# Patient Record
Sex: MALE | Race: WHITE
[De-identification: names, ages, dates, MRNs, and addresses within clinical notes are randomized per-mention and may not be internally consistent; named-entity substitution may affect disease eponyms.]

---

## 2017-05-23 ENCOUNTER — APPOINTMENT (OUTPATIENT)
Dept: ELECTROPHYSIOLOGY | Facility: CLINIC | Age: 72
End: 2017-05-23

## 2017-05-23 DIAGNOSIS — Z86.79 PERSONAL HISTORY OF OTHER DISEASES OF THE CIRCULATORY SYSTEM: ICD-10-CM

## 2017-08-22 ENCOUNTER — APPOINTMENT (OUTPATIENT)
Dept: ELECTROPHYSIOLOGY | Facility: CLINIC | Age: 72
End: 2017-08-22
Payer: MEDICARE

## 2017-08-22 PROCEDURE — 93296 REM INTERROG EVL PM/IDS: CPT

## 2017-08-22 PROCEDURE — 93294 REM INTERROG EVL PM/LDLS PM: CPT

## 2017-11-21 ENCOUNTER — APPOINTMENT (OUTPATIENT)
Dept: ELECTROPHYSIOLOGY | Facility: CLINIC | Age: 72
End: 2017-11-21
Payer: MEDICARE

## 2017-11-21 PROCEDURE — 93296 REM INTERROG EVL PM/IDS: CPT

## 2017-11-21 PROCEDURE — 93294 REM INTERROG EVL PM/LDLS PM: CPT

## 2018-02-15 ENCOUNTER — APPOINTMENT (OUTPATIENT)
Dept: ELECTROPHYSIOLOGY | Facility: CLINIC | Age: 73
End: 2018-02-15
Payer: MEDICARE

## 2018-02-15 VITALS
DIASTOLIC BLOOD PRESSURE: 79 MMHG | SYSTOLIC BLOOD PRESSURE: 136 MMHG | HEIGHT: 68 IN | BODY MASS INDEX: 36.37 KG/M2 | WEIGHT: 240 LBS | HEART RATE: 80 BPM

## 2018-02-15 PROCEDURE — 93280 PM DEVICE PROGR EVAL DUAL: CPT

## 2018-05-14 ENCOUNTER — APPOINTMENT (OUTPATIENT)
Dept: ELECTROPHYSIOLOGY | Facility: CLINIC | Age: 73
End: 2018-05-14
Payer: MEDICARE

## 2018-05-14 PROCEDURE — 93296 REM INTERROG EVL PM/IDS: CPT

## 2018-05-14 PROCEDURE — 93294 REM INTERROG EVL PM/LDLS PM: CPT

## 2018-08-29 ENCOUNTER — APPOINTMENT (OUTPATIENT)
Dept: ELECTROPHYSIOLOGY | Facility: CLINIC | Age: 73
End: 2018-08-29
Payer: MEDICARE

## 2018-08-29 PROCEDURE — 93296 REM INTERROG EVL PM/IDS: CPT

## 2018-08-29 PROCEDURE — 93294 REM INTERROG EVL PM/LDLS PM: CPT

## 2018-11-26 ENCOUNTER — APPOINTMENT (OUTPATIENT)
Dept: ELECTROPHYSIOLOGY | Facility: CLINIC | Age: 73
End: 2018-11-26
Payer: MEDICARE

## 2018-11-26 PROCEDURE — 93294 REM INTERROG EVL PM/LDLS PM: CPT

## 2018-11-26 PROCEDURE — 93296 REM INTERROG EVL PM/IDS: CPT

## 2019-02-21 ENCOUNTER — APPOINTMENT (OUTPATIENT)
Dept: ELECTROPHYSIOLOGY | Facility: CLINIC | Age: 74
End: 2019-02-21
Payer: MEDICARE

## 2019-02-21 VITALS
DIASTOLIC BLOOD PRESSURE: 70 MMHG | HEIGHT: 68 IN | BODY MASS INDEX: 39.4 KG/M2 | HEART RATE: 77 BPM | SYSTOLIC BLOOD PRESSURE: 121 MMHG | WEIGHT: 260 LBS

## 2019-02-21 PROCEDURE — 93280 PM DEVICE PROGR EVAL DUAL: CPT

## 2019-05-13 ENCOUNTER — APPOINTMENT (OUTPATIENT)
Dept: ELECTROPHYSIOLOGY | Facility: CLINIC | Age: 74
End: 2019-05-13
Payer: MEDICARE

## 2019-05-13 PROCEDURE — 93296 REM INTERROG EVL PM/IDS: CPT

## 2019-05-13 PROCEDURE — 93294 REM INTERROG EVL PM/LDLS PM: CPT

## 2019-08-19 ENCOUNTER — APPOINTMENT (OUTPATIENT)
Dept: ELECTROPHYSIOLOGY | Facility: CLINIC | Age: 74
End: 2019-08-19
Payer: MEDICARE

## 2019-08-19 PROCEDURE — 93296 REM INTERROG EVL PM/IDS: CPT

## 2019-08-19 PROCEDURE — 93294 REM INTERROG EVL PM/LDLS PM: CPT

## 2019-11-25 ENCOUNTER — APPOINTMENT (OUTPATIENT)
Dept: ELECTROPHYSIOLOGY | Facility: CLINIC | Age: 74
End: 2019-11-25
Payer: MEDICARE

## 2019-11-25 PROCEDURE — 93294 REM INTERROG EVL PM/LDLS PM: CPT

## 2019-11-25 PROCEDURE — 93296 REM INTERROG EVL PM/IDS: CPT

## 2020-02-20 ENCOUNTER — APPOINTMENT (OUTPATIENT)
Dept: ELECTROPHYSIOLOGY | Facility: CLINIC | Age: 75
End: 2020-02-20
Payer: MEDICARE

## 2020-02-20 VITALS
BODY MASS INDEX: 40.16 KG/M2 | SYSTOLIC BLOOD PRESSURE: 133 MMHG | WEIGHT: 265 LBS | RESPIRATION RATE: 20 BRPM | OXYGEN SATURATION: 99 % | DIASTOLIC BLOOD PRESSURE: 72 MMHG | HEIGHT: 68 IN | HEART RATE: 64 BPM

## 2020-02-20 PROCEDURE — 93280 PM DEVICE PROGR EVAL DUAL: CPT

## 2020-02-26 ENCOUNTER — APPOINTMENT (OUTPATIENT)
Dept: ELECTROPHYSIOLOGY | Facility: CLINIC | Age: 75
End: 2020-02-26

## 2020-05-20 ENCOUNTER — APPOINTMENT (OUTPATIENT)
Dept: ELECTROPHYSIOLOGY | Facility: CLINIC | Age: 75
End: 2020-05-20
Payer: MEDICARE

## 2020-05-20 PROCEDURE — 93294 REM INTERROG EVL PM/LDLS PM: CPT

## 2020-05-20 PROCEDURE — 93296 REM INTERROG EVL PM/IDS: CPT

## 2020-05-26 ENCOUNTER — TRANSCRIPTION ENCOUNTER (OUTPATIENT)
Age: 75
End: 2020-05-26

## 2020-08-20 ENCOUNTER — APPOINTMENT (OUTPATIENT)
Dept: ELECTROPHYSIOLOGY | Facility: CLINIC | Age: 75
End: 2020-08-20
Payer: MEDICARE

## 2020-08-20 PROCEDURE — 93296 REM INTERROG EVL PM/IDS: CPT

## 2020-08-20 PROCEDURE — 93294 REM INTERROG EVL PM/LDLS PM: CPT

## 2020-11-16 RX ORDER — CIPROFLOXACIN LACTATE 400MG/40ML
1 VIAL (ML) INTRAVENOUS
Qty: 0 | Refills: 0 | DISCHARGE
Start: 2020-11-16 | End: 2020-11-25

## 2020-11-20 ENCOUNTER — TRANSCRIPTION ENCOUNTER (OUTPATIENT)
Age: 75
End: 2020-11-20

## 2020-11-20 ENCOUNTER — APPOINTMENT (OUTPATIENT)
Dept: ELECTROPHYSIOLOGY | Facility: CLINIC | Age: 75
End: 2020-11-20
Payer: MEDICARE

## 2020-11-20 ENCOUNTER — APPOINTMENT (OUTPATIENT)
Dept: CT IMAGING | Facility: CLINIC | Age: 75
End: 2020-11-20

## 2020-11-20 PROCEDURE — 93294 REM INTERROG EVL PM/LDLS PM: CPT

## 2020-11-20 PROCEDURE — 93296 REM INTERROG EVL PM/IDS: CPT

## 2020-12-01 ENCOUNTER — INPATIENT (INPATIENT)
Facility: HOSPITAL | Age: 75
LOS: 1 days | Discharge: HOME | DRG: 640 | End: 2020-12-03
Attending: HOSPITALIST | Admitting: HOSPITALIST
Payer: MEDICARE

## 2020-12-01 VITALS — HEIGHT: 78 IN | WEIGHT: 250 LBS

## 2020-12-01 DIAGNOSIS — R42 DIZZINESS AND GIDDINESS: ICD-10-CM

## 2020-12-01 DIAGNOSIS — Z95.1 PRESENCE OF AORTOCORONARY BYPASS GRAFT: Chronic | ICD-10-CM

## 2020-12-01 LAB
ALBUMIN SERPL ELPH-MCNC: 3.6 G/DL — SIGNIFICANT CHANGE UP (ref 3.3–5)
ALP SERPL-CCNC: 59 U/L — SIGNIFICANT CHANGE UP (ref 40–120)
ALT FLD-CCNC: 21 U/L — SIGNIFICANT CHANGE UP (ref 12–78)
ANION GAP SERPL CALC-SCNC: 3 MMOL/L — LOW (ref 5–17)
APTT BLD: 46 SEC — HIGH (ref 27.5–35.5)
AST SERPL-CCNC: 21 U/L — SIGNIFICANT CHANGE UP (ref 15–37)
BASOPHILS # BLD AUTO: 0.08 K/UL — SIGNIFICANT CHANGE UP (ref 0–0.2)
BASOPHILS NFR BLD AUTO: 0.9 % — SIGNIFICANT CHANGE UP (ref 0–2)
BILIRUB SERPL-MCNC: 0.8 MG/DL — SIGNIFICANT CHANGE UP (ref 0.2–1.2)
BUN SERPL-MCNC: 20 MG/DL — SIGNIFICANT CHANGE UP (ref 7–23)
CALCIUM SERPL-MCNC: 9.4 MG/DL — SIGNIFICANT CHANGE UP (ref 8.5–10.1)
CHLORIDE SERPL-SCNC: 104 MMOL/L — SIGNIFICANT CHANGE UP (ref 96–108)
CO2 SERPL-SCNC: 32 MMOL/L — HIGH (ref 22–31)
CREAT SERPL-MCNC: 1.04 MG/DL — SIGNIFICANT CHANGE UP (ref 0.5–1.3)
EOSINOPHIL # BLD AUTO: 0.19 K/UL — SIGNIFICANT CHANGE UP (ref 0–0.5)
EOSINOPHIL NFR BLD AUTO: 2.1 % — SIGNIFICANT CHANGE UP (ref 0–6)
GLUCOSE SERPL-MCNC: 86 MG/DL — SIGNIFICANT CHANGE UP (ref 70–99)
HCT VFR BLD CALC: 43.5 % — SIGNIFICANT CHANGE UP (ref 39–50)
HGB BLD-MCNC: 13.7 G/DL — SIGNIFICANT CHANGE UP (ref 13–17)
IMM GRANULOCYTES NFR BLD AUTO: 0.3 % — SIGNIFICANT CHANGE UP (ref 0–1.5)
INR BLD: 2.54 RATIO — HIGH (ref 0.88–1.16)
LYMPHOCYTES # BLD AUTO: 1.5 K/UL — SIGNIFICANT CHANGE UP (ref 1–3.3)
LYMPHOCYTES # BLD AUTO: 16.7 % — SIGNIFICANT CHANGE UP (ref 13–44)
MAGNESIUM SERPL-MCNC: 2.5 MG/DL — SIGNIFICANT CHANGE UP (ref 1.6–2.6)
MCHC RBC-ENTMCNC: 29.1 PG — SIGNIFICANT CHANGE UP (ref 27–34)
MCHC RBC-ENTMCNC: 31.5 GM/DL — LOW (ref 32–36)
MCV RBC AUTO: 92.6 FL — SIGNIFICANT CHANGE UP (ref 80–100)
MONOCYTES # BLD AUTO: 0.9 K/UL — SIGNIFICANT CHANGE UP (ref 0–0.9)
MONOCYTES NFR BLD AUTO: 10 % — SIGNIFICANT CHANGE UP (ref 2–14)
NEUTROPHILS # BLD AUTO: 6.28 K/UL — SIGNIFICANT CHANGE UP (ref 1.8–7.4)
NEUTROPHILS NFR BLD AUTO: 70 % — SIGNIFICANT CHANGE UP (ref 43–77)
PLATELET # BLD AUTO: 257 K/UL — SIGNIFICANT CHANGE UP (ref 150–400)
POTASSIUM SERPL-MCNC: 4.3 MMOL/L — SIGNIFICANT CHANGE UP (ref 3.5–5.3)
POTASSIUM SERPL-SCNC: 4.3 MMOL/L — SIGNIFICANT CHANGE UP (ref 3.5–5.3)
PROT SERPL-MCNC: 7.3 GM/DL — SIGNIFICANT CHANGE UP (ref 6–8.3)
PROTHROM AB SERPL-ACNC: 28.4 SEC — HIGH (ref 10.6–13.6)
RBC # BLD: 4.7 M/UL — SIGNIFICANT CHANGE UP (ref 4.2–5.8)
RBC # FLD: 13.2 % — SIGNIFICANT CHANGE UP (ref 10.3–14.5)
SARS-COV-2 RNA SPEC QL NAA+PROBE: DETECTED
SODIUM SERPL-SCNC: 139 MMOL/L — SIGNIFICANT CHANGE UP (ref 135–145)
TROPONIN I SERPL-MCNC: <0.015 NG/ML — SIGNIFICANT CHANGE UP (ref 0.01–0.04)
TROPONIN I SERPL-MCNC: <0.015 NG/ML — SIGNIFICANT CHANGE UP (ref 0.01–0.04)
WBC # BLD: 8.98 K/UL — SIGNIFICANT CHANGE UP (ref 3.8–10.5)
WBC # FLD AUTO: 8.98 K/UL — SIGNIFICANT CHANGE UP (ref 3.8–10.5)

## 2020-12-01 PROCEDURE — 70450 CT HEAD/BRAIN W/O DYE: CPT | Mod: 26

## 2020-12-01 PROCEDURE — 93005 ELECTROCARDIOGRAM TRACING: CPT

## 2020-12-01 PROCEDURE — 86769 SARS-COV-2 COVID-19 ANTIBODY: CPT

## 2020-12-01 PROCEDURE — 83735 ASSAY OF MAGNESIUM: CPT

## 2020-12-01 PROCEDURE — 97161 PT EVAL LOW COMPLEX 20 MIN: CPT | Mod: GP

## 2020-12-01 PROCEDURE — 85730 THROMBOPLASTIN TIME PARTIAL: CPT

## 2020-12-01 PROCEDURE — 84484 ASSAY OF TROPONIN QUANT: CPT

## 2020-12-01 PROCEDURE — 99222 1ST HOSP IP/OBS MODERATE 55: CPT | Mod: CS

## 2020-12-01 PROCEDURE — 93306 TTE W/DOPPLER COMPLETE: CPT

## 2020-12-01 PROCEDURE — 93010 ELECTROCARDIOGRAM REPORT: CPT

## 2020-12-01 PROCEDURE — 71046 X-RAY EXAM CHEST 2 VIEWS: CPT | Mod: 26

## 2020-12-01 PROCEDURE — 36415 COLL VENOUS BLD VENIPUNCTURE: CPT

## 2020-12-01 PROCEDURE — 81003 URINALYSIS AUTO W/O SCOPE: CPT

## 2020-12-01 PROCEDURE — 84100 ASSAY OF PHOSPHORUS: CPT

## 2020-12-01 PROCEDURE — 85610 PROTHROMBIN TIME: CPT

## 2020-12-01 PROCEDURE — 85027 COMPLETE CBC AUTOMATED: CPT

## 2020-12-01 PROCEDURE — 85025 COMPLETE CBC W/AUTO DIFF WBC: CPT

## 2020-12-01 PROCEDURE — 84443 ASSAY THYROID STIM HORMONE: CPT

## 2020-12-01 PROCEDURE — 80053 COMPREHEN METABOLIC PANEL: CPT

## 2020-12-01 PROCEDURE — 80048 BASIC METABOLIC PNL TOTAL CA: CPT

## 2020-12-01 RX ORDER — ASPIRIN/CALCIUM CARB/MAGNESIUM 324 MG
81 TABLET ORAL DAILY
Refills: 0 | Status: DISCONTINUED | OUTPATIENT
Start: 2020-12-01 | End: 2020-12-03

## 2020-12-01 RX ORDER — SIMVASTATIN 20 MG/1
20 TABLET, FILM COATED ORAL AT BEDTIME
Refills: 0 | Status: DISCONTINUED | OUTPATIENT
Start: 2020-12-01 | End: 2020-12-03

## 2020-12-01 RX ORDER — ACETAMINOPHEN 500 MG
650 TABLET ORAL EVERY 4 HOURS
Refills: 0 | Status: DISCONTINUED | OUTPATIENT
Start: 2020-12-01 | End: 2020-12-03

## 2020-12-01 RX ORDER — ONDANSETRON 8 MG/1
4 TABLET, FILM COATED ORAL EVERY 6 HOURS
Refills: 0 | Status: DISCONTINUED | OUTPATIENT
Start: 2020-12-01 | End: 2020-12-03

## 2020-12-01 RX ORDER — METOPROLOL TARTRATE 50 MG
37.5 TABLET ORAL
Refills: 0 | Status: DISCONTINUED | OUTPATIENT
Start: 2020-12-01 | End: 2020-12-03

## 2020-12-01 RX ORDER — WARFARIN SODIUM 2.5 MG/1
5 TABLET ORAL DAILY
Refills: 0 | Status: DISCONTINUED | OUTPATIENT
Start: 2020-12-01 | End: 2020-12-03

## 2020-12-01 RX ADMIN — SIMVASTATIN 20 MILLIGRAM(S): 20 TABLET, FILM COATED ORAL at 22:48

## 2020-12-01 RX ADMIN — Medication 37.5 MILLIGRAM(S): at 22:47

## 2020-12-01 RX ADMIN — WARFARIN SODIUM 5 MILLIGRAM(S): 2.5 TABLET ORAL at 22:47

## 2020-12-01 NOTE — H&P ADULT - NSHPPHYSICALEXAM_GEN_ALL_CORE
Vital Signs Last 24 Hrs  T(C): 36.1 (01 Dec 2020 21:06), Max: 37.1 (01 Dec 2020 12:25)  T(F): 97 (01 Dec 2020 21:06), Max: 98.7 (01 Dec 2020 12:25)  HR: 81 (01 Dec 2020 21:06) (75 - 81)  BP: 166/87 (01 Dec 2020 21:06) (163/81 - 175/88)  BP(mean): 102 (01 Dec 2020 12:25) (102 - 102)  RR: 20 (01 Dec 2020 21:06) (18 - 20)  SpO2: 100% (01 Dec 2020 21:06) (96% - 100%)

## 2020-12-01 NOTE — ED PROVIDER NOTE - PROGRESS NOTE DETAILS
Spoke with Dr. Laguna's service , they agree with tele admission for further evaluation. Per EP NP, PAF but no other arrythmia.

## 2020-12-01 NOTE — ED PROVIDER NOTE - PHYSICAL EXAMINATION
Vital signs as available reviewed.  General:  Comfortable, no acute distress. Overweight.  Head:  Normocephalic, atraumatic.  Eyes:  Conjunctiva pink, no icterus.  Cardiovascular:  Regular rate, no obvious murmur.  Respiratory:  Clear to auscultation, good air entry bilaterally.  Abdomen:  Soft, non-tender.  Musculoskeletal:  No deformity or calf tenderness.  Neurologic: Alert and oriented, moving all extremities.  Skin:  Warm and dry.

## 2020-12-01 NOTE — ED STATDOCS - PROGRESS NOTE DETAILS
Lilly PICHARDO for ED attending, Dr. Gupta: 74 y/o male with a PMHx of HTN, HLD, Afib on Coumadin, mitral regurgitation, CAD s/p CABG x3, s/p PPM placement, presents to the ED c/o intermittent dizziness x1 week. Pt reports episode of dizziness and having to catch himself from falling today. Denies recent falls, trauma. Denies room-spinning sensation. Denies headache, nausea, vomiting, diarrhea. Pt reports being on antibiotics Rx'd by PCP for abdominal discomfort 3-4 weeks ago and elevated INR secondary to abx and Coumadin use and recent bleeding. Notes INR recently 2.7. States has had increased labored breathing and gas recently. On baby ASA. PCP: Dr. Cee. Cardio: Dr. Laguna. Will send pt to main ED for further evaluation.

## 2020-12-01 NOTE — H&P ADULT - NSICDXPASTMEDICALHX_GEN_ALL_CORE_FT
PAST MEDICAL HISTORY:  Atrial fibrillation     CAD (coronary artery disease)     HLD (hyperlipidemia)     Hypertension, unspecified type     Mitral regurgitation

## 2020-12-01 NOTE — ED PROVIDER NOTE - NS ED ROS FT
Constitutional: No fever.  Neurological: No headache. + dizziness  Eyes: No vision changes.   Ears, Nose, Mouth, Throat: No congestion.  Cardiovascular: No chest pain.  Respiratory: + difficulty breathing.  Gastrointestinal: No nausea or vomiting.  Genitourinary: No dysuria.  Musculoskeletal: No joint pain.  Integumentary (skin and/or breast): No rash.

## 2020-12-01 NOTE — H&P ADULT - HISTORY OF PRESENT ILLNESS
75 year old male patient with pertinent history of CAD presented to the ED complaining of dizziness associated with near syncope. Patient states he has been dizzy for the entire day and had to lean against a wall in his house twice or he would pass out. Denies any associated palpitations, chest pain or visual changes. States he has been having progressively worsening shortness of breath over a 1 year period. Denies any fever, chills, sick contacts, falls, nausea or vomiting.       In the ED patient had PPM interrogated. NO EKG changes or telemetry events noted. There was an incidental discovery of positive COVID.

## 2020-12-01 NOTE — ED PROVIDER NOTE - PMH
Atrial fibrillation    CAD (coronary artery disease)    HLD (hyperlipidemia)    Hypertension, unspecified type    Mitral regurgitation

## 2020-12-01 NOTE — ED PROVIDER NOTE - OBJECTIVE STATEMENT
6 y/o male with a PMHx of HTN, HLD, Afib on Coumadin, mitral regurgitation, CAD s/p CABG x3, s/p PPM placement, presents to the ED c/o intermittent dizziness. Worse with exertion, had episode today where he was walking then felt like he was going to pass out, rested for a while but then when he went to stand he felt dizzy again. Also report mild dyspnea. No chest pain or pressure. Feels well otherwise, no fevers, chills, nausea, vomiting. Had hemturia around thanksgiving due to elevated INR due to antibiotic / warfarin interaction.

## 2020-12-01 NOTE — ED ADULT NURSE NOTE - NSIMPLEMENTINTERV_GEN_ALL_ED
Implemented All Universal Safety Interventions:  [place] to call system. Call bell, personal items and telephone within reach. Instruct patient to call for assistance. Room bathroom lighting operational. Non-slip footwear when patient is off stretcher. Physically safe environment: no spills, clutter or unnecessary equipment. Stretcher in lowest position, wheels locked, appropriate side rails in place.

## 2020-12-01 NOTE — ED ADULT NURSE NOTE - OBJECTIVE STATEMENT
Pt comes to the ED complaining of dizziness. Pt states that he was walking this am and that he was unable to do his normal walk because of how dizzy he was feeling. Pt states that he went home and continued to feel dizzy and was unable to ambulate without holding onto the wall to keep him upright. Pt states that he has been feeling light headed for the past few days. Pt denies any CP, SOB.

## 2020-12-01 NOTE — ED ADULT TRIAGE NOTE - CHIEF COMPLAINT QUOTE
pt c/o dizziness for 6 DAYS with lightheadedness. pt denies confusion cognitive or motor imapirment, pt denies weakness, confusion or slurred speech. pt states he ahs no hx of DVA or vertigo. pt has hx of PPM triple bypasss. and has had increased labored breathing and gas recently. no new onset of symptoms in the apst 24 hours. befast negative

## 2020-12-01 NOTE — H&P ADULT - ASSESSMENT
75 year old male patient with near syncope with incidental COVID       -Admit to tele      #Presyncope  -monitor on tele  -PPM interrogated in the ED  -get morning echo  -check orthostatics    #Hx of Afib/CAD  -on coumadin  -on metoprolol for rate control  -monitor INR    #HTN  -on metoprolol    #HLD   -on statin     #Advanced directives  -full code    #DVT ppx  -on  coumadin 75 year old male patient with near syncope with incidental COVID       -Admit to tele      #Presyncope  -monitor on tele  -PPM interrogated in the ED  -get morning echo  -check orthostatics    #Hx of Afib/CAD  -on coumadin  -on metoprolol for rate control  -monitor INR    #HTN  -on metoprolol    #HLD   -on statin     #Covid-19  -currently asymptomatic  -supportive care  -isolate pt    #Advanced directives  -full code    #DVT ppx  -on  coumadin

## 2020-12-01 NOTE — ED ADULT NURSE NOTE - CHPI ED NUR SYMPTOMS NEG
no congestion/no diaphoresis/no nausea/no shortness of breath/no vomiting/no syncope/no back pain/no chills/no fever/no chest pain

## 2020-12-02 ENCOUNTER — TRANSCRIPTION ENCOUNTER (OUTPATIENT)
Age: 75
End: 2020-12-02

## 2020-12-02 LAB
ADD ON TEST-SPECIMEN IN LAB: SIGNIFICANT CHANGE UP
ALBUMIN SERPL ELPH-MCNC: 3.5 G/DL — SIGNIFICANT CHANGE UP (ref 3.3–5)
ALP SERPL-CCNC: 58 U/L — SIGNIFICANT CHANGE UP (ref 40–120)
ALT FLD-CCNC: 22 U/L — SIGNIFICANT CHANGE UP (ref 12–78)
ANION GAP SERPL CALC-SCNC: 2 MMOL/L — LOW (ref 5–17)
APPEARANCE UR: CLEAR — SIGNIFICANT CHANGE UP
APTT BLD: 49.5 SEC — HIGH (ref 27.5–35.5)
AST SERPL-CCNC: 22 U/L — SIGNIFICANT CHANGE UP (ref 15–37)
BASOPHILS # BLD AUTO: 0.08 K/UL — SIGNIFICANT CHANGE UP (ref 0–0.2)
BASOPHILS NFR BLD AUTO: 1 % — SIGNIFICANT CHANGE UP (ref 0–2)
BILIRUB SERPL-MCNC: 1 MG/DL — SIGNIFICANT CHANGE UP (ref 0.2–1.2)
BILIRUB UR-MCNC: NEGATIVE — SIGNIFICANT CHANGE UP
BUN SERPL-MCNC: 19 MG/DL — SIGNIFICANT CHANGE UP (ref 7–23)
CALCIUM SERPL-MCNC: 9.3 MG/DL — SIGNIFICANT CHANGE UP (ref 8.5–10.1)
CHLORIDE SERPL-SCNC: 107 MMOL/L — SIGNIFICANT CHANGE UP (ref 96–108)
CO2 SERPL-SCNC: 32 MMOL/L — HIGH (ref 22–31)
COLOR SPEC: YELLOW — SIGNIFICANT CHANGE UP
CREAT SERPL-MCNC: 1.05 MG/DL — SIGNIFICANT CHANGE UP (ref 0.5–1.3)
DIFF PNL FLD: NEGATIVE — SIGNIFICANT CHANGE UP
EOSINOPHIL # BLD AUTO: 0.16 K/UL — SIGNIFICANT CHANGE UP (ref 0–0.5)
EOSINOPHIL NFR BLD AUTO: 2.1 % — SIGNIFICANT CHANGE UP (ref 0–6)
GLUCOSE SERPL-MCNC: 84 MG/DL — SIGNIFICANT CHANGE UP (ref 70–99)
GLUCOSE UR QL: NEGATIVE MG/DL — SIGNIFICANT CHANGE UP
HCT VFR BLD CALC: 43.7 % — SIGNIFICANT CHANGE UP (ref 39–50)
HGB BLD-MCNC: 13.8 G/DL — SIGNIFICANT CHANGE UP (ref 13–17)
IMM GRANULOCYTES NFR BLD AUTO: 0.3 % — SIGNIFICANT CHANGE UP (ref 0–1.5)
INR BLD: 2.72 RATIO — HIGH (ref 0.88–1.16)
KETONES UR-MCNC: NEGATIVE — SIGNIFICANT CHANGE UP
LEUKOCYTE ESTERASE UR-ACNC: NEGATIVE — SIGNIFICANT CHANGE UP
LYMPHOCYTES # BLD AUTO: 1.15 K/UL — SIGNIFICANT CHANGE UP (ref 1–3.3)
LYMPHOCYTES # BLD AUTO: 14.8 % — SIGNIFICANT CHANGE UP (ref 13–44)
MAGNESIUM SERPL-MCNC: 2.5 MG/DL — SIGNIFICANT CHANGE UP (ref 1.6–2.6)
MCHC RBC-ENTMCNC: 29.1 PG — SIGNIFICANT CHANGE UP (ref 27–34)
MCHC RBC-ENTMCNC: 31.6 GM/DL — LOW (ref 32–36)
MCV RBC AUTO: 92.2 FL — SIGNIFICANT CHANGE UP (ref 80–100)
MONOCYTES # BLD AUTO: 0.85 K/UL — SIGNIFICANT CHANGE UP (ref 0–0.9)
MONOCYTES NFR BLD AUTO: 11 % — SIGNIFICANT CHANGE UP (ref 2–14)
NEUTROPHILS # BLD AUTO: 5.5 K/UL — SIGNIFICANT CHANGE UP (ref 1.8–7.4)
NEUTROPHILS NFR BLD AUTO: 70.8 % — SIGNIFICANT CHANGE UP (ref 43–77)
NITRITE UR-MCNC: NEGATIVE — SIGNIFICANT CHANGE UP
PH UR: 7 — SIGNIFICANT CHANGE UP (ref 5–8)
PHOSPHATE SERPL-MCNC: 2.6 MG/DL — SIGNIFICANT CHANGE UP (ref 2.5–4.5)
PLATELET # BLD AUTO: 248 K/UL — SIGNIFICANT CHANGE UP (ref 150–400)
POTASSIUM SERPL-MCNC: 4.4 MMOL/L — SIGNIFICANT CHANGE UP (ref 3.5–5.3)
POTASSIUM SERPL-SCNC: 4.4 MMOL/L — SIGNIFICANT CHANGE UP (ref 3.5–5.3)
PROT SERPL-MCNC: 7.1 GM/DL — SIGNIFICANT CHANGE UP (ref 6–8.3)
PROT UR-MCNC: NEGATIVE MG/DL — SIGNIFICANT CHANGE UP
PROTHROM AB SERPL-ACNC: 30.3 SEC — HIGH (ref 10.6–13.6)
RBC # BLD: 4.74 M/UL — SIGNIFICANT CHANGE UP (ref 4.2–5.8)
RBC # FLD: 13.3 % — SIGNIFICANT CHANGE UP (ref 10.3–14.5)
SARS-COV-2 IGG SERPL QL IA: POSITIVE
SARS-COV-2 IGM SERPL IA-ACNC: 6.91 INDEX — HIGH
SODIUM SERPL-SCNC: 141 MMOL/L — SIGNIFICANT CHANGE UP (ref 135–145)
SP GR SPEC: 1.01 — SIGNIFICANT CHANGE UP (ref 1.01–1.02)
TROPONIN I SERPL-MCNC: <0.015 NG/ML — SIGNIFICANT CHANGE UP (ref 0.01–0.04)
TSH SERPL-MCNC: 1.18 UU/ML — SIGNIFICANT CHANGE UP (ref 0.34–4.82)
UROBILINOGEN FLD QL: 4 MG/DL
WBC # BLD: 7.76 K/UL — SIGNIFICANT CHANGE UP (ref 3.8–10.5)
WBC # FLD AUTO: 7.76 K/UL — SIGNIFICANT CHANGE UP (ref 3.8–10.5)

## 2020-12-02 PROCEDURE — 93306 TTE W/DOPPLER COMPLETE: CPT | Mod: 26

## 2020-12-02 PROCEDURE — 93010 ELECTROCARDIOGRAM REPORT: CPT

## 2020-12-02 RX ORDER — SODIUM CHLORIDE 9 MG/ML
1000 INJECTION INTRAMUSCULAR; INTRAVENOUS; SUBCUTANEOUS
Refills: 0 | Status: DISCONTINUED | OUTPATIENT
Start: 2020-12-02 | End: 2020-12-03

## 2020-12-02 RX ORDER — ALBUTEROL 90 UG/1
2 AEROSOL, METERED ORAL EVERY 6 HOURS
Refills: 0 | Status: DISCONTINUED | OUTPATIENT
Start: 2020-12-02 | End: 2020-12-03

## 2020-12-02 RX ADMIN — Medication 81 MILLIGRAM(S): at 09:22

## 2020-12-02 RX ADMIN — Medication 37.5 MILLIGRAM(S): at 21:34

## 2020-12-02 RX ADMIN — SIMVASTATIN 20 MILLIGRAM(S): 20 TABLET, FILM COATED ORAL at 21:35

## 2020-12-02 RX ADMIN — SODIUM CHLORIDE 60 MILLILITER(S): 9 INJECTION INTRAMUSCULAR; INTRAVENOUS; SUBCUTANEOUS at 11:38

## 2020-12-02 RX ADMIN — Medication 37.5 MILLIGRAM(S): at 09:22

## 2020-12-02 RX ADMIN — WARFARIN SODIUM 5 MILLIGRAM(S): 2.5 TABLET ORAL at 21:35

## 2020-12-02 NOTE — PROGRESS NOTE ADULT - SUBJECTIVE AND OBJECTIVE BOX
CHIEF COMPLAINT/DIAGNOSIS: dizziness, near syncope    HPI: 75 year old male patient with pertinent history of CAD presented to the ED complaining of dizziness associated with near syncope. Patient states he has been dizzy for the entire day and had to lean against a wall in his house twice or he would pass out. Denies any associated palpitations, chest pain or visual changes. States he has been having progressively worsening shortness of breath over a 1 year period. Denies any fever, chills, sick contacts, falls, nausea or vomiting. In the ED patient had PPM interrogated. NO EKG changes or telemetry events noted. There was an incidental discovery of positive COVID.     12/2: feeling well. no complaints. denies sob, dizziness, or CP  REVIEW OF SYSTEMS:  All other review of systems is negative unless indicated above    PHYSICAL EXAM:  Constitutional: Awake and alert, well-developed  HEENT: PERR, EOMI, Normal Hearing, MMM  Neck: Soft and supple, No LAD, No JVD  Respiratory: Breath sounds are diminished b/l  Cardiovascular: S1 and S2, regular rate and rhythm, no Murmurs, gallops or rubs  Gastrointestinal: Bowel Sounds present, soft, nontender, nondistended, no guarding, no rebound  Extremities: ble peripheral edema, pitting +1/2 edema   Vascular: 2+ peripheral pulses  Neurological: A/O x 3, no focal deficits  Musculoskeletal: 5/5 strength b/l upper and lower extremities  Skin: No rashes    Vital Signs Last 24 Hrs  T(C): 36.7 (02 Dec 2020 08:51), Max: 36.7 (02 Dec 2020 08:51)  T(F): 98.1 (02 Dec 2020 08:51), Max: 98.1 (02 Dec 2020 08:51)  HR: 64 (01 Dec 2020 22:18) (64 - 81)  BP: 142/62 (01 Dec 2020 22:18) (142/62 - 175/88)  BP(mean): --  RR: 18 (02 Dec 2020 08:51) (18 - 20)  SpO2: 96% (02 Dec 2020 08:51) (96% - 100%) -- room air    LABS: All Labs Reviewed:                        13.8   7.76  )-----------( 248      ( 02 Dec 2020 07:59 )             43.7     12-02    141  |  107  |  19  ----------------------------<  84  4.4   |  32<H>  |  1.05    Ca    9.3      02 Dec 2020 07:59  Phos  2.6     12-02  Mg     2.5     12-02    TPro  7.1  /  Alb  3.5  /  TBili  1.0  /  DBili  x   /  AST  22  /  ALT  22  /  AlkPhos  58  12-02    PT/INR - ( 02 Dec 2020 07:59 )   PT: 30.3 sec;   INR: 2.72 ratio         PTT - ( 02 Dec 2020 07:59 )  PTT:49.5 sec  CARDIAC MARKERS ( 02 Dec 2020 07:59 )  <0.015 ng/mL / x     / x     / x     / x      CARDIAC MARKERS ( 01 Dec 2020 23:15 )  <0.015 ng/mL / x     / x     / x     / x      CARDIAC MARKERS ( 01 Dec 2020 12:53 )  <0.015 ng/mL / x     / x     / x     / x        RADIOLOGY:    < from: Xray Chest 2 Views PA/Lat (12.01.20 @ 13:37) >  IMPRESSION:  Cardiomegaly. Status post cardiac valve replacement.  No acute radiographic cardiopulmonary pathology.  < end of copied text >    < from: CT Head No Cont (12.01.20 @ 13:30) >  IMPRESSION: Mild periventricular white matter ischemia.  Minimal mucosal thickening in the BILATERAL ethmoid sinuses.  < end of copied text >    ECHOCARDIOGRAM: pending    MEDICATIONS  (STANDING):  aspirin enteric coated 81 milliGRAM(s) Oral daily  metoprolol tartrate Oral Tab/Cap - Peds 37.5 milliGRAM(s) Oral two times a day  simvastatin 20 milliGRAM(s) Oral at bedtime  sodium chloride 0.9%. 1000 milliLiter(s) (60 mL/Hr) IV Continuous <Continuous>  warfarin 5 milliGRAM(s) Oral daily    MEDICATIONS  (PRN):  acetaminophen   Tablet .. 650 milliGRAM(s) Oral every 4 hours PRN Mild Pain (1 - 3)  ALBUTerol    90 MICROgram(s) HFA Inhaler 2 Puff(s) Inhalation every 6 hours PRN Shortness of Breath and/or Wheezing  ondansetron Injectable 4 milliGRAM(s) IV Push every 6 hours PRN Nausea    TELEMETRY REVIEW:  12/2: A-Paced. 02 100%RA    ASSESSMENT AND PLAN:    1) Presyncope likely due to dehydration  - monitor on tele. Paced  - PPM interrogated - low afib burden <1%  - Echo pending  - check orthostatics > positive. cont. gentle hydration  - infectious w/u negative: UA, CXR neg.  - tsh wnl  - cardio consult     2) PAfib | PPM   - Cont. Coumadin / BB  - monitor INR  - PPM interrogated     3) HTN | HTN  - Cont. metoprolol, statin     4) COVID -19 PNA  - currently asymptomatic  - supportive care, maintain isolation     5) DVT ppx  - Cont. Coumadin    Dispo: cont. hydration, repeat orthostatics in Am. f/u echo.

## 2020-12-02 NOTE — PHYSICAL THERAPY INITIAL EVALUATION ADULT - ACTIVE RANGE OF MOTION EXAMINATION, REHAB EVAL
Quality 111:Pneumonia Vaccination Status For Older Adults: Pneumococcal Vaccination Previously Received
Quality 110: Preventive Care And Screening: Influenza Immunization: Influenza Immunization previously received during influenza season
Detail Level: Detailed
Quality 130: Documentation Of Current Medications In The Medical Record: Current Medications Documented
bilateral upper extremity Active ROM was WFL (within functional limits)/bilateral  lower extremity Active ROM was WFL (within functional limits)

## 2020-12-02 NOTE — DISCHARGE NOTE NURSING/CASE MANAGEMENT/SOCIAL WORK - NSDCPEPTCAREGIVEDUMATLIST _GEN_ALL_CORE
Coronavirus/COVID19/Influenza Vaccination Warfarin/Coumadin/Influenza Vaccination/Coronavirus/COVID19 yes

## 2020-12-02 NOTE — DISCHARGE NOTE NURSING/CASE MANAGEMENT/SOCIAL WORK - PATIENT PORTAL LINK FT
You can access the FollowMyHealth Patient Portal offered by [facility] by registering at the following website: [email or web address]. By joining [employer]’s FollowMyHealth portal, you will also be able to view your health information using other applications (apps) compatible with our system.

## 2020-12-03 ENCOUNTER — TRANSCRIPTION ENCOUNTER (OUTPATIENT)
Age: 75
End: 2020-12-03

## 2020-12-03 VITALS
RESPIRATION RATE: 18 BRPM | SYSTOLIC BLOOD PRESSURE: 133 MMHG | HEART RATE: 99 BPM | DIASTOLIC BLOOD PRESSURE: 69 MMHG | TEMPERATURE: 99 F | OXYGEN SATURATION: 94 %

## 2020-12-03 LAB
ANION GAP SERPL CALC-SCNC: 1 MMOL/L — LOW (ref 5–17)
BUN SERPL-MCNC: 15 MG/DL — SIGNIFICANT CHANGE UP (ref 7–23)
CALCIUM SERPL-MCNC: 8.9 MG/DL — SIGNIFICANT CHANGE UP (ref 8.5–10.1)
CHLORIDE SERPL-SCNC: 108 MMOL/L — SIGNIFICANT CHANGE UP (ref 96–108)
CO2 SERPL-SCNC: 32 MMOL/L — HIGH (ref 22–31)
CREAT SERPL-MCNC: 0.95 MG/DL — SIGNIFICANT CHANGE UP (ref 0.5–1.3)
GLUCOSE SERPL-MCNC: 82 MG/DL — SIGNIFICANT CHANGE UP (ref 70–99)
HCT VFR BLD CALC: 44.7 % — SIGNIFICANT CHANGE UP (ref 39–50)
HGB BLD-MCNC: 14.1 G/DL — SIGNIFICANT CHANGE UP (ref 13–17)
INR BLD: 2.75 RATIO — HIGH (ref 0.88–1.16)
MCHC RBC-ENTMCNC: 29 PG — SIGNIFICANT CHANGE UP (ref 27–34)
MCHC RBC-ENTMCNC: 31.5 GM/DL — LOW (ref 32–36)
MCV RBC AUTO: 91.8 FL — SIGNIFICANT CHANGE UP (ref 80–100)
PLATELET # BLD AUTO: 243 K/UL — SIGNIFICANT CHANGE UP (ref 150–400)
POTASSIUM SERPL-MCNC: 4.6 MMOL/L — SIGNIFICANT CHANGE UP (ref 3.5–5.3)
POTASSIUM SERPL-SCNC: 4.6 MMOL/L — SIGNIFICANT CHANGE UP (ref 3.5–5.3)
PROTHROM AB SERPL-ACNC: 30.4 SEC — HIGH (ref 10.6–13.6)
RBC # BLD: 4.87 M/UL — SIGNIFICANT CHANGE UP (ref 4.2–5.8)
RBC # FLD: 13.4 % — SIGNIFICANT CHANGE UP (ref 10.3–14.5)
SODIUM SERPL-SCNC: 141 MMOL/L — SIGNIFICANT CHANGE UP (ref 135–145)
WBC # BLD: 8.24 K/UL — SIGNIFICANT CHANGE UP (ref 3.8–10.5)
WBC # FLD AUTO: 8.24 K/UL — SIGNIFICANT CHANGE UP (ref 3.8–10.5)

## 2020-12-03 PROCEDURE — 99239 HOSP IP/OBS DSCHRG MGMT >30: CPT | Mod: CS

## 2020-12-03 RX ORDER — WARFARIN SODIUM 2.5 MG/1
1 TABLET ORAL
Qty: 0 | Refills: 0 | DISCHARGE

## 2020-12-03 RX ORDER — ALBUTEROL 90 UG/1
2 AEROSOL, METERED ORAL
Qty: 1 | Refills: 0
Start: 2020-12-03

## 2020-12-03 RX ADMIN — Medication 37.5 MILLIGRAM(S): at 08:59

## 2020-12-03 RX ADMIN — Medication 81 MILLIGRAM(S): at 08:59

## 2020-12-03 NOTE — DISCHARGE NOTE PROVIDER - NSDCCPCAREPLAN_GEN_ALL_CORE_FT
PRINCIPAL DISCHARGE DIAGNOSIS  Diagnosis: Dizziness  Assessment and Plan of Treatment: - You were admitted due to dizziness and near syncope from dehydration (likely due to COVID19 virus) causing orthostatic hypotension. Orthostatic hypotension is a drop in blood pressure that occurs when moving from a laying down (supine) position to a standing (upright) position. The word "orthostasis" means to stand up, so the condition is defined as low blood pressure (hypotension) that occurs upon standing.  - You recieved IV fluid hydration during your hospital stay. Continue to stay hydrated at home!  - Follow up with your cardiologist Dr. Laguna in 1 to 2 weeks after discharge for further management - Call to make an appointment  ** Monitor for the following signs/symptoms including fatigue, confusion, dizziness, blurred vision, or fainting episodes (syncope). If you experience these signs/symptoms please alert your primary care provider. If your signs/symptoms are severe return to the ED **      SECONDARY DISCHARGE DIAGNOSES  Diagnosis: COVID-19  Assessment and Plan of Treatment: - You tested positive for COVID19 virus. COVID-19 is the disease caused by the novel (new) coronavirus.  Continue to follow the guidlines provided in your COVID19 packet.  - The following are general guidelines to remind you how to keep others safe until you are well: (1) Wash your hands often, (2) Do not go out of your home unless it is necessary, (3) Do not have close physical contact with anyone unless it is necessary - Family members and friends should not visit you (4) Wear a face covering while others are near you (5) Do not share items.   - The following are general guidelines for when you can be around others: (1) If you never developed any symptoms, wait at least 10 days after your positive test. Your provider may want you to have 2 negative tests in a row at least 24 hours apart. This depends on how available testing is in your area. (2) If you did have symptoms, wait at least 10 days after the symptoms first appeared. Then you will need to have no fever for 24 hours without fever medicine. Most of your symptoms will also need to be gone. A loss of taste or smell may continue for several months. It is considered okay to be around others if this is your only symptom.     PRINCIPAL DISCHARGE DIAGNOSIS  Diagnosis: Dizziness  Assessment and Plan of Treatment: - You were admitted due to dizziness and near syncope from dehydration (likely due to COVID19 virus) causing orthostatic hypotension. Orthostatic hypotension is a drop in blood pressure that occurs when moving from a laying down (supine) position to a standing (upright) position. The word "orthostasis" means to stand up, so the condition is defined as low blood pressure (hypotension) that occurs upon standing.  - You recieved IV fluid hydration during your hospital stay. Continue to stay hydrated at home!  - Follow up with your cardiologist Dr. Laguna in 1 to 2 weeks after discharge for further management - Call to make an appointment  ** Monitor for the following signs/symptoms including fatigue, confusion, dizziness, blurred vision, or fainting episodes (syncope). If you experience these signs/symptoms please alert your primary care provider. If your signs/symptoms are severe return to the ED **      SECONDARY DISCHARGE DIAGNOSES  Diagnosis: Afib  Assessment and Plan of Treatment: - You INR is 2.75. Continue your coumadin regimen at home.    Diagnosis: COVID-19  Assessment and Plan of Treatment: - You tested positive for COVID19 virus. COVID-19 is the disease caused by the novel (new) coronavirus.  Continue to follow the guidlines provided in your COVID19 packet.  - The following are general guidelines to remind you how to keep others safe until you are well: (1) Wash your hands often, (2) Do not go out of your home unless it is necessary, (3) Do not have close physical contact with anyone unless it is necessary - Family members and friends should not visit you (4) Wear a face covering while others are near you (5) Do not share items.   - The following are general guidelines for when you can be around others: (1) If you never developed any symptoms, wait at least 10 days after your positive test. Your provider may want you to have 2 negative tests in a row at least 24 hours apart. This depends on how available testing is in your area. (2) If you did have symptoms, wait at least 10 days after the symptoms first appeared. Then you will need to have no fever for 24 hours without fever medicine. Most of your symptoms will also need to be gone. A loss of taste or smell may continue for several months. It is considered okay to be around others if this is your only symptom.

## 2020-12-03 NOTE — DISCHARGE NOTE PROVIDER - CARE PROVIDER_API CALL
Marvel Cee)  Hematology; Internal Medicine  [address]  [place] [address]  Phone: [phone]  Fax: [phone]  Follow Up Time:     Minna Laguna)  Cardiology; Interventional Cardiology  [address] [place] [address], [place] [address]  Phone: [phone]  Fax: [phone]  Follow Up Time:

## 2020-12-03 NOTE — DISCHARGE NOTE PROVIDER - NSDCFUADDINST_GEN_ALL_CORE_FT
PCP- follow up in 1 week. Bring these papers with you to that appointment so your PCP can request records from your hospital stay.    **If you have questions about your discharge instructions please contact [address] at [phone]**

## 2020-12-03 NOTE — DISCHARGE NOTE PROVIDER - INSTRUCTIONS
Low Sodium. Low Cholesterol  Choose foods that are low in salt - examples include: fresh meats, poultry, fish, eggs, milk and yogurt. Avoid packaged/processed foods and excessive table salt use.   - Stay hydrated!!

## 2020-12-03 NOTE — DISCHARGE NOTE PROVIDER - NSDCMRMEDTOKEN_GEN_ALL_CORE_FT
albuterol 90 mcg/inh inhalation aerosol: 2 puff(s) inhaled every 6 hours, As needed, Shortness of Breath and/or Wheezing  Aspirin Enteric Coated 81 mg oral delayed release tablet: 1 tab(s) orally once a day  metoprolol tartrate 25 mg oral tablet: 1.5 tab(s) orally 2 times a day  simvastatin 20 mg oral tablet: 1 tab(s) orally once a day (at bedtime)  warfarin 5 mg oral tablet: 1 tab(s) orally once a day

## 2020-12-03 NOTE — CONSULT NOTE ADULT - SUBJECTIVE AND OBJECTIVE BOX
Patient is a 75y old  Male who presents with a chief complaint of Near Syncope (03 Dec 2020 07:54)      HPI:  75 year old male patient with pertinent history of CAD presented to the ED complaining of dizziness associated with near syncope. Patient states he has been dizzy for the entire day and had to lean against a wall in his house twice or he would pass out. Denies any associated palpitations, chest pain or visual changes. States he has been having progressively worsening shortness of breath over a 1 year period. Denies any fever, chills, sick contacts, falls, nausea or vomiting.       In the ED patient had PPM interrogated. NO EKG changes or telemetry events noted. There was an incidental discovery of positive COVID.  (01 Dec 2020 21:11)      PAST MEDICAL & SURGICAL HISTORY:  CAD (coronary artery disease)    HLD (hyperlipidemia)    Hypertension, unspecified type    Mitral regurgitation    Atrial fibrillation    S/P CABG x 3    S/P rotator cuff surgery        PREVIOUS CARDIAC WORKUP:      Echo:  Stress Test:  Cardiac Cath:    ALLERGIES:    No Known Allergies       MEDICATIONS  (STANDING):  aspirin enteric coated 81 milliGRAM(s) Oral daily  metoprolol tartrate Oral Tab/Cap - Peds 37.5 milliGRAM(s) Oral two times a day  simvastatin 20 milliGRAM(s) Oral at bedtime  sodium chloride 0.9%. 1000 milliLiter(s) (60 mL/Hr) IV Continuous <Continuous>  warfarin 5 milliGRAM(s) Oral daily    MEDICATIONS  (PRN):  acetaminophen   Tablet .. 650 milliGRAM(s) Oral every 4 hours PRN Mild Pain (1 - 3)  ALBUTerol    90 MICROgram(s) HFA Inhaler 2 Puff(s) Inhalation every 6 hours PRN Shortness of Breath and/or Wheezing  ondansetron Injectable 4 milliGRAM(s) IV Push every 6 hours PRN Nausea      FAMILY HISTORY:        SOCIAL HISTORY:  .scl     ROS:     .ros    Vital Signs Last 24 Hrs  T(C): 37 (03 Dec 2020 08:46), Max: 37 (03 Dec 2020 08:46)  T(F): 98.6 (03 Dec 2020 08:46), Max: 98.6 (03 Dec 2020 08:46)  HR: 99 (03 Dec 2020 08:46) (69 - 99)  BP: 133/69 (03 Dec 2020 08:46) (96/50 - 133/69)  BP(mean): --  RR: 18 (03 Dec 2020 08:46) (18 - 18)  SpO2: 94% (03 Dec 2020 08:46) (94% - 100%)    I&O's Summary      PHYSICAL EXAM:    .phy      TELEMETRY:    ECG:    LABS:                          14.1   8.24  )-----------( 243      ( 03 Dec 2020 07:53 )             44.7     12-03    141  |  108  |  15  ----------------------------<  82  4.6   |  32<H>  |  0.95    Ca    8.9      03 Dec 2020 07:53  Phos  2.6     12-02  Mg     2.5     12-02    TPro  7.1  /  Alb  3.5  /  TBili  1.0  /  DBili  x   /  AST  22  /  ALT  22  /  AlkPhos  58  12-02        12-02 @ 07:59  Trop-I  <0.015  CK      --  CK-MB   --    12-01 @ 23:15  Trop-I  <0.015  CK      --  CK-MB   --    12-01 @ 12:53  Trop-I  <0.015  CK      --  CK-MB   --      PT/INR - ( 03 Dec 2020 07:53 )   PT: 30.4 sec;   INR: 2.75 ratio         PTT - ( 02 Dec 2020 07:59 )  PTT:49.5 sec          RADIOLOGY & ADDITIONAL STUDIES:

## 2020-12-11 DIAGNOSIS — I10 ESSENTIAL (PRIMARY) HYPERTENSION: ICD-10-CM

## 2020-12-11 DIAGNOSIS — I48.0 PAROXYSMAL ATRIAL FIBRILLATION: ICD-10-CM

## 2020-12-11 DIAGNOSIS — U07.1 COVID-19: ICD-10-CM

## 2020-12-11 DIAGNOSIS — E86.0 DEHYDRATION: ICD-10-CM

## 2020-12-11 DIAGNOSIS — E78.5 HYPERLIPIDEMIA, UNSPECIFIED: ICD-10-CM

## 2020-12-11 DIAGNOSIS — I25.10 ATHEROSCLEROTIC HEART DISEASE OF NATIVE CORONARY ARTERY WITHOUT ANGINA PECTORIS: ICD-10-CM

## 2020-12-11 DIAGNOSIS — I34.0 NONRHEUMATIC MITRAL (VALVE) INSUFFICIENCY: ICD-10-CM

## 2020-12-11 DIAGNOSIS — I95.1 ORTHOSTATIC HYPOTENSION: ICD-10-CM

## 2020-12-11 DIAGNOSIS — Z95.0 PRESENCE OF CARDIAC PACEMAKER: ICD-10-CM

## 2021-02-23 ENCOUNTER — APPOINTMENT (OUTPATIENT)
Dept: ELECTROPHYSIOLOGY | Facility: CLINIC | Age: 76
End: 2021-02-23
Payer: MEDICARE

## 2021-02-23 ENCOUNTER — NON-APPOINTMENT (OUTPATIENT)
Age: 76
End: 2021-02-23

## 2021-02-23 VITALS
WEIGHT: 269 LBS | DIASTOLIC BLOOD PRESSURE: 96 MMHG | SYSTOLIC BLOOD PRESSURE: 163 MMHG | HEIGHT: 68 IN | BODY MASS INDEX: 40.77 KG/M2 | OXYGEN SATURATION: 95 % | HEART RATE: 76 BPM

## 2021-02-23 PROBLEM — I10 ESSENTIAL (PRIMARY) HYPERTENSION: Chronic | Status: ACTIVE | Noted: 2020-12-01

## 2021-02-23 PROBLEM — E78.5 HYPERLIPIDEMIA, UNSPECIFIED: Chronic | Status: ACTIVE | Noted: 2020-12-01

## 2021-02-23 PROBLEM — I25.10 ATHEROSCLEROTIC HEART DISEASE OF NATIVE CORONARY ARTERY WITHOUT ANGINA PECTORIS: Chronic | Status: ACTIVE | Noted: 2020-12-01

## 2021-02-23 PROCEDURE — 93280 PM DEVICE PROGR EVAL DUAL: CPT

## 2021-02-23 RX ORDER — TAMSULOSIN HYDROCHLORIDE 0.4 MG/1
0.4 CAPSULE ORAL
Refills: 0 | Status: ACTIVE | COMMUNITY

## 2021-02-23 RX ORDER — FENOFIBRATE 48 MG/1
48 TABLET ORAL DAILY
Refills: 0 | Status: ACTIVE | COMMUNITY

## 2021-04-12 ENCOUNTER — TRANSCRIPTION ENCOUNTER (OUTPATIENT)
Age: 76
End: 2021-04-12

## 2021-05-16 ENCOUNTER — APPOINTMENT (OUTPATIENT)
Dept: ELECTROPHYSIOLOGY | Facility: CLINIC | Age: 76
End: 2021-05-16
Payer: MEDICARE

## 2021-05-17 ENCOUNTER — NON-APPOINTMENT (OUTPATIENT)
Age: 76
End: 2021-05-17

## 2021-05-17 PROCEDURE — 93294 REM INTERROG EVL PM/LDLS PM: CPT

## 2021-05-17 PROCEDURE — 93296 REM INTERROG EVL PM/IDS: CPT

## 2021-08-16 ENCOUNTER — APPOINTMENT (OUTPATIENT)
Dept: ELECTROPHYSIOLOGY | Facility: CLINIC | Age: 76
End: 2021-08-16
Payer: MEDICARE

## 2021-08-17 ENCOUNTER — NON-APPOINTMENT (OUTPATIENT)
Age: 76
End: 2021-08-17

## 2021-08-17 PROCEDURE — 93296 REM INTERROG EVL PM/IDS: CPT

## 2021-08-17 PROCEDURE — 93294 REM INTERROG EVL PM/LDLS PM: CPT

## 2021-10-11 NOTE — DISCHARGE NOTE PROVIDER - HOSPITAL COURSE
Procedure(s):  RIGHT CARPAL TUNNEL RELEASE  RIGHT MIDDLE FINGER TRIGGER RELEASE.    total IV anesthesia    Anesthesia Post Evaluation      Multimodal analgesia: multimodal analgesia used between 6 hours prior to anesthesia start to PACU discharge  Patient location during evaluation: bedside  Patient participation: complete - patient participated  Level of consciousness: awake  Pain score: 0  Pain management: adequate  Airway patency: patent  Anesthetic complications: no  Cardiovascular status: acceptable and stable  Respiratory status: acceptable and room air  Hydration status: acceptable  Post anesthesia nausea and vomiting:  none  Final Post Anesthesia Temperature Assessment:  Normothermia (36.0-37.5 degrees C)      INITIAL Post-op Vital signs:   Vitals Value Taken Time   /94 10/11/21 0805   Temp 36.3 °C (97.4 °F) 10/11/21 0740   Pulse 62 10/11/21 0805   Resp 16 10/11/21 0805   SpO2 97 % 10/11/21 0805 CHIEF COMPLAINT/DIAGNOSIS: dizziness, near syncope    HPI: 75 year old male patient with pertinent history of CAD presented to the ED complaining of dizziness associated with near syncope. Patient states he has been dizzy for the entire day and had to lean against a wall in his house twice or he would pass out. Denies any associated palpitations, chest pain or visual changes. States he has been having progressively worsening shortness of breath over a 1 year period. Denies any fever, chills, sick contacts, falls, nausea or vomiting. In the ED patient had PPM interrogated. NO EKG changes or telemetry events noted. There was an incidental discovery of positive COVID.     12/2: feeling well. no complaints. denies sob, dizziness, or CP  REVIEW OF SYSTEMS:  All other review of systems is negative unless indicated above    PHYSICAL EXAM:  Constitutional: Awake and alert, well-developed  HEENT: PERR, EOMI, Normal Hearing, MMM  Neck: Soft and supple, No LAD, No JVD  Respiratory: Breath sounds are diminished b/l  Cardiovascular: S1 and S2, regular rate and rhythm, no Murmurs, gallops or rubs  Gastrointestinal: Bowel Sounds present, soft, nontender, nondistended, no guarding, no rebound  Extremities: ble peripheral edema, pitting +1/2 edema   Vascular: 2+ peripheral pulses  Neurological: A/O x 3, no focal deficits  Musculoskeletal: 5/5 strength b/l upper and lower extremities  Skin: No rashes    Vital Signs Last 24 Hrs  T(C): 36.7 (02 Dec 2020 08:51), Max: 36.7 (02 Dec 2020 08:51)  T(F): 98.1 (02 Dec 2020 08:51), Max: 98.1 (02 Dec 2020 08:51)  HR: 64 (01 Dec 2020 22:18) (64 - 81)  BP: 142/62 (01 Dec 2020 22:18) (142/62 - 175/88)  BP(mean): --  RR: 18 (02 Dec 2020 08:51) (18 - 20)  SpO2: 96% (02 Dec 2020 08:51) (96% - 100%) -- room air    LABS: All Labs Reviewed  radiology, medications; all reviewed     TELEMETRY REVIEW:  12/3: A-Paced. 02 100%RA    ASSESSMENT AND PLAN:    1) Presyncope likely due to dehydration  - monitor on tele. Paced  - PPM interrogated - low afib burden <1%  - Echo pending  - check orthostatics > positive. cont. gentle hydration  - infectious w/u negative: UA, CXR neg.  - tsh wnl  - cardio consult     2) PAfib | PPM   - Cont. Coumadin / BB  - monitor INR  - PPM interrogated     3) HTN | HTN  - Cont. metoprolol, statin     4) COVID -19 PNA  - currently asymptomatic  - supportive care, maintain isolation     5) DVT ppx  - Cont. Coumadin    Dispo: discharge to home in stable condition    Final diagnosis, treatment plan, and follow-up recommendations were discussed and explained to the patient. The patient was given an opportunity to ask questions concerning the diagnosis and treatment plan. The patient acknowledged understanding of the diagnosis, treatment, and follow-up recommendations. The patient was advised to seek urgent care upon discharge if worsening symptoms develop prior to scheduled follow-up. Time spent on discharge included time with the patient, and also coordinating discharge care as outlined below.    Total time spent: 50 min                CHIEF COMPLAINT/DIAGNOSIS: dizziness, near syncope    HPI: 75 year old male patient with pertinent history of CAD presented to the ED complaining of dizziness associated with near syncope. Patient states he has been dizzy for the entire day and had to lean against a wall in his house twice or he would pass out. Denies any associated palpitations, chest pain or visual changes. States he has been having progressively worsening shortness of breath over a 1 year period. Denies any fever, chills, sick contacts, falls, nausea or vomiting. In the ED patient had PPM interrogated. NO EKG changes or telemetry events noted. There was an incidental discovery of positive COVID.     12/3: feeling well. no complaints. denies sob, dizziness, or CP  REVIEW OF SYSTEMS:  All other review of systems is negative unless indicated above    PHYSICAL EXAM:  Constitutional: Awake and alert, well-developed  HEENT: PERR, EOMI, Normal Hearing, MMM  Neck: Soft and supple, No LAD, No JVD  Respiratory: Breath sounds are diminished b/l  Cardiovascular: S1 and S2, regular rate and rhythm, no Murmurs, gallops or rubs  Gastrointestinal: Bowel Sounds present, soft, nontender, nondistended, no guarding, no rebound  Extremities: ble peripheral edema, pitting +1/2 edema   Vascular: 2+ peripheral pulses  Neurological: A/O x 3, no focal deficits  Musculoskeletal: 5/5 strength b/l upper and lower extremities  Skin: No rashes    Vital Signs Last 24 Hrs  T(C): 37 (03 Dec 2020 08:46), Max: 37 (03 Dec 2020 08:46)  T(F): 98.6 (03 Dec 2020 08:46), Max: 98.6 (03 Dec 2020 08:46)  HR: 99 (03 Dec 2020 08:46) (69 - 99)  BP: 133/69 (03 Dec 2020 08:46) (96/50 - 133/69)  BP(mean): --  RR: 18 (03 Dec 2020 08:46) (18 - 18)  SpO2: 94% (03 Dec 2020 08:46) (94% - 100%) - room air    LABS: All Labs Reviewed  radiology, medications; all reviewed     TELEMETRY REVIEW:  12/3: A-Paced. 02 100%RA    ASSESSMENT AND PLAN:    1) Presyncope likely due to dehydration  - monitor on tele. Paced  - PPM interrogated - low afib burden <1%  - Echo pending  - check orthostatics > positive. cont. gentle hydration  - infectious w/u negative: UA, CXR neg.  - tsh wnl  - cardio consult     2) PAfib | PPM   - Cont. Coumadin / BB  - monitor INR  - PPM interrogated     3) HTN | HTN  - Cont. metoprolol, statin     4) COVID -19 PNA  - currently asymptomatic  - supportive care, maintain isolation     5) DVT ppx  - Cont. Coumadin    Dispo: discharge to home in stable condition    Final diagnosis, treatment plan, and follow-up recommendations were discussed and explained to the patient. The patient was given an opportunity to ask questions concerning the diagnosis and treatment plan. The patient acknowledged understanding of the diagnosis, treatment, and follow-up recommendations. The patient was advised to seek urgent care upon discharge if worsening symptoms develop prior to scheduled follow-up. Time spent on discharge included time with the patient, and also coordinating discharge care as outlined below.    Total time spent: 50 min                CHIEF COMPLAINT/DIAGNOSIS: dizziness, near syncope    HPI: 75 year old male patient with pertinent history of CAD presented to the ED complaining of dizziness associated with near syncope. Patient states he has been dizzy for the entire day and had to lean against a wall in his house twice or he would pass out. Denies any associated palpitations, chest pain or visual changes. States he has been having progressively worsening shortness of breath over a 1 year period. Denies any fever, chills, sick contacts, falls, nausea or vomiting. In the ED patient had PPM interrogated. NO EKG changes or telemetry events noted. There was an incidental discovery of positive COVID.     12/3: feeling well. no complaints. denies sob, dizziness, or CP  REVIEW OF SYSTEMS:  All other review of systems is negative unless indicated above    PHYSICAL EXAM:  Constitutional: Awake and alert, well-developed  HEENT: PERR, EOMI, Normal Hearing, MMM  Neck: Soft and supple, No LAD, No JVD  Respiratory: Breath sounds are diminished b/l  Cardiovascular: S1 and S2, regular rate and rhythm, no Murmurs, gallops or rubs  Gastrointestinal: Bowel Sounds present, soft, nontender, nondistended, no guarding, no rebound  Extremities: ble peripheral edema, pitting +1/2 edema   Vascular: 2+ peripheral pulses  Neurological: A/O x 3, no focal deficits  Musculoskeletal: 5/5 strength b/l upper and lower extremities  Skin: No rashes, no jaundice    Vital Signs Last 24 Hrs  T(C): 37 (03 Dec 2020 08:46), Max: 37 (03 Dec 2020 08:46)  T(F): 98.6 (03 Dec 2020 08:46), Max: 98.6 (03 Dec 2020 08:46)  HR: 99 (03 Dec 2020 08:46) (69 - 99)  BP: 133/69 (03 Dec 2020 08:46) (96/50 - 133/69)  BP(mean): --  RR: 18 (03 Dec 2020 08:46) (18 - 18)  SpO2: 94% (03 Dec 2020 08:46) (94% - 100%) - room air    LABS: All Labs Reviewed  radiology, medications; all reviewed     TELEMETRY REVIEW:  12/3: A-Paced. 02 100%RA    ASSESSMENT AND PLAN:    1) Presyncope likely due to dehydration  - monitor on tele. Paced  - PPM interrogated - low afib burden <1%  - Echo pending  - check orthostatics > positive. cont. gentle hydration  - infectious w/u negative: UA, CXR neg.  - tsh wnl  - cardio consult     2) PAfib | PPM   - Cont. Coumadin / BB  - monitor INR  - PPM interrogated     3) HTN | HTN  - Cont. metoprolol, statin     4) COVID -19 PNA  - currently asymptomatic  - supportive care, maintain isolation     5) DVT ppx  - Cont. Coumadin    Dispo: discharge to home in stable condition    Final diagnosis, treatment plan, and follow-up recommendations were discussed and explained to the patient. The patient was given an opportunity to ask questions concerning the diagnosis and treatment plan. The patient acknowledged understanding of the diagnosis, treatment, and follow-up recommendations. The patient was advised to seek urgent care upon discharge if worsening symptoms develop prior to scheduled follow-up. Time spent on discharge included time with the patient, and also coordinating discharge care as outlined below.    Total time spent: 50 min

## 2021-11-15 ENCOUNTER — APPOINTMENT (OUTPATIENT)
Dept: ELECTROPHYSIOLOGY | Facility: CLINIC | Age: 76
End: 2021-11-15
Payer: MEDICARE

## 2021-11-16 ENCOUNTER — NON-APPOINTMENT (OUTPATIENT)
Age: 76
End: 2021-11-16

## 2021-11-16 PROCEDURE — 93296 REM INTERROG EVL PM/IDS: CPT | Mod: NC

## 2021-11-16 PROCEDURE — 93294 REM INTERROG EVL PM/LDLS PM: CPT | Mod: NC

## 2022-02-15 ENCOUNTER — APPOINTMENT (OUTPATIENT)
Dept: ELECTROPHYSIOLOGY | Facility: CLINIC | Age: 77
End: 2022-02-15

## 2022-02-16 ENCOUNTER — APPOINTMENT (OUTPATIENT)
Dept: ELECTROPHYSIOLOGY | Facility: CLINIC | Age: 77
End: 2022-02-16
Payer: MEDICARE

## 2022-02-16 VITALS
HEIGHT: 68 IN | HEART RATE: 79 BPM | WEIGHT: 260 LBS | BODY MASS INDEX: 39.4 KG/M2 | DIASTOLIC BLOOD PRESSURE: 72 MMHG | OXYGEN SATURATION: 97 % | SYSTOLIC BLOOD PRESSURE: 128 MMHG

## 2022-02-16 DIAGNOSIS — Z86.79 PERSONAL HISTORY OF OTHER DISEASES OF THE CIRCULATORY SYSTEM: ICD-10-CM

## 2022-02-16 PROCEDURE — 93280 PM DEVICE PROGR EVAL DUAL: CPT

## 2022-02-16 RX ORDER — MOMETASONE FUROATE AND FORMOTEROL FUMARATE DIHYDRATE 100; 5 UG/1; UG/1
100-5 AEROSOL RESPIRATORY (INHALATION)
Refills: 0 | Status: ACTIVE | COMMUNITY

## 2022-03-11 ENCOUNTER — EMERGENCY (EMERGENCY)
Facility: HOSPITAL | Age: 77
LOS: 0 days | Discharge: OTHER INSTITUTION | End: 2022-03-11
Attending: EMERGENCY MEDICINE
Payer: MEDICARE

## 2022-03-11 VITALS
HEART RATE: 80 BPM | DIASTOLIC BLOOD PRESSURE: 59 MMHG | OXYGEN SATURATION: 98 % | TEMPERATURE: 99 F | RESPIRATION RATE: 20 BRPM | SYSTOLIC BLOOD PRESSURE: 122 MMHG

## 2022-03-11 VITALS
HEART RATE: 83 BPM | RESPIRATION RATE: 18 BRPM | HEIGHT: 78 IN | SYSTOLIC BLOOD PRESSURE: 117 MMHG | OXYGEN SATURATION: 97 % | TEMPERATURE: 98 F | WEIGHT: 259.93 LBS | DIASTOLIC BLOOD PRESSURE: 57 MMHG

## 2022-03-11 DIAGNOSIS — E78.5 HYPERLIPIDEMIA, UNSPECIFIED: ICD-10-CM

## 2022-03-11 DIAGNOSIS — I10 ESSENTIAL (PRIMARY) HYPERTENSION: ICD-10-CM

## 2022-03-11 DIAGNOSIS — R55 SYNCOPE AND COLLAPSE: ICD-10-CM

## 2022-03-11 DIAGNOSIS — I25.10 ATHEROSCLEROTIC HEART DISEASE OF NATIVE CORONARY ARTERY WITHOUT ANGINA PECTORIS: ICD-10-CM

## 2022-03-11 DIAGNOSIS — K66.1 HEMOPERITONEUM: ICD-10-CM

## 2022-03-11 DIAGNOSIS — R10.9 UNSPECIFIED ABDOMINAL PAIN: ICD-10-CM

## 2022-03-11 DIAGNOSIS — I48.91 UNSPECIFIED ATRIAL FIBRILLATION: ICD-10-CM

## 2022-03-11 DIAGNOSIS — Z79.82 LONG TERM (CURRENT) USE OF ASPIRIN: ICD-10-CM

## 2022-03-11 DIAGNOSIS — Z95.1 PRESENCE OF AORTOCORONARY BYPASS GRAFT: Chronic | ICD-10-CM

## 2022-03-11 LAB
ALBUMIN SERPL ELPH-MCNC: 2.8 G/DL — LOW (ref 3.3–5)
ALP SERPL-CCNC: 47 U/L — SIGNIFICANT CHANGE UP (ref 40–120)
ALT FLD-CCNC: 22 U/L — SIGNIFICANT CHANGE UP (ref 12–78)
ANION GAP SERPL CALC-SCNC: 6 MMOL/L — SIGNIFICANT CHANGE UP (ref 5–17)
APTT BLD: 44.2 SEC — HIGH (ref 27.5–35.5)
AST SERPL-CCNC: 20 U/L — SIGNIFICANT CHANGE UP (ref 15–37)
BASOPHILS # BLD AUTO: 0.08 K/UL — SIGNIFICANT CHANGE UP (ref 0–0.2)
BASOPHILS # BLD AUTO: 0.1 K/UL — SIGNIFICANT CHANGE UP (ref 0–0.2)
BASOPHILS NFR BLD AUTO: 0.5 % — SIGNIFICANT CHANGE UP (ref 0–2)
BASOPHILS NFR BLD AUTO: 0.7 % — SIGNIFICANT CHANGE UP (ref 0–2)
BILIRUB SERPL-MCNC: 0.5 MG/DL — SIGNIFICANT CHANGE UP (ref 0.2–1.2)
BUN SERPL-MCNC: 20 MG/DL — SIGNIFICANT CHANGE UP (ref 7–23)
CALCIUM SERPL-MCNC: 8.2 MG/DL — LOW (ref 8.5–10.1)
CHLORIDE SERPL-SCNC: 102 MMOL/L — SIGNIFICANT CHANGE UP (ref 96–108)
CO2 SERPL-SCNC: 25 MMOL/L — SIGNIFICANT CHANGE UP (ref 22–31)
CREAT SERPL-MCNC: 1.1 MG/DL — SIGNIFICANT CHANGE UP (ref 0.5–1.3)
EGFR: 69 ML/MIN/1.73M2 — SIGNIFICANT CHANGE UP
EOSINOPHIL # BLD AUTO: 0.03 K/UL — SIGNIFICANT CHANGE UP (ref 0–0.5)
EOSINOPHIL # BLD AUTO: 0.11 K/UL — SIGNIFICANT CHANGE UP (ref 0–0.5)
EOSINOPHIL NFR BLD AUTO: 0.2 % — SIGNIFICANT CHANGE UP (ref 0–6)
EOSINOPHIL NFR BLD AUTO: 0.7 % — SIGNIFICANT CHANGE UP (ref 0–6)
GLUCOSE SERPL-MCNC: 126 MG/DL — HIGH (ref 70–99)
HCT VFR BLD CALC: 24.4 % — LOW (ref 39–50)
HCT VFR BLD CALC: 26.2 % — LOW (ref 39–50)
HGB BLD-MCNC: 7.6 G/DL — LOW (ref 13–17)
HGB BLD-MCNC: 8.3 G/DL — LOW (ref 13–17)
IMM GRANULOCYTES NFR BLD AUTO: 0.7 % — SIGNIFICANT CHANGE UP (ref 0–1.5)
IMM GRANULOCYTES NFR BLD AUTO: 0.7 % — SIGNIFICANT CHANGE UP (ref 0–1.5)
INR BLD: 1.48 RATIO — HIGH (ref 0.88–1.16)
INR BLD: 3.29 RATIO — HIGH (ref 0.88–1.16)
INR BLD: 3.36 RATIO — HIGH (ref 0.88–1.16)
LACTATE SERPL-SCNC: 2.3 MMOL/L — HIGH (ref 0.7–2)
LYMPHOCYTES # BLD AUTO: 0.67 K/UL — LOW (ref 1–3.3)
LYMPHOCYTES # BLD AUTO: 0.84 K/UL — LOW (ref 1–3.3)
LYMPHOCYTES # BLD AUTO: 4.5 % — LOW (ref 13–44)
LYMPHOCYTES # BLD AUTO: 5.5 % — LOW (ref 13–44)
MCHC RBC-ENTMCNC: 29 PG — SIGNIFICANT CHANGE UP (ref 27–34)
MCHC RBC-ENTMCNC: 29.4 PG — SIGNIFICANT CHANGE UP (ref 27–34)
MCHC RBC-ENTMCNC: 31.1 GM/DL — LOW (ref 32–36)
MCHC RBC-ENTMCNC: 31.7 GM/DL — LOW (ref 32–36)
MCV RBC AUTO: 92.9 FL — SIGNIFICANT CHANGE UP (ref 80–100)
MCV RBC AUTO: 93.1 FL — SIGNIFICANT CHANGE UP (ref 80–100)
MONOCYTES # BLD AUTO: 0.8 K/UL — SIGNIFICANT CHANGE UP (ref 0–0.9)
MONOCYTES # BLD AUTO: 0.8 K/UL — SIGNIFICANT CHANGE UP (ref 0–0.9)
MONOCYTES NFR BLD AUTO: 5.2 % — SIGNIFICANT CHANGE UP (ref 2–14)
MONOCYTES NFR BLD AUTO: 5.4 % — SIGNIFICANT CHANGE UP (ref 2–14)
NEUTROPHILS # BLD AUTO: 13.19 K/UL — HIGH (ref 1.8–7.4)
NEUTROPHILS # BLD AUTO: 13.31 K/UL — HIGH (ref 1.8–7.4)
NEUTROPHILS NFR BLD AUTO: 87.2 % — HIGH (ref 43–77)
NEUTROPHILS NFR BLD AUTO: 88.7 % — HIGH (ref 43–77)
PLATELET # BLD AUTO: 359 K/UL — SIGNIFICANT CHANGE UP (ref 150–400)
PLATELET # BLD AUTO: 383 K/UL — SIGNIFICANT CHANGE UP (ref 150–400)
POTASSIUM SERPL-MCNC: 4 MMOL/L — SIGNIFICANT CHANGE UP (ref 3.5–5.3)
POTASSIUM SERPL-SCNC: 4 MMOL/L — SIGNIFICANT CHANGE UP (ref 3.5–5.3)
PROT SERPL-MCNC: 6.1 GM/DL — SIGNIFICANT CHANGE UP (ref 6–8.3)
PROTHROM AB SERPL-ACNC: 17.2 SEC — HIGH (ref 10.5–13.4)
PROTHROM AB SERPL-ACNC: 38.6 SEC — HIGH (ref 10.5–13.4)
PROTHROM AB SERPL-ACNC: 39.5 SEC — HIGH (ref 10.5–13.4)
RBC # BLD: 2.62 M/UL — LOW (ref 4.2–5.8)
RBC # BLD: 2.82 M/UL — LOW (ref 4.2–5.8)
RBC # FLD: 13.8 % — SIGNIFICANT CHANGE UP (ref 10.3–14.5)
RBC # FLD: 13.9 % — SIGNIFICANT CHANGE UP (ref 10.3–14.5)
SARS-COV-2 RNA SPEC QL NAA+PROBE: SIGNIFICANT CHANGE UP
SODIUM SERPL-SCNC: 133 MMOL/L — LOW (ref 135–145)
WBC # BLD: 14.88 K/UL — HIGH (ref 3.8–10.5)
WBC # BLD: 15.27 K/UL — HIGH (ref 3.8–10.5)
WBC # FLD AUTO: 14.88 K/UL — HIGH (ref 3.8–10.5)
WBC # FLD AUTO: 15.27 K/UL — HIGH (ref 3.8–10.5)

## 2022-03-11 PROCEDURE — U0005: CPT

## 2022-03-11 PROCEDURE — 85610 PROTHROMBIN TIME: CPT

## 2022-03-11 PROCEDURE — 80053 COMPREHEN METABOLIC PANEL: CPT

## 2022-03-11 PROCEDURE — P9016: CPT

## 2022-03-11 PROCEDURE — 86900 BLOOD TYPING SEROLOGIC ABO: CPT

## 2022-03-11 PROCEDURE — 74177 CT ABD & PELVIS W/CONTRAST: CPT | Mod: 26,MA

## 2022-03-11 PROCEDURE — 84484 ASSAY OF TROPONIN QUANT: CPT

## 2022-03-11 PROCEDURE — 87086 URINE CULTURE/COLONY COUNT: CPT

## 2022-03-11 PROCEDURE — 36430 TRANSFUSION BLD/BLD COMPNT: CPT

## 2022-03-11 PROCEDURE — 93010 ELECTROCARDIOGRAM REPORT: CPT

## 2022-03-11 PROCEDURE — 86920 COMPATIBILITY TEST SPIN: CPT

## 2022-03-11 PROCEDURE — 36415 COLL VENOUS BLD VENIPUNCTURE: CPT

## 2022-03-11 PROCEDURE — 85025 COMPLETE CBC W/AUTO DIFF WBC: CPT

## 2022-03-11 PROCEDURE — 86901 BLOOD TYPING SEROLOGIC RH(D): CPT

## 2022-03-11 PROCEDURE — U0003: CPT

## 2022-03-11 PROCEDURE — 86850 RBC ANTIBODY SCREEN: CPT

## 2022-03-11 PROCEDURE — 87040 BLOOD CULTURE FOR BACTERIA: CPT

## 2022-03-11 PROCEDURE — 96374 THER/PROPH/DIAG INJ IV PUSH: CPT | Mod: XU

## 2022-03-11 PROCEDURE — 71045 X-RAY EXAM CHEST 1 VIEW: CPT

## 2022-03-11 PROCEDURE — 96365 THER/PROPH/DIAG IV INF INIT: CPT

## 2022-03-11 PROCEDURE — 96376 TX/PRO/DX INJ SAME DRUG ADON: CPT

## 2022-03-11 PROCEDURE — 76705 ECHO EXAM OF ABDOMEN: CPT

## 2022-03-11 PROCEDURE — 71045 X-RAY EXAM CHEST 1 VIEW: CPT | Mod: 26

## 2022-03-11 PROCEDURE — 99285 EMERGENCY DEPT VISIT HI MDM: CPT | Mod: 25

## 2022-03-11 PROCEDURE — 85730 THROMBOPLASTIN TIME PARTIAL: CPT

## 2022-03-11 PROCEDURE — 83605 ASSAY OF LACTIC ACID: CPT | Mod: 91

## 2022-03-11 PROCEDURE — 93005 ELECTROCARDIOGRAM TRACING: CPT

## 2022-03-11 PROCEDURE — 99291 CRITICAL CARE FIRST HOUR: CPT

## 2022-03-11 PROCEDURE — 74177 CT ABD & PELVIS W/CONTRAST: CPT | Mod: MA

## 2022-03-11 PROCEDURE — 96375 TX/PRO/DX INJ NEW DRUG ADDON: CPT

## 2022-03-11 RX ORDER — SODIUM CHLORIDE 9 MG/ML
2000 INJECTION INTRAMUSCULAR; INTRAVENOUS; SUBCUTANEOUS ONCE
Refills: 0 | Status: COMPLETED | OUTPATIENT
Start: 2022-03-11 | End: 2022-03-11

## 2022-03-11 RX ORDER — VANCOMYCIN HCL 1 G
1000 VIAL (EA) INTRAVENOUS ONCE
Refills: 0 | Status: DISCONTINUED | OUTPATIENT
Start: 2022-03-11 | End: 2022-03-11

## 2022-03-11 RX ORDER — MORPHINE SULFATE 50 MG/1
4 CAPSULE, EXTENDED RELEASE ORAL ONCE
Refills: 0 | Status: DISCONTINUED | OUTPATIENT
Start: 2022-03-11 | End: 2022-03-11

## 2022-03-11 RX ORDER — MORPHINE SULFATE 50 MG/1
2 CAPSULE, EXTENDED RELEASE ORAL ONCE
Refills: 0 | Status: DISCONTINUED | OUTPATIENT
Start: 2022-03-11 | End: 2022-03-11

## 2022-03-11 RX ORDER — SODIUM CHLORIDE 9 MG/ML
1000 INJECTION, SOLUTION INTRAVENOUS ONCE
Refills: 0 | Status: COMPLETED | OUTPATIENT
Start: 2022-03-11 | End: 2022-03-11

## 2022-03-11 RX ORDER — METRONIDAZOLE 500 MG
500 TABLET ORAL ONCE
Refills: 0 | Status: COMPLETED | OUTPATIENT
Start: 2022-03-11 | End: 2022-03-11

## 2022-03-11 RX ORDER — PHYTONADIONE (VIT K1) 5 MG
5 TABLET ORAL ONCE
Refills: 0 | Status: DISCONTINUED | OUTPATIENT
Start: 2022-03-11 | End: 2022-03-11

## 2022-03-11 RX ORDER — PROTHROMBIN COMPLEX CONCENTRATE (HUMAN) 25.5; 16.5; 24; 22; 22; 26 [IU]/ML; [IU]/ML; [IU]/ML; [IU]/ML; [IU]/ML; [IU]/ML
1500 POWDER, FOR SOLUTION INTRAVENOUS ONCE
Refills: 0 | Status: COMPLETED | OUTPATIENT
Start: 2022-03-11 | End: 2022-03-11

## 2022-03-11 RX ORDER — VANCOMYCIN HCL 1 G
1750 VIAL (EA) INTRAVENOUS ONCE
Refills: 0 | Status: COMPLETED | OUTPATIENT
Start: 2022-03-11 | End: 2022-03-11

## 2022-03-11 RX ORDER — PIPERACILLIN AND TAZOBACTAM 4; .5 G/20ML; G/20ML
3.38 INJECTION, POWDER, LYOPHILIZED, FOR SOLUTION INTRAVENOUS ONCE
Refills: 0 | Status: COMPLETED | OUTPATIENT
Start: 2022-03-11 | End: 2022-03-11

## 2022-03-11 RX ORDER — ONDANSETRON 8 MG/1
4 TABLET, FILM COATED ORAL ONCE
Refills: 0 | Status: COMPLETED | OUTPATIENT
Start: 2022-03-11 | End: 2022-03-11

## 2022-03-11 RX ORDER — PHYTONADIONE (VIT K1) 5 MG
5 TABLET ORAL ONCE
Refills: 0 | Status: COMPLETED | OUTPATIENT
Start: 2022-03-11 | End: 2022-03-11

## 2022-03-11 RX ADMIN — PROTHROMBIN COMPLEX CONCENTRATE (HUMAN) 400 INTERNATIONAL UNIT(S): 25.5; 16.5; 24; 22; 22; 26 POWDER, FOR SOLUTION INTRAVENOUS at 09:04

## 2022-03-11 RX ADMIN — Medication 250 MILLIGRAM(S): at 09:34

## 2022-03-11 RX ADMIN — MORPHINE SULFATE 2 MILLIGRAM(S): 50 CAPSULE, EXTENDED RELEASE ORAL at 13:16

## 2022-03-11 RX ADMIN — PIPERACILLIN AND TAZOBACTAM 200 GRAM(S): 4; .5 INJECTION, POWDER, LYOPHILIZED, FOR SOLUTION INTRAVENOUS at 07:00

## 2022-03-11 RX ADMIN — MORPHINE SULFATE 4 MILLIGRAM(S): 50 CAPSULE, EXTENDED RELEASE ORAL at 06:17

## 2022-03-11 RX ADMIN — SODIUM CHLORIDE 1000 MILLILITER(S): 9 INJECTION, SOLUTION INTRAVENOUS at 08:22

## 2022-03-11 RX ADMIN — ONDANSETRON 4 MILLIGRAM(S): 8 TABLET, FILM COATED ORAL at 06:16

## 2022-03-11 RX ADMIN — PROTHROMBIN COMPLEX CONCENTRATE (HUMAN) 1500 INTERNATIONAL UNIT(S): 25.5; 16.5; 24; 22; 22; 26 POWDER, FOR SOLUTION INTRAVENOUS at 09:13

## 2022-03-11 RX ADMIN — MORPHINE SULFATE 4 MILLIGRAM(S): 50 CAPSULE, EXTENDED RELEASE ORAL at 06:44

## 2022-03-11 RX ADMIN — Medication 100 MILLIGRAM(S): at 08:22

## 2022-03-11 RX ADMIN — SODIUM CHLORIDE 2000 MILLILITER(S): 9 INJECTION INTRAMUSCULAR; INTRAVENOUS; SUBCUTANEOUS at 06:17

## 2022-03-11 RX ADMIN — Medication 5 MILLIGRAM(S): at 09:33

## 2022-03-11 NOTE — ED ADULT NURSE NOTE - OBJECTIVE STATEMENT
75 y/o a&ox4 male pt. presents to the ED c/o dizziness and syncope s/p abdominal surgery 2 weeks ago. pt. states he had colon cancer and had a piece of his colon removed with no complications. since surgery pt. has had increased abdominal pain, cramping, and dizziness since yesterday afternoon. denies fevers, n/v/d.

## 2022-03-11 NOTE — ED ADULT NURSE NOTE - NS_SISCREENINGSR_GEN_ALL_ED
Negative Ear Star Wedge Flap Text: The defect edges were debeveled with a #15 blade scalpel.  Given the location of the defect and the proximity to free margins (helical rim) an ear star wedge flap was deemed most appropriate.  Using a sterile surgical marker, the appropriate flap was drawn incorporating the defect and placing the expected incisions between the helical rim and antihelix where possible.  The area thus outlined was incised through and through with a #15 scalpel blade.

## 2022-03-11 NOTE — ED ADULT NURSE REASSESSMENT NOTE - NS ED NURSE REASSESS COMMENT FT1
Report given to Doe tucker in sicu, report given to Silvestre for transfer at this time, all necessary paperwork established and given to EMS.

## 2022-03-11 NOTE — ED PROVIDER NOTE - PROGRESS NOTE DETAILS
pt signed out to me pending CT. I assessed patient -exam with diffuse abd tenderness and distension. bedside US performed with FF in right upper quadrant LUQ and some findings concerning for pneumoperitoneum. abx ordered .Placed call to patients surgeon, called on call surgeon Dr. Lund, called CT to expidite scan. Jose M Duron M.D., Attending Physician spoke with Dr. Ayala - agrees with plan will arrange for transfer to [place] pending CT. Jose M Duron M.D., Attending Physician spoke with radiology pt with hemoperitoneum. pt hd stable at this time. spoke with Dr. Ayala agrees to transfer and 1U prbc and kcentra. Dr. Lund at bedside agrees. Dr. Lund and Dr. Huang spoke on phone agrees that patient should be transferred and stable at this time. Jose M Duron M.D., Attending Physician [facility] transfer center. Jose M Duron M.D., Attending Physician

## 2022-03-11 NOTE — ED ADULT TRIAGE NOTE - CCCP TRG CHIEF CMPLNT
Discharge Planner OT   4A    Patient plan for discharge: home w A from wife prn   Current status: Pt currently with new NWB precautions R UE 2/2 flap, needed some cues for safety although Mod I bed mobility and unilateral support on IV pole hallway ambulation.  *Noted R scapular winging in neutral, reports mm pain when abducting    Barriers to return to prior living situation: medical needs  Recommendations for discharge: home w A PRN  Rationale for recommendations: pt with new precautions, will need assist with heavy ADLs and IADLs, anticipate at dc will be IND and safe to dc home w A       Entered by: Gilda Sun 04/24/2020 12:41 PM        syncope

## 2022-03-11 NOTE — ED PROVIDER NOTE - CLINICAL SUMMARY MEDICAL DECISION MAKING FREE TEXT BOX
Pt on Warfarin s/p lap partial colon resection p/w abdominal distention, abdominal pain and syncope with orthostatic drop in BP at the scene per EMS.  Plan: IV fluids, close VS monitoring, CBC, CMP, lactate, type/screen, CTAP and consult with his colorectal surgeon.  Workup ordered and signed out to Dr. Duron at 7 AM with likely transfer to [facility],

## 2022-03-11 NOTE — CONSULT NOTE ADULT - SUBJECTIVE AND OBJECTIVE BOX
77 yo male, obese , h/o HTN, CAD, Afib, CABG, on coumadin who underwent a laparoscopic colon resection (sigmoid?) 2 weeks ago at [place] by Dr Huang; patient refers having some diffuse abdominal pain last night, pain not relieved by anything. Patient brought by EMS. Ct Abd showed ascitis, a lot of subcutaneous abdominal air. Normotensive, afebrile.    HPI:      PAST MEDICAL & SURGICAL HISTORY:  Atrial fibrillation    Mitral regurgitation    Hypertension, unspecified type    HLD (hyperlipidemia)    CAD (coronary artery disease)    S/P rotator cuff surgery    S/P CABG x 3        REVIEW OF SYSTEMS:    CONSTITUTIONAL: No weakness, fevers or chills  EYES/ENT: No visual changes;  No vertigo or throat pain   NECK: No pain or stiffness  RESPIRATORY: No cough, wheezing, hemoptysis; No shortness of breath  CARDIOVASCULAR: No chest pain or palpitations  GASTROINTESTINAL:  abdominal  pain. No nausea, vomiting, or hematemesis; No diarrhea or constipation. No melena or hematochezia.  GENITOURINARY: No dysuria, frequency or hematuria  NEUROLOGICAL: No numbness or weakness  SKIN: No itching, burning, rashes, or lesions   All other review of systems is negative unless indicated above.    MEDICATIONS  (STANDING):  lactated ringers Bolus 1000 milliLiter(s) IV Bolus once  metroNIDAZOLE  IVPB 500 milliGRAM(s) IV Intermittent once  vancomycin  IVPB 1750 milliGRAM(s) IV Intermittent Once    MEDICATIONS  (PRN):      Allergies    No Known Allergies    Intolerances        SOCIAL HISTORY: non smoker    FAMILY HISTORY: non contributory      Vital Signs Last 24 Hrs  T(C): 36.8 (11 Mar 2022 07:29), Max: 36.8 (11 Mar 2022 07:29)  T(F): 98.2 (11 Mar 2022 07:29), Max: 98.2 (11 Mar 2022 07:29)  HR: 79 (11 Mar 2022 07:29) (79 - 83)  BP: 111/66 (11 Mar 2022 07:29) (111/66 - 117/57)  BP(mean): 79 (11 Mar 2022 07:29) (79 - 79)  RR: 17 (11 Mar 2022 07:29) (17 - 18)  SpO2: 99% (11 Mar 2022 07:29) (97% - 99%)    .  VITAL SIGNS:  T(C): 36.8 (03-11-22 @ 07:29), Max: 36.8 (03-11-22 @ 07:29)  T(F): 98.2 (03-11-22 @ 07:29), Max: 98.2 (03-11-22 @ 07:29)  HR: 79 (03-11-22 @ 07:29) (79 - 83)  BP: 111/66 (03-11-22 @ 07:29) (111/66 - 117/57)  BP(mean): 79 (03-11-22 @ 07:29) (79 - 79)  RR: 17 (03-11-22 @ 07:29) (17 - 18)  SpO2: 99% (03-11-22 @ 07:29) (97% - 99%)  Wt(kg): --    PHYSICAL EXAM:    Constitutional: resting  in bed; NAD  Eyes: PERRL, EOMI, anicteric sclera  ENT: no nasal discharge; uvula midline, no oropharyngeal erythema or exudates  Neck: supple; no JVD or thyromegaly  Respiratory: CTA B/L; no W/R/R, no retractions  Cardiac: +S1/S2; RRR; no murmurs  Gastrointestinal: obese, distended, multiple 5 mm port sites, lower pfannestiel, Rebound tenderness over right side of abdomen, some blood draining from lower abdominal port site  Back: spine midline, no bony tenderness or step-offs; no CVAT B/L  Extremities: no clubbing or cyanosis; +++ lower ext peripheral edema  Musculoskeletal: NROM x2; no joint swelling, tenderness or erythema  Vascular: 2+ radial, femoral,  Dermatologic: skin warm, dry and intact; no rashes, wounds, or scars  Lymphatic: no submandibular or cervical LAD  Neurologic: AAOx3; CNII-XII grossly intact; no focal deficits  Psychiatric: affect and characteristics of appearance, verbalizations, behaviors are appropriate    LABS:                        8.3    15.27 )-----------( 383      ( 11 Mar 2022 05:38 )             26.2       03-11    133<L>  |  102  |  20  ----------------------------<  126<H>  4.0   |  25  |  1.10    Ca    8.2<L>      11 Mar 2022 05:38    TPro  6.1  /  Alb  2.8<L>  /  TBili  0.5  /  DBili  x   /  AST  20  /  ALT  22  /  AlkPhos  47  03-11      PT/INR - ( 11 Mar 2022 05:38 )   PT: 39.5 sec;   INR: 3.36 ratio         PTT - ( 11 Mar 2022 05:38 )  PTT:44.2 sec        RADIOLOGY & ADDITIONAL STUDIES:  CT Abd: awaiting for official read

## 2022-03-11 NOTE — CONSULT NOTE ADULT - ASSESSMENT
77 yo male obese, h/o Afib, HTN, CABG, hemoperitoneum on CT scan, air in subcut space, hemoglob 8.  -I think patient  77 yo male obese, h/o Afib, HTN, CABG, hemoperitoneum on CT scan, air in subcut space, hemoglob 8. Normotensive, afebrile. INR 3.3  -I think patient has an anastomotic leak, he has rebound with hemoperitoneum. Patient will get transferred to [place]. case discuss w Dr Huang.  -Will get 1lt Ringer  -Will get 1u PRBC  -Will need FFp  -Received Zosyn, flagyl  -NPO

## 2022-03-11 NOTE — ED PROVIDER NOTE - CROS ED RESP ALL NEG
Refill Approved    Rx renewed per Medication Renewal Policy. Medication was last renewed on 7/15/2019.    Alesia Vallejo, [facility] Triage/Med Refill 10/4/2020     Requested Prescriptions   Pending Prescriptions Disp Refills     allopurinoL (ZYLOPRIM) 100 MG tablet [Pharmacy Med Name: ALLOPURINOL 100 MG TABLET] 180 tablet 0     Sig: TAKE 2 TABLETS BY MOUTH EVERY DAY       Allopurinol/Febuxostat Refill Protocol  Passed - 10/1/2020 12:14 AM        Passed - LFT or AST or ALT in last 12 months     Albumin   Date Value Ref Range Status   08/31/2020 4.1 3.5 - 5.0 g/dL Final     Bilirubin, Total   Date Value Ref Range Status   08/31/2020 0.7 0.0 - 1.0 mg/dL Final     Alkaline Phosphatase   Date Value Ref Range Status   08/31/2020 88 45 - 120 U/L Final     AST   Date Value Ref Range Status   08/31/2020 20 0 - 40 U/L Final     ALT   Date Value Ref Range Status   08/31/2020 21 0 - 45 U/L Final     Protein, Total   Date Value Ref Range Status   08/31/2020 6.5 6.0 - 8.0 g/dL Final                Passed - Visit with PCP or prescribing provider visit in past 12 months     Last office visit with prescriber/PCP: 10/2/2018 Guillermo Wharton MD OR same dept: Visit date not found OR same specialty: 10/2/2018 Guillermo Wharton MD  Last physical: 8/28/2020 Last MTM visit: Visit date not found   Next visit within 3 mo: Visit date not found  Next physical within 3 mo: Visit date not found  Prescriber OR PCP: Guillermo Wharton MD  Last diagnosis associated with med order: 1. Gout  - allopurinoL (ZYLOPRIM) 100 MG tablet [Pharmacy Med Name: ALLOPURINOL 100 MG TABLET]; TAKE 2 TABLETS BY MOUTH EVERY DAY  Dispense: 180 tablet; Refill: 0    If protocol passes may refill for 12 months if within 3 months of last provider visit (or a total of 15 months).                             negative...

## 2022-03-11 NOTE — ED ADULT NURSE NOTE - NSIMPLEMENTINTERV_GEN_ALL_ED
Implemented All Universal Safety Interventions:  [place] to call system. Call bell, personal items and telephone within reach. Instruct patient to call for assistance. Room bathroom lighting operational. Non-slip footwear when patient is off stretcher. Physically safe environment: no spills, clutter or unnecessary equipment. Stretcher in lowest position, wheels locked, appropriate side rails in place.

## 2022-03-11 NOTE — ED PROVIDER NOTE - OBJECTIVE STATEMENT
77 y/o M with h/o CAD s/p CABG x 3, Afib on coumadin, HTN, and laparoscopic colon resection by Dr. Sanjiv Ayala at [place] 2 weeks ago BIBEMS for diffuse abdominal pain with distention and orthostatic hypotension noted by EMS with SBP in 70s on standing.  Pt notes he felt well during his outpatient follow up with Colorectal surgery yesterday morning but started having diffuse cramping and pressure in his abdomen in the afternoon progressively getting worse.  Pt had a normal BM yesterday morning but none afterwards.  Pt denies vomiting.  Pt denies melena or BRBPR.  No f/c/r. Pt notes feeling very dizzy and lightheaded on standing.  Dr. Ayala [phone]

## 2022-03-11 NOTE — ED PROVIDER NOTE - NSTIMEPROVIDERCAREINITIATE_GEN_ER
Pt frequently requests snacks during shift. Educated pt on importance of eating foods with low sugar, and low carbohydrates. After educating, pt requested mac & cheese during night. Pt educated that the mac and cheese had 48Grams of carbs. [employer] print out given to patient on eating a diabetes friendly diet. Pt up to [facility] during night grabbing snacks of juices. Ordered diabetes education consult.    11-Mar-2022 05:35

## 2022-03-11 NOTE — ED ADULT TRIAGE NOTE - CHIEF COMPLAINT QUOTE
BIBA from home sp two syncope episodes w/o fall. Had Colon resection 2 weeks ago. Developed abdominal cramps and bloating yesterday. Today felt dizzy when getting out of the bed then syncopied, pt. lowered himself to the bed. pt. had another episode of syncope after EMS arrival. +orthostatics. SBP in 70s when standing up. Last BM yesterday. denies bloody stools or hematuria. -fevers, chills, chest pain, SOB. on coumadin. PMH colon CA, PPM, MVR, Bypass sx. pt. arrived with 18G L PIV. total of 600cc 0.9%NaCl bolus.

## 2022-03-11 NOTE — ED ADULT NURSE REASSESSMENT NOTE - NS ED NURSE REASSESS COMMENT FT1
Assumed care of pt at this time from Renetta KEARNS R.N, pt observed safe with stretcher in lowest position, abd distended, surgical sites C/D/I, awaiting pt to go to ct.

## 2022-03-12 LAB
CULTURE RESULTS: SIGNIFICANT CHANGE UP
SPECIMEN SOURCE: SIGNIFICANT CHANGE UP

## 2022-03-16 LAB
CULTURE RESULTS: SIGNIFICANT CHANGE UP
CULTURE RESULTS: SIGNIFICANT CHANGE UP
SPECIMEN SOURCE: SIGNIFICANT CHANGE UP
SPECIMEN SOURCE: SIGNIFICANT CHANGE UP

## 2022-05-17 ENCOUNTER — NON-APPOINTMENT (OUTPATIENT)
Age: 77
End: 2022-05-17

## 2022-05-17 ENCOUNTER — APPOINTMENT (OUTPATIENT)
Dept: ELECTROPHYSIOLOGY | Facility: CLINIC | Age: 77
End: 2022-05-17
Payer: MEDICARE

## 2022-05-17 PROCEDURE — 93296 REM INTERROG EVL PM/IDS: CPT

## 2022-05-17 PROCEDURE — 93294 REM INTERROG EVL PM/LDLS PM: CPT

## 2022-07-08 RX ORDER — FLUCONAZOLE 150 MG/1
0 TABLET ORAL
Qty: 0 | Refills: 0 | DISCHARGE
Start: 2022-07-08 | End: 2022-07-17

## 2022-07-14 ENCOUNTER — INPATIENT (INPATIENT)
Facility: HOSPITAL | Age: 77
LOS: 3 days | Discharge: HOME HEALTH | DRG: 291 | End: 2022-07-18
Attending: STUDENT IN AN ORGANIZED HEALTH CARE EDUCATION/TRAINING PROGRAM | Admitting: HOSPITALIST
Payer: MEDICARE

## 2022-07-14 VITALS — WEIGHT: 250 LBS | HEIGHT: 68 IN

## 2022-07-14 DIAGNOSIS — Z95.1 PRESENCE OF AORTOCORONARY BYPASS GRAFT: Chronic | ICD-10-CM

## 2022-07-14 DIAGNOSIS — I50.23 ACUTE ON CHRONIC SYSTOLIC (CONGESTIVE) HEART FAILURE: ICD-10-CM

## 2022-07-14 LAB
ALBUMIN SERPL ELPH-MCNC: 3.4 G/DL — SIGNIFICANT CHANGE UP (ref 3.3–5)
ALP SERPL-CCNC: 52 U/L — SIGNIFICANT CHANGE UP (ref 40–120)
ALT FLD-CCNC: 25 U/L — SIGNIFICANT CHANGE UP (ref 12–78)
ANION GAP SERPL CALC-SCNC: 2 MMOL/L — LOW (ref 5–17)
APTT BLD: 66.8 SEC — HIGH (ref 27.5–35.5)
AST SERPL-CCNC: 18 U/L — SIGNIFICANT CHANGE UP (ref 15–37)
BASOPHILS # BLD AUTO: 0.12 K/UL — SIGNIFICANT CHANGE UP (ref 0–0.2)
BASOPHILS NFR BLD AUTO: 1.2 % — SIGNIFICANT CHANGE UP (ref 0–2)
BILIRUB SERPL-MCNC: 0.7 MG/DL — SIGNIFICANT CHANGE UP (ref 0.2–1.2)
BUN SERPL-MCNC: 21 MG/DL — SIGNIFICANT CHANGE UP (ref 7–23)
CALCIUM SERPL-MCNC: 9.2 MG/DL — SIGNIFICANT CHANGE UP (ref 8.5–10.1)
CHLORIDE SERPL-SCNC: 107 MMOL/L — SIGNIFICANT CHANGE UP (ref 96–108)
CO2 SERPL-SCNC: 29 MMOL/L — SIGNIFICANT CHANGE UP (ref 22–31)
CREAT SERPL-MCNC: 1.26 MG/DL — SIGNIFICANT CHANGE UP (ref 0.5–1.3)
EGFR: 58 ML/MIN/1.73M2 — LOW
EOSINOPHIL # BLD AUTO: 0.12 K/UL — SIGNIFICANT CHANGE UP (ref 0–0.5)
EOSINOPHIL NFR BLD AUTO: 1.2 % — SIGNIFICANT CHANGE UP (ref 0–6)
GLUCOSE SERPL-MCNC: 95 MG/DL — SIGNIFICANT CHANGE UP (ref 70–99)
HCT VFR BLD CALC: 38.4 % — LOW (ref 39–50)
HGB BLD-MCNC: 12 G/DL — LOW (ref 13–17)
IMM GRANULOCYTES NFR BLD AUTO: 0.4 % — SIGNIFICANT CHANGE UP (ref 0–1.5)
INR BLD: 5.43 RATIO — CRITICAL HIGH (ref 0.88–1.16)
LYMPHOCYTES # BLD AUTO: 1.17 K/UL — SIGNIFICANT CHANGE UP (ref 1–3.3)
LYMPHOCYTES # BLD AUTO: 12.1 % — LOW (ref 13–44)
MAGNESIUM SERPL-MCNC: 2.4 MG/DL — SIGNIFICANT CHANGE UP (ref 1.6–2.6)
MCHC RBC-ENTMCNC: 27.9 PG — SIGNIFICANT CHANGE UP (ref 27–34)
MCHC RBC-ENTMCNC: 31.3 GM/DL — LOW (ref 32–36)
MCV RBC AUTO: 89.3 FL — SIGNIFICANT CHANGE UP (ref 80–100)
MONOCYTES # BLD AUTO: 0.83 K/UL — SIGNIFICANT CHANGE UP (ref 0–0.9)
MONOCYTES NFR BLD AUTO: 8.6 % — SIGNIFICANT CHANGE UP (ref 2–14)
NEUTROPHILS # BLD AUTO: 7.35 K/UL — SIGNIFICANT CHANGE UP (ref 1.8–7.4)
NEUTROPHILS NFR BLD AUTO: 76.5 % — SIGNIFICANT CHANGE UP (ref 43–77)
NT-PROBNP SERPL-SCNC: 4976 PG/ML — HIGH (ref 0–450)
PLATELET # BLD AUTO: 253 K/UL — SIGNIFICANT CHANGE UP (ref 150–400)
POTASSIUM SERPL-MCNC: 4.7 MMOL/L — SIGNIFICANT CHANGE UP (ref 3.5–5.3)
POTASSIUM SERPL-SCNC: 4.7 MMOL/L — SIGNIFICANT CHANGE UP (ref 3.5–5.3)
PROT SERPL-MCNC: 6.8 GM/DL — SIGNIFICANT CHANGE UP (ref 6–8.3)
PROTHROM AB SERPL-ACNC: 64.1 SEC — HIGH (ref 10.5–13.4)
RAPID RVP RESULT: SIGNIFICANT CHANGE UP
RBC # BLD: 4.3 M/UL — SIGNIFICANT CHANGE UP (ref 4.2–5.8)
RBC # FLD: 14.8 % — HIGH (ref 10.3–14.5)
SARS-COV-2 RNA SPEC QL NAA+PROBE: SIGNIFICANT CHANGE UP
SODIUM SERPL-SCNC: 138 MMOL/L — SIGNIFICANT CHANGE UP (ref 135–145)
TROPONIN I, HIGH SENSITIVITY RESULT: 11.79 NG/L — SIGNIFICANT CHANGE UP
TROPONIN I, HIGH SENSITIVITY RESULT: 7.78 NG/L — SIGNIFICANT CHANGE UP
WBC # BLD: 9.63 K/UL — SIGNIFICANT CHANGE UP (ref 3.8–10.5)
WBC # FLD AUTO: 9.63 K/UL — SIGNIFICANT CHANGE UP (ref 3.8–10.5)

## 2022-07-14 PROCEDURE — 71045 X-RAY EXAM CHEST 1 VIEW: CPT

## 2022-07-14 PROCEDURE — 83735 ASSAY OF MAGNESIUM: CPT

## 2022-07-14 PROCEDURE — 85027 COMPLETE CBC AUTOMATED: CPT

## 2022-07-14 PROCEDURE — 84484 ASSAY OF TROPONIN QUANT: CPT

## 2022-07-14 PROCEDURE — 84443 ASSAY THYROID STIM HORMONE: CPT

## 2022-07-14 PROCEDURE — 99285 EMERGENCY DEPT VISIT HI MDM: CPT

## 2022-07-14 PROCEDURE — 86870 RBC ANTIBODY IDENTIFICATION: CPT

## 2022-07-14 PROCEDURE — 93320 DOPPLER ECHO COMPLETE: CPT

## 2022-07-14 PROCEDURE — 93005 ELECTROCARDIOGRAM TRACING: CPT

## 2022-07-14 PROCEDURE — 80048 BASIC METABOLIC PNL TOTAL CA: CPT

## 2022-07-14 PROCEDURE — U0003: CPT

## 2022-07-14 PROCEDURE — 83880 ASSAY OF NATRIURETIC PEPTIDE: CPT

## 2022-07-14 PROCEDURE — 94640 AIRWAY INHALATION TREATMENT: CPT

## 2022-07-14 PROCEDURE — 85610 PROTHROMBIN TIME: CPT

## 2022-07-14 PROCEDURE — 99223 1ST HOSP IP/OBS HIGH 75: CPT

## 2022-07-14 PROCEDURE — 93325 DOPPLER ECHO COLOR FLOW MAPG: CPT

## 2022-07-14 PROCEDURE — U0005: CPT

## 2022-07-14 PROCEDURE — 36415 COLL VENOUS BLD VENIPUNCTURE: CPT

## 2022-07-14 PROCEDURE — 93312 ECHO TRANSESOPHAGEAL: CPT

## 2022-07-14 PROCEDURE — 80053 COMPREHEN METABOLIC PANEL: CPT

## 2022-07-14 PROCEDURE — 93306 TTE W/DOPPLER COMPLETE: CPT

## 2022-07-14 PROCEDURE — 71045 X-RAY EXAM CHEST 1 VIEW: CPT | Mod: 26

## 2022-07-14 RX ORDER — FENOFIBRATE,MICRONIZED 130 MG
48 CAPSULE ORAL DAILY
Refills: 0 | Status: DISCONTINUED | OUTPATIENT
Start: 2022-07-14 | End: 2022-07-18

## 2022-07-14 RX ORDER — FLUCONAZOLE 150 MG/1
400 TABLET ORAL EVERY 24 HOURS
Refills: 0 | Status: COMPLETED | OUTPATIENT
Start: 2022-07-15 | End: 2022-07-17

## 2022-07-14 RX ORDER — ASPIRIN/CALCIUM CARB/MAGNESIUM 324 MG
81 TABLET ORAL DAILY
Refills: 0 | Status: DISCONTINUED | OUTPATIENT
Start: 2022-07-14 | End: 2022-07-18

## 2022-07-14 RX ORDER — ONDANSETRON 8 MG/1
4 TABLET, FILM COATED ORAL EVERY 8 HOURS
Refills: 0 | Status: DISCONTINUED | OUTPATIENT
Start: 2022-07-14 | End: 2022-07-18

## 2022-07-14 RX ORDER — BUDESONIDE AND FORMOTEROL FUMARATE DIHYDRATE 160; 4.5 UG/1; UG/1
2 AEROSOL RESPIRATORY (INHALATION)
Refills: 0 | Status: DISCONTINUED | OUTPATIENT
Start: 2022-07-14 | End: 2022-07-18

## 2022-07-14 RX ORDER — METOPROLOL TARTRATE 50 MG
37.5 TABLET ORAL
Refills: 0 | Status: DISCONTINUED | OUTPATIENT
Start: 2022-07-14 | End: 2022-07-16

## 2022-07-14 RX ORDER — SIMVASTATIN 20 MG/1
20 TABLET, FILM COATED ORAL AT BEDTIME
Refills: 0 | Status: DISCONTINUED | OUTPATIENT
Start: 2022-07-14 | End: 2022-07-18

## 2022-07-14 RX ORDER — FUROSEMIDE 40 MG
20 TABLET ORAL ONCE
Refills: 0 | Status: COMPLETED | OUTPATIENT
Start: 2022-07-14 | End: 2022-07-14

## 2022-07-14 RX ORDER — FLUCONAZOLE 150 MG/1
2 TABLET ORAL
Qty: 0 | Refills: 0 | DISCHARGE

## 2022-07-14 RX ORDER — FLUTICASONE PROPIONATE AND SALMETEROL 50; 250 UG/1; UG/1
1 POWDER ORAL; RESPIRATORY (INHALATION)
Qty: 0 | Refills: 0 | DISCHARGE

## 2022-07-14 RX ORDER — FUROSEMIDE 40 MG
20 TABLET ORAL
Refills: 0 | Status: DISCONTINUED | OUTPATIENT
Start: 2022-07-14 | End: 2022-07-16

## 2022-07-14 RX ORDER — ACETAMINOPHEN 500 MG
650 TABLET ORAL EVERY 6 HOURS
Refills: 0 | Status: DISCONTINUED | OUTPATIENT
Start: 2022-07-14 | End: 2022-07-18

## 2022-07-14 RX ORDER — TAMSULOSIN HYDROCHLORIDE 0.4 MG/1
0.4 CAPSULE ORAL AT BEDTIME
Refills: 0 | Status: DISCONTINUED | OUTPATIENT
Start: 2022-07-14 | End: 2022-07-18

## 2022-07-14 RX ORDER — LANOLIN ALCOHOL/MO/W.PET/CERES
3 CREAM (GRAM) TOPICAL AT BEDTIME
Refills: 0 | Status: DISCONTINUED | OUTPATIENT
Start: 2022-07-14 | End: 2022-07-18

## 2022-07-14 RX ADMIN — Medication 37.5 MILLIGRAM(S): at 19:27

## 2022-07-14 RX ADMIN — Medication 20 MILLIGRAM(S): at 19:27

## 2022-07-14 RX ADMIN — SIMVASTATIN 20 MILLIGRAM(S): 20 TABLET, FILM COATED ORAL at 22:30

## 2022-07-14 RX ADMIN — Medication 20 MILLIGRAM(S): at 14:39

## 2022-07-14 RX ADMIN — TAMSULOSIN HYDROCHLORIDE 0.4 MILLIGRAM(S): 0.4 CAPSULE ORAL at 22:29

## 2022-07-14 NOTE — H&P ADULT - HISTORY OF PRESENT ILLNESS
HPI:      PAST MEDICAL & SURGICAL HISTORY:  Atrial fibrillation      Mitral regurgitation      Hypertension, unspecified type      HLD (hyperlipidemia)      CAD (coronary artery disease)      S/P rotator cuff surgery      S/P CABG x 3          Review of Systems:   CONSTITUTIONAL: No fever.  EYES: No eye pain or discharge.  ENMT:  No sinus or throat pain  NECK: No pain or stiffness  RESPIRATORY: No cough, wheezing, chills or hemoptysis; No shortness of breath  CARDIOVASCULAR: No chest pain, palpitations, dizziness, or leg swelling  GASTROINTESTINAL: No abdominal or epigastric pain. No nausea, vomiting, or hematemesis; No diarrhea or constipation. No melena or hematochezia.  GENITOURINARY: No dysuria or incontinence  NEUROLOGICAL: No headaches, memory loss, loss of strength, numbness, or tremors  SKIN: No rashes.  LYMPH NODES: No enlarged glands  ENDOCRINE: No heat or cold intolerance; No hair loss  MUSCULOSKELETAL: No joint pain or swelling; No muscle, back, or extremity pain  PSYCHIATRIC: No depression, anxiety, mood swings, or difficulty sleeping  HEME/LYMPH: No easy bruising, or bleeding gums  ALLERY AND IMMUNOLOGIC: No hives or eczema    Allergies    No Known Allergies    Intolerances        Social History:     FAMILY HISTORY:      Home Medications:  Aspirin Enteric Coated 81 mg oral delayed release tablet: 1 tab(s) orally once a day (14 Jul 2022 12:12)  Dulera 200 mcg-5 mcg/inh inhalation aerosol: 2 puff(s) inhaled 2 times a day (14 Jul 2022 15:09)  fenofibrate 48 mg oral tablet: 1 tab(s) orally once a day (14 Jul 2022 15:09)  fluconazole 100 mg oral tablet: 2 tab(s) orally on day 1 followed by 1 tablet daily for 9 more days (14 Jul 2022 15:09)  ipratropium 42 mcg/inh (0.06%) nasal spray: 2 spray(s) nasal 2 times a day (14 Jul 2022 15:09)  metoprolol tartrate 25 mg oral tablet: 1.5 tab(s) orally 2 times a day (14 Jul 2022 12:12)  One A Day Men&#x27;s Complete oral tablet: 1 tab(s) orally once a day (14 Jul 2022 15:09)  simvastatin 20 mg oral tablet: 1 tab(s) orally once a day (at bedtime) (14 Jul 2022 12:12)  tamsulosin 0.4 mg oral capsule: 1 cap(s) orally once a day (14 Jul 2022 15:09)  Tylenol 325 mg oral tablet: 2 tab(s) orally every 4 hours, As Needed (14 Jul 2022 15:09)  warfarin 5 mg oral tablet: 1 tab(s) orally once a day (14 Jul 2022 12:12)      MEDICATIONS  (STANDING):  aspirin enteric coated 81 milliGRAM(s) Oral daily  budesonide 160 MICROgram(s)/formoterol 4.5 MICROgram(s) Inhaler 2 Puff(s) Inhalation two times a day  fenofibrate Tablet 48 milliGRAM(s) Oral daily  furosemide   Injectable 20 milliGRAM(s) IV Push two times a day  metoprolol tartrate 37.5 milliGRAM(s) Oral two times a day  simvastatin 20 milliGRAM(s) Oral at bedtime  tamsulosin 0.4 milliGRAM(s) Oral at bedtime    MEDICATIONS  (PRN):  acetaminophen     Tablet .. 650 milliGRAM(s) Oral every 6 hours PRN Temp greater or equal to 38C (100.4F), Mild Pain (1 - 3)  aluminum hydroxide/magnesium hydroxide/simethicone Suspension 30 milliLiter(s) Oral every 4 hours PRN Dyspepsia  melatonin 3 milliGRAM(s) Oral at bedtime PRN Insomnia  ondansetron Injectable 4 milliGRAM(s) IV Push every 8 hours PRN Nausea and/or Vomiting      CAPILLARY BLOOD GLUCOSE        I&O's Summary      PHYSICAL EXAM:  Vital Signs Last 24 Hrs  T(C): 36.5 (14 Jul 2022 15:18), Max: 36.5 (14 Jul 2022 10:43)  T(F): 97.7 (14 Jul 2022 15:18), Max: 97.7 (14 Jul 2022 10:43)  HR: 105 (14 Jul 2022 15:18) (105 - 105)  BP: 157/79 (14 Jul 2022 15:18) (141/97 - 157/79)  BP(mean): 100 (14 Jul 2022 15:18) (100 - 110)  RR: 20 (14 Jul 2022 15:18) (18 - 20)  SpO2: 97% (14 Jul 2022 15:18) (94% - 97%)    Parameters below as of 14 Jul 2022 15:18  Patient On (Oxygen Delivery Method): room air      GENERAL: NAD, well-developed  HEAD:  Atraumatic, Normocephalic  EYES: EOMI, PERRLA, conjunctiva and sclera clear  ENT: normal hearing, no nasal discharge, throat clear, dentition normal  NECK: Supple, No JVD, no LAD, no thyromegaly   CHEST/LUNG: Clear to auscultation bilaterally; No wheeze, respirations unlabored  HEART: Regular rate and rhythm; No murmurs, rubs, or gallops  ABDOMEN: Soft, Nontender, Nondistended; Bowel sounds present, no HSM  EXTREMITIES:  2+ Peripheral Pulses, No clubbing, cyanosis, or edema  PSYCH: AAOx3, normal behavior  NEUROLOGY: non-focal, sensory and cn 2-12 intact, speech/language intact  SKIN: No visible rashes or lesions    LABS:                        12.0   9.63  )-----------( 253      ( 14 Jul 2022 11:34 )             38.4     07-14    138  |  107  |  21  ----------------------------<  95  4.7   |  29  |  1.26    Ca    9.2      14 Jul 2022 11:34  Mg     2.4     07-14    TPro  6.8  /  Alb  3.4  /  TBili  0.7  /  DBili  x   /  AST  18  /  ALT  25  /  AlkPhos  52  07-14    PT/INR - ( 14 Jul 2022 11:34 )   PT: 64.1 sec;   INR: 5.43 ratio         PTT - ( 14 Jul 2022 11:34 )  PTT:66.8 sec          RADIOLOGY & ADDITIONAL TESTS:    Imaging Personally Reviewed:    EKG Personally Reviewed:    Consultant(s) Notes Reviewed:      Care Discussed with Consultants/Other Providers:   HPI:  77M w/PMH Afib, PPM, HTN, CAD/CABG, asthma, h/o vocal cord ca, colon ca s/p resection, presents c/o LUU w/ walking short distance, unable to walk in the park over the past week associated w/ palpitations and "heart racing", denies cp.  He's recently been diagnosed w/ asthma so he's been using his INH more often w/out relief. He's been unable to sleep as he can't breath, he's been sleeping sitting up.  He denies f/c, cough, n/v/d, abd pain, dysuria.  +mild leg swelling. Pt currently on diflucan for thrush as prescribed by his ENT and needs 3 more days for the course.     In ED, -140's, elevated bp, bnp 5K, INR 5.4, trop neg, Ekg afib, cxr w/ pulm congestion, appears to be chf    PAST MEDICAL & SURGICAL HISTORY:  colon cancer  Atrial fibrillation  Mitral regurgitation  Hypertension, unspecified type  HLD (hyperlipidemia)  Asthma   Vocal Cord cancer- 2016  CAD (coronary artery disease)  S/P rotator cuff surgery  S/P CABG x 3  s/p PPM  s/p MVR  s/p colon resection Feb 2022    Review of Systems:   CONSTITUTIONAL: No fever.  EYES: No eye pain or discharge.  ENMT:  No sinus or throat pain  NECK: No pain or stiffness  RESPIRATORY: No cough, wheezing, chills or hemoptysis; ++ shortness of breath  CARDIOVASCULAR:see hpi   GASTROINTESTINAL: No abdominal or epigastric pain. No nausea, vomiting, or hematemesis; No diarrhea or constipation. No melena or hematochezia.  GENITOURINARY: No dysuria or incontinence  NEUROLOGICAL: No headaches, memory loss, loss of strength, numbness, or tremors  SKIN: No rashes.  MUSCULOSKELETAL: No joint pain or swelling; No muscle, back, or extremity pain  PSYCHIATRIC: No depression, anxiety, mood swings, or difficulty sleeping    Social History:   lives w/ wife  Quit smoking 40 yrs ago  occ etoh    FAMILY HISTORY:  unknown to pt     MEDICATIONS  (STANDING):  aspirin enteric coated 81 milliGRAM(s) Oral daily  budesonide 160 MICROgram(s)/formoterol 4.5 MICROgram(s) Inhaler 2 Puff(s) Inhalation two times a day  fenofibrate Tablet 48 milliGRAM(s) Oral daily  furosemide   Injectable 20 milliGRAM(s) IV Push two times a day  metoprolol tartrate 37.5 milliGRAM(s) Oral two times a day  simvastatin 20 milliGRAM(s) Oral at bedtime  tamsulosin 0.4 milliGRAM(s) Oral at bedtime    MEDICATIONS  (PRN):  acetaminophen     Tablet .. 650 milliGRAM(s) Oral every 6 hours PRN Temp greater or equal to 38C (100.4F), Mild Pain (1 - 3)  aluminum hydroxide/magnesium hydroxide/simethicone Suspension 30 milliLiter(s) Oral every 4 hours PRN Dyspepsia  melatonin 3 milliGRAM(s) Oral at bedtime PRN Insomnia  ondansetron Injectable 4 milliGRAM(s) IV Push every 8 hours PRN Nausea and/or Vomiting    PHYSICAL EXAM:  Vital Signs Last 24 Hrs  T(C): 36.5 (14 Jul 2022 15:18), Max: 36.5 (14 Jul 2022 10:43)  T(F): 97.7 (14 Jul 2022 15:18), Max: 97.7 (14 Jul 2022 10:43)  HR: 105 (14 Jul 2022 15:18) (105 - 105)  BP: 157/79 (14 Jul 2022 15:18) (141/97 - 157/79)  BP(mean): 100 (14 Jul 2022 15:18) (100 - 110)  RR: 20 (14 Jul 2022 15:18) (18 - 20)  SpO2: 97% (14 Jul 2022 15:18) (94% - 97%)  Parameters below as of 14 Jul 2022 15:18  Patient On (Oxygen Delivery Method): room air  GENERAL: NAD,   HEAD:  Atraumatic, Normocephalic  EYES: EOMI, PERRLA, conjunctiva and sclera clear  ENT: normal hearing, no nasal discharge, throat clear, dentition normal  NECK: Supple, No JVD, no LAD, no thyromegaly   CHEST/LUNG: ++ Dec BS at bases bilaterally; No wheeze, respirations unlabored  HEART: irreg irreg, tachy, +BOB  ABDOMEN: Soft, Nontender, Nondistended; Bowel sounds present, no HSM  EXTREMITIES:  2+ Peripheral Pulses, No clubbing, cyanosis, +2 pedal edema  PSYCH: AAOx3, normal behavior  NEUROLOGY: non-focal, sensory and cn 2-12 intact, speech/language intact  SKIN: No visible rashes or lesions    LABS:                        12.0   9.63  )-----------( 253      ( 14 Jul 2022 11:34 )             38.4     07-14    138  |  107  |  21  ----------------------------<  95  4.7   |  29  |  1.26    Ca    9.2      14 Jul 2022 11:34  Mg     2.4     07-14    TPro  6.8  /  Alb  3.4  /  TBili  0.7  /  DBili  x   /  AST  18  /  ALT  25  /  AlkPhos  52  07-14    PT/INR - ( 14 Jul 2022 11:34 )   PT: 64.1 sec;   INR: 5.43 ratio         PTT - ( 14 Jul 2022 11:34 )  PTT:66.8 sec          RADIOLOGY & ADDITIONAL TESTS:    Imaging Personally Reviewed:  cxr b/l chf, pulm congestion  EKG Personally Reviewed:  afib   Consultant(s) Notes Reviewed:    n/a  Care Discussed with Consultants/Other Providers:  ED MD

## 2022-07-14 NOTE — H&P ADULT - ASSESSMENT
77M w/PMH Afib, PPM, HTN, CAD/CABG, asthma, h/o vocal cord ca, colon ca s/p resection, presents c/o LUU w/ walking short distance, unable to walk in the park over the past week associated w/ palpitations and "heart racing", denies cp.  He's recently been diagnosed w/ asthma so he's been using his INH more often w/out relief. He's been unable to sleep as he can't breath, he's been sleeping sitting up.  He denies f/c, cough, n/v/d, abd pain, dysuria.  +mild leg swelling.     In ED, -140's, elevated bp, bnp 5K, INR 5.4, trop neg, Ekg afib, cxr w/ pulm congestion, appears to be chf 77M w/PMH Afib, PPM, HTN, CAD/CABG, asthma, h/o vocal cord ca, colon ca s/p resection, presents c/o LUU w/ walking short distance,+mild leg swelling.   In ED, -140's, elevated bp, bnp 5K, INR 5.4, trop neg, Ekg afib, cxr w/ pulm congestion, appears to be chf    #CHF- new onset  - admit to tele  - start IV lasix  - check TTE  - daily wts, strict i/o's, restrict fluid/sodium  - cardio cs    #supratherapeutic INR  #Afib  #PPM  - pt has been on diflucan for thrush- likely interacting  - hold coumadin today  - check INR, goal 2-3  - EP cs - interrogate and evaluate afib  - TSH    #PPX- on coumadin

## 2022-07-14 NOTE — ED ADULT NURSE NOTE - NSSEPSISSUSPECTED_ED_A_ED
Pt arrives to dept with complaints of fever and sore throat. Pt states that last night she had a fever of 103.2 and her throat is swollen tonsils with white spots.    No

## 2022-07-14 NOTE — ED PROVIDER NOTE - CARDIAC, MLM
Normal rate. Heart sounds S1, S2.  No murmurs, rubs or gallops. Irregular regular rhythm. Normal rate. Heart sounds S1, S2.  No murmurs, rubs or gallops. Irregular irregular rhythm.

## 2022-07-14 NOTE — ED PROVIDER NOTE - NORMAL, MLM
BPH (benign prostatic hyperplasia)    DM (diabetes mellitus)    Emphysema/COPD    GERD (gastroesophageal reflux disease)    HTN (hypertension)
tong all pertinent systems normal

## 2022-07-14 NOTE — ED PROVIDER NOTE - PROGRESS NOTE DETAILS
Lianne AHN: Dr. Finley to admit; dnm at this time; lasix ordered; patient agreeable with plan; cardiology consult for Dr. Laguna's service placed.

## 2022-07-14 NOTE — ED ADULT NURSE NOTE - NSIMPLEMENTINTERV_GEN_ALL_ED
Implemented All Fall Risk Interventions:  [place] to call system. Call bell, personal items and telephone within reach. Instruct patient to call for assistance. Room bathroom lighting operational. Non-slip footwear when patient is off stretcher. Physically safe environment: no spills, clutter or unnecessary equipment. Stretcher in lowest position, wheels locked, appropriate side rails in place. Provide visual cue, wrist band, yellow gown, etc. Monitor gait and stability. Monitor for mental status changes and reorient to person, place, and time. Review medications for side effects contributing to fall risk. Reinforce activity limits and safety measures with patient and family.

## 2022-07-14 NOTE — ED PROVIDER NOTE - OBJECTIVE STATEMENT
78 y/o male presents to the ED c/o 1 week intermittent CP, SOB walking short distance. Family at bedside states pt BP has been inc, HR high didn't see his  for this problem, and is here at the ED now. No fever, no chills, no cough. Pt has pacemaker. 76 y/o male presents to the ED c/o 1 week intermittent CP, SOB walking short distance; unable to walk short distances without sob; per family- increasing HR over last week and increasing blood pressure; brought to ED for further evaluation; No fever, no chills, no cough. Pt has pacemaker.

## 2022-07-14 NOTE — ED ADULT NURSE NOTE - OBJECTIVE STATEMENT
78 y/o alert male presents to the ED with c/o of SOB. Pt states SOB x1week, suffers from sleep apnea but does not utilize a CPAP machine. Vs are WNL, EKG performed at bedside, cardiac monitor, cardiac rhythm is Afib, PMH of pacemaker, Afib and sleep apnea. SP02 on RA is 100% at rest.

## 2022-07-15 LAB
ALBUMIN SERPL ELPH-MCNC: 3.5 G/DL — SIGNIFICANT CHANGE UP (ref 3.3–5)
ALP SERPL-CCNC: 57 U/L — SIGNIFICANT CHANGE UP (ref 40–120)
ALT FLD-CCNC: 26 U/L — SIGNIFICANT CHANGE UP (ref 12–78)
ANION GAP SERPL CALC-SCNC: 6 MMOL/L — SIGNIFICANT CHANGE UP (ref 5–17)
AST SERPL-CCNC: 19 U/L — SIGNIFICANT CHANGE UP (ref 15–37)
BILIRUB SERPL-MCNC: 1 MG/DL — SIGNIFICANT CHANGE UP (ref 0.2–1.2)
BUN SERPL-MCNC: 23 MG/DL — SIGNIFICANT CHANGE UP (ref 7–23)
CALCIUM SERPL-MCNC: 9.5 MG/DL — SIGNIFICANT CHANGE UP (ref 8.5–10.1)
CHLORIDE SERPL-SCNC: 101 MMOL/L — SIGNIFICANT CHANGE UP (ref 96–108)
CO2 SERPL-SCNC: 29 MMOL/L — SIGNIFICANT CHANGE UP (ref 22–31)
CREAT SERPL-MCNC: 1.28 MG/DL — SIGNIFICANT CHANGE UP (ref 0.5–1.3)
EGFR: 57 ML/MIN/1.73M2 — LOW
GLUCOSE SERPL-MCNC: 87 MG/DL — SIGNIFICANT CHANGE UP (ref 70–99)
HCT VFR BLD CALC: 40.1 % — SIGNIFICANT CHANGE UP (ref 39–50)
HGB BLD-MCNC: 12.7 G/DL — LOW (ref 13–17)
INR BLD: 5 RATIO — CRITICAL HIGH (ref 0.88–1.16)
MCHC RBC-ENTMCNC: 28.3 PG — SIGNIFICANT CHANGE UP (ref 27–34)
MCHC RBC-ENTMCNC: 31.7 GM/DL — LOW (ref 32–36)
MCV RBC AUTO: 89.5 FL — SIGNIFICANT CHANGE UP (ref 80–100)
PLATELET # BLD AUTO: 264 K/UL — SIGNIFICANT CHANGE UP (ref 150–400)
POTASSIUM SERPL-MCNC: 4.1 MMOL/L — SIGNIFICANT CHANGE UP (ref 3.5–5.3)
POTASSIUM SERPL-SCNC: 4.1 MMOL/L — SIGNIFICANT CHANGE UP (ref 3.5–5.3)
PROT SERPL-MCNC: 7.2 GM/DL — SIGNIFICANT CHANGE UP (ref 6–8.3)
PROTHROM AB SERPL-ACNC: 58.9 SEC — HIGH (ref 10.5–13.4)
RBC # BLD: 4.48 M/UL — SIGNIFICANT CHANGE UP (ref 4.2–5.8)
RBC # FLD: 14.8 % — HIGH (ref 10.3–14.5)
SODIUM SERPL-SCNC: 136 MMOL/L — SIGNIFICANT CHANGE UP (ref 135–145)
TSH SERPL-MCNC: 1.21 UU/ML — SIGNIFICANT CHANGE UP (ref 0.34–4.82)
WBC # BLD: 9.06 K/UL — SIGNIFICANT CHANGE UP (ref 3.8–10.5)
WBC # FLD AUTO: 9.06 K/UL — SIGNIFICANT CHANGE UP (ref 3.8–10.5)

## 2022-07-15 PROCEDURE — 99233 SBSQ HOSP IP/OBS HIGH 50: CPT

## 2022-07-15 PROCEDURE — 93306 TTE W/DOPPLER COMPLETE: CPT | Mod: 26

## 2022-07-15 RX ADMIN — Medication 20 MILLIGRAM(S): at 16:38

## 2022-07-15 RX ADMIN — BUDESONIDE AND FORMOTEROL FUMARATE DIHYDRATE 2 PUFF(S): 160; 4.5 AEROSOL RESPIRATORY (INHALATION) at 16:24

## 2022-07-15 RX ADMIN — Medication 37.5 MILLIGRAM(S): at 06:26

## 2022-07-15 RX ADMIN — SIMVASTATIN 20 MILLIGRAM(S): 20 TABLET, FILM COATED ORAL at 22:30

## 2022-07-15 RX ADMIN — Medication 20 MILLIGRAM(S): at 06:26

## 2022-07-15 RX ADMIN — TAMSULOSIN HYDROCHLORIDE 0.4 MILLIGRAM(S): 0.4 CAPSULE ORAL at 22:30

## 2022-07-15 RX ADMIN — Medication 48 MILLIGRAM(S): at 11:11

## 2022-07-15 RX ADMIN — FLUCONAZOLE 400 MILLIGRAM(S): 150 TABLET ORAL at 11:11

## 2022-07-15 RX ADMIN — BUDESONIDE AND FORMOTEROL FUMARATE DIHYDRATE 2 PUFF(S): 160; 4.5 AEROSOL RESPIRATORY (INHALATION) at 20:09

## 2022-07-15 RX ADMIN — Medication 81 MILLIGRAM(S): at 11:11

## 2022-07-15 RX ADMIN — Medication 37.5 MILLIGRAM(S): at 17:16

## 2022-07-15 NOTE — DIETITIAN INITIAL EVALUATION ADULT - NS FNS DIET ORDER
Diet, DASH/TLC:   Sodium & Cholesterol Restricted  1500mL Fluid Restriction ([id number])     Special Instructions for Nursin milliLiter(s) to 2000 milliLiter(s) fluid restriction (22 @ 15:51)

## 2022-07-15 NOTE — DIETITIAN INITIAL EVALUATION ADULT - ORAL INTAKE PTA/DIET HISTORY
Pt lives at home with wife that does all the cooking; pt reports that they "sometimes" use table salt, but will use herbs/spice in their preparation of food. Pt admits that he drinks a lot of fluids at home. Pt reports appetite is good & PO intake is ~ 80% of ENN.   Pt lives at home with wife that does all the cooking; pt reports that they "sometimes" use table salt, but will mostly use herbs/spices while cooking. Pt admits that he drinks a lot of fluids at home. Pt reports appetite is good & PO intake is ~ 80% of ENN PTA.  Pt lives at home with wife who does cooking; pt reports that they "sometimes" use table salt, but will mostly use herbs/spices while cooking. Pt admits that he drinks a lot of fluids at home. Pt reports appetite is good & PO intake is ~ 80% of ENN PTA.

## 2022-07-15 NOTE — DIETITIAN INITIAL EVALUATION ADULT - NAME AND PHONE
Priyanka Bowles M.S., Dietitian Eligible [phone]   Priyanka Bowles M.S., Dietitian Eligible [phone]  Niesha Cole MS, RDN, [facility] [phone]  Nutrition

## 2022-07-15 NOTE — DIETITIAN INITIAL EVALUATION ADULT - OTHER INFO
77M w/PMH Afib, PPM, HTN, CAD/CABG, asthma, h/o vocal cord ca, colon ca s/p resection, presents c/o LUU w/ walking short distance, unable to walk in the park over the past week associated w/ palpitations and "heart racing", denies cp. He denies f/c, cough, n/v/d, abd pain, dysuria.  +mild leg swelling. Pt currently on diflucan for thrush as prescribed by his ENT and needs 3 more days for the course.     Pt reports that appetite is still good since admit and PO intake is ~80% of ENN. Pt reports UBW at 250# x 5 years. Unable to obtain bedscale wt 2/2 pt in chair. Actual standing wt is 252# taken on 7/15/22 as per flowsheet. No significant wt gain or wt losses at this time - mild leg swelling noted as per H&P; could be masking any losses. NFPE reveals no muscle/fat wasting at this time - pt appears obese. Continue w/ DASH/TLC diet and 1500mL FR; gave pt verbal and written CHF education - pt was receptive. See other recommendations below.

## 2022-07-15 NOTE — PATIENT PROFILE ADULT - FALL HARM RISK - HARM RISK INTERVENTIONS

## 2022-07-15 NOTE — DIETITIAN INITIAL EVALUATION ADULT - SIGNS/SYMPTOMS
overconsumption of foods/liquids non-compliant w/ recommended diet  AEB, obese BMI of 38, excessive energy intake & noncompliance w/ recommended diet  AEB BMI 38, excessive energy intake & noncompliance w/ recommended diet

## 2022-07-15 NOTE — PROGRESS NOTE ADULT - SUBJECTIVE AND OBJECTIVE BOX
CC: Acute on chronic systolic congestive heart failure     (15 Jul 2022 08:43)    HPI: 77M w/PMH Afib, PPM, HTN, CAD/CABG, asthma, h/o vocal cord ca, colon ca s/p resection, presents c/o LUU w/ walking short distance, unable to walk in the park over the past week associated w/ palpitations and "heart racing", denies cp.  He's recently been diagnosed w/ asthma so he's been using his INH more often w/out relief. He's been unable to sleep as he can't breath, he's been sleeping sitting up.  He denies f/c, cough, n/v/d, abd pain, dysuria.  +mild leg swelling. Pt currently on diflucan for thrush as prescribed by his ENT and needs 3 more days for the course.     In ED, -140's, elevated bp, bnp 5K, INR 5.4, trop neg, Ekg afib, cxr w/ pulm congestion, appears to be chf    PAST MEDICAL & SURGICAL HISTORY:  colon cancer  Atrial fibrillation  Mitral regurgitation  Hypertension, unspecified type  HLD (hyperlipidemia)  Asthma   Vocal Cord cancer- 2016  CAD (coronary artery disease)  S/P rotator cuff surgery  S/P CABG x 3  s/p PPM  s/p MVR  s/p colon resection Feb 2022    Review of Systems:   CONSTITUTIONAL: No fever.  EYES: No eye pain or discharge.  ENMT:  No sinus or throat pain  NECK: No pain or stiffness  RESPIRATORY: No cough, wheezing, chills or hemoptysis; ++ shortness of breath  CARDIOVASCULAR:see hpi   GASTROINTESTINAL: No abdominal or epigastric pain. No nausea, vomiting, or hematemesis; No diarrhea or constipation. No melena or hematochezia.  GENITOURINARY: No dysuria or incontinence  NEUROLOGICAL: No headaches, memory loss, loss of strength, numbness, or tremors  SKIN: No rashes.  MUSCULOSKELETAL: No joint pain or swelling; No muscle, back, or extremity pain  PSYCHIATRIC: No depression, anxiety, mood swings, or difficulty sleeping    Social History:   lives w/ wife  Quit smoking 40 yrs ago  occ etoh    FAMILY HISTORY:  unknown to pt     MEDICATIONS  (STANDING):  aspirin enteric coated 81 milliGRAM(s) Oral daily  budesonide 160 MICROgram(s)/formoterol 4.5 MICROgram(s) Inhaler 2 Puff(s) Inhalation two times a day  fenofibrate Tablet 48 milliGRAM(s) Oral daily  furosemide   Injectable 20 milliGRAM(s) IV Push two times a day  metoprolol tartrate 37.5 milliGRAM(s) Oral two times a day  simvastatin 20 milliGRAM(s) Oral at bedtime  tamsulosin 0.4 milliGRAM(s) Oral at bedtime    MEDICATIONS  (PRN):  acetaminophen     Tablet .. 650 milliGRAM(s) Oral every 6 hours PRN Temp greater or equal to 38C (100.4F), Mild Pain (1 - 3)  aluminum hydroxide/magnesium hydroxide/simethicone Suspension 30 milliLiter(s) Oral every 4 hours PRN Dyspepsia  melatonin 3 milliGRAM(s) Oral at bedtime PRN Insomnia  ondansetron Injectable 4 milliGRAM(s) IV Push every 8 hours PRN Nausea and/or Vomiting    PHYSICAL EXAM:  Vital Signs Last 24 Hrs  T(C): 36.5 (14 Jul 2022 15:18), Max: 36.5 (14 Jul 2022 10:43)  T(F): 97.7 (14 Jul 2022 15:18), Max: 97.7 (14 Jul 2022 10:43)  HR: 105 (14 Jul 2022 15:18) (105 - 105)  BP: 157/79 (14 Jul 2022 15:18) (141/97 - 157/79)  BP(mean): 100 (14 Jul 2022 15:18) (100 - 110)  RR: 20 (14 Jul 2022 15:18) (18 - 20)  SpO2: 97% (14 Jul 2022 15:18) (94% - 97%)  Parameters below as of 14 Jul 2022 15:18  Patient On (Oxygen Delivery Method): room air  GENERAL: NAD,   HEAD:  Atraumatic, Normocephalic  EYES: EOMI, PERRLA, conjunctiva and sclera clear  ENT: normal hearing, no nasal discharge, throat clear, dentition normal  NECK: Supple, No JVD, no LAD, no thyromegaly   CHEST/LUNG: ++ Dec BS at bases bilaterally; No wheeze, respirations unlabored  HEART: irreg irreg, tachy, +BOB  ABDOMEN: Soft, Nontender, Nondistended; Bowel sounds present, no HSM  EXTREMITIES:  2+ Peripheral Pulses, No clubbing, cyanosis, +2 pedal edema  PSYCH: AAOx3, normal behavior  NEUROLOGY: non-focal, sensory and cn 2-12 intact, speech/language intact  SKIN: No visible rashes or lesions    LABS:                        12.0   9.63  )-----------( 253      ( 14 Jul 2022 11:34 )             38.4     07-14    138  |  107  |  21  ----------------------------<  95  4.7   |  29  |  1.26    Ca    9.2      14 Jul 2022 11:34  Mg     2.4     07-14    TPro  6.8  /  Alb  3.4  /  TBili  0.7  /  DBili  x   /  AST  18  /  ALT  25  /  AlkPhos  52  07-14    PT/INR - ( 14 Jul 2022 11:34 )   PT: 64.1 sec;   INR: 5.43 ratio         PTT - ( 14 Jul 2022 11:34 )  PTT:66.8 sec          RADIOLOGY & ADDITIONAL TESTS:    Imaging Personally Reviewed:  cxr b/l chf, pulm congestion  EKG Personally Reviewed:  afib   Consultant(s) Notes Reviewed:    n/a  Care Discussed with Consultants/Other Providers:  ED MD (14 Jul 2022 15:54)    INTERVAL HPI/OVERNIGHT EVENTS:    Vital Signs Last 24 Hrs  T(C): 36.7 (15 Jul 2022 09:01), Max: 36.7 (14 Jul 2022 18:15)  T(F): 98.1 (15 Jul 2022 09:01), Max: 98.1 (14 Jul 2022 18:15)  HR: 89 (15 Jul 2022 06:07) (85 - 105)  BP: 123/78 (15 Jul 2022 09:01) (123/78 - 157/79)  BP(mean): 91 (14 Jul 2022 20:50) (91 - 105)  RR: 18 (15 Jul 2022 09:01) (18 - 20)  SpO2: 99% (15 Jul 2022 09:01) (96% - 99%)    Parameters below as of 15 Jul 2022 09:01  Patient On (Oxygen Delivery Method): room air      I&O's Detail    REVIEW OF SYSTEMS:    CONSTITUTIONAL: No weakness, fevers or chills  EYES/ENT: No visual changes;  No vertigo or throat pain   NECK: No pain or stiffness  RESPIRATORY: No cough, wheezing, hemoptysis; No shortness of breath  CARDIOVASCULAR: No chest pain or palpitations  GASTROINTESTINAL: No abdominal or epigastric pain. No nausea, vomiting, or hematemesis; No diarrhea or constipation. No melena or hematochezia.  GENITOURINARY: No dysuria, frequency or hematuria  NEUROLOGICAL: No numbness or weakness  SKIN: No itching, burning, rashes, or lesions   All other review of systems is negative unless indicated above.  PHYSICAL EXAM:    General: Well developed; well nourished; in no acute distress  Eyes: PERRLA, EOMI; conjunctiva and sclera clear  Head: Normocephalic; atraumatic  ENMT: No nasal discharge; airway clear  Neck: Supple; non tender; no masses  Respiratory: No wheezes, rales or rhonchi  Cardiovascular: Regular rate and rhythm. S1 and S2 Normal; No murmurs, gallops or rubs  Gastrointestinal: Soft non-tender non-distended; Normal bowel sounds  Genitourinary: No  suprapubic  tenderness  Extremities: Normal range of motion, No clubbing, cyanosis or edema  Vascular: Peripheral pulses palpable 2+ bilaterally  Neurological: Alert and oriented x4  Skin: Warm and dry. No acute rash  Lymph Nodes: No acute cervical adenopathy  Musculoskeletal: Normal muscle tone, without deformities  Psychiatric: Cooperative and appropriate                            12.7   9.06  )-----------( 264      ( 15 Jul 2022 08:10 )             40.1     15 Jul 2022 08:10    136    |  101    |  23     ----------------------------<  87     4.1     |  29     |  1.28     Ca    9.5        15 Jul 2022 08:10  Mg     2.4       15 Jul 2022 08:10    TPro  7.2    /  Alb  3.5    /  TBili  1.0    /  DBili  x      /  AST  19     /  ALT  26     /  AlkPhos  57     15 Jul 2022 08:10    PT/INR - ( 15 Jul 2022 08:10 )   PT: 58.9 sec;   INR: 5.00 ratio         PTT - ( 14 Jul 2022 11:34 )  PTT:66.8 sec  CAPILLARY BLOOD GLUCOSE        LIVER FUNCTIONS - ( 15 Jul 2022 08:10 )  Alb: 3.5 g/dL / Pro: 7.2 gm/dL / ALK PHOS: 57 U/L / ALT: 26 U/L / AST: 19 U/L / GGT: x               MEDICATIONS  (STANDING):  aspirin enteric coated 81 milliGRAM(s) Oral daily  budesonide 160 MICROgram(s)/formoterol 4.5 MICROgram(s) Inhaler 2 Puff(s) Inhalation two times a day  fenofibrate Tablet 48 milliGRAM(s) Oral daily  fluconAZOLE  Oral Tab/Cap - Peds 400 milliGRAM(s) Oral every 24 hours  furosemide   Injectable 20 milliGRAM(s) IV Push two times a day  metoprolol tartrate 37.5 milliGRAM(s) Oral two times a day  simvastatin 20 milliGRAM(s) Oral at bedtime  tamsulosin 0.4 milliGRAM(s) Oral at bedtime    MEDICATIONS  (PRN):  acetaminophen     Tablet .. 650 milliGRAM(s) Oral every 6 hours PRN Temp greater or equal to 38C (100.4F), Mild Pain (1 - 3)  aluminum hydroxide/magnesium hydroxide/simethicone Suspension 30 milliLiter(s) Oral every 4 hours PRN Dyspepsia  melatonin 3 milliGRAM(s) Oral at bedtime PRN Insomnia  ondansetron Injectable 4 milliGRAM(s) IV Push every 8 hours PRN Nausea and/or Vomiting      RADIOLOGY & ADDITIONAL TESTS: CC: Acute on chronic systolic congestive heart failure    HPI: 77M w/PMH Afib, s/p  PPM, HTN, CAD s/p CABG, asthma,  vocal cord ca, colon ca s/p resection, HLD,  presented to ED  c/o LUU w/ walking short distance, unable to walk in the park over the past week associated w/ palpitations and "heart racing", denies cp.  past year  was diagnosed w/ asthma so he's been using his INH more often w/out relief. He's been unable to sleep as he can't breath, he's been sleeping sitting up.  He denies f/c, cough, n/v/d, abd pain, dysuria.  +mild leg swelling. Pt currently on diflucan for thrush as prescribed by his ENT and needs 3 more days for the course.     In ED, -140's, elevated bp, bnp 5K, INR 5.4, trop neg, Ekg afib, cxr w/ pulm congestion, appears to be chf    INTERVAL HPI/OVERNIGHT EVENTS: chart reviewed, Pt was seen and examined, confirms history above, reports feels little better today, no palpitations or CP. Has good UOP. Pt denies H/o CHF but as per wife was very swollen after colon Sx. Wife at bedside, results and POC discussed     Vital Signs Last 24 Hrs  T(C): 36.7 (15 Jul 2022 09:01), Max: 36.7 (14 Jul 2022 18:15)  T(F): 98.1 (15 Jul 2022 09:01), Max: 98.1 (14 Jul 2022 18:15)  HR: 89 (15 Jul 2022 06:07) (85 - 105)  BP: 123/78 (15 Jul 2022 09:01) (123/78 - 157/79)  BP(mean): 91 (14 Jul 2022 20:50) (91 - 105)  RR: 18 (15 Jul 2022 09:01) (18 - 20)  SpO2: 99% (15 Jul 2022 09:01) (96% - 99%)    Parameters below as of 15 Jul 2022 09:01  Patient On (Oxygen Delivery Method): room air      REVIEW OF SYSTEMS:  All other review of systems is negative unless indicated above.  PHYSICAL EXAM:    General: Well developed; in no acute distress  Eyes:  EOMI; conjunctiva and sclera clear  Head: Normocephalic; atraumatic  ENMT: No nasal discharge; airway clear  Neck: Supple; non tender; no masses  Respiratory: Decreased BS, No wheezes, rales or rhonchi  Cardiovascular: Irregular rate and rhythm. S1 and S2 Normal;  Gastrointestinal: Soft non-tender non-distended; Normal bowel sounds  Genitourinary: No  suprapubic  tenderness  Extremities: +2 LE edema  Vascular: Peripheral pulses palpable 2+ bilaterally  Neurological: Alert and oriented x4  Skin: Warm and dry. No acute rash  Lymph Nodes: No acute cervical adenopathy  Musculoskeletal: Normal muscle tone, without deformities  Psychiatric: Cooperative and appropriate    LABS:                         12.7   9.06  )-----------( 264      ( 15 Jul 2022 08:10 )             40.1     15 Jul 2022 08:10    136    |  101    |  23     ----------------------------<  87     4.1     |  29     |  1.28     Ca    9.5        15 Jul 2022 08:10  Mg     2.4       15 Jul 2022 08:10    TPro  7.2    /  Alb  3.5    /  TBili  1.0    /  DBili  x      /  AST  19     /  ALT  26     /  AlkPhos  57     15 Jul 2022 08:10    PT/INR - ( 15 Jul 2022 08:10 )   PT: 58.9 sec;   INR: 5.00 ratio    PTT - ( 14 Jul 2022 11:34 )  PTT:66.8 sec    LIVER FUNCTIONS - ( 15 Jul 2022 08:10 )  Alb: 3.5 g/dL / Pro: 7.2 gm/dL / ALK PHOS: 57 U/L / ALT: 26 U/L / AST: 19 U/L / GGT: x               MEDICATIONS  (STANDING):  aspirin enteric coated 81 milliGRAM(s) Oral daily  budesonide 160 MICROgram(s)/formoterol 4.5 MICROgram(s) Inhaler 2 Puff(s) Inhalation two times a day  fenofibrate Tablet 48 milliGRAM(s) Oral daily  fluconAZOLE  Oral Tab/Cap - Peds 400 milliGRAM(s) Oral every 24 hours  furosemide   Injectable 20 milliGRAM(s) IV Push two times a day  metoprolol tartrate 37.5 milliGRAM(s) Oral two times a day  simvastatin 20 milliGRAM(s) Oral at bedtime  tamsulosin 0.4 milliGRAM(s) Oral at bedtime    MEDICATIONS  (PRN):  acetaminophen     Tablet .. 650 milliGRAM(s) Oral every 6 hours PRN Temp greater or equal to 38C (100.4F), Mild Pain (1 - 3)  aluminum hydroxide/magnesium hydroxide/simethicone Suspension 30 milliLiter(s) Oral every 4 hours PRN Dyspepsia  melatonin 3 milliGRAM(s) Oral at bedtime PRN Insomnia  ondansetron Injectable 4 milliGRAM(s) IV Push every 8 hours PRN Nausea and/or Vomiting      RADIOLOGY & ADDITIONAL TESTS:  < from: Xray Chest 1 View- PORTABLE-Urgent (Xray Chest 1 View- PORTABLE-Urgent .) (07.14.22 @ 11:33) >    ACC: [id number] EXAM:  XR CHEST PORTABLE URGENT 1V                          PROCEDURE DATE:  07/14/2022          INTERPRETATION:  AP erect chest on July 14, 2022 at 11:24 AM. Patient has   chest pain.    Heart likely enlarged. Sternotomy and left-sided pacemaker again noted.    Present film shows interstitial CHF bilaterally which is new since August 17, 2013. CHF is also new since March 11 of this year.    IMPRESSION: Bilateral CHF at this time.

## 2022-07-15 NOTE — DIETITIAN INITIAL EVALUATION ADULT - ADD RECOMMEND
1) Continue DASH/TLC diet & 1500mL fluid restriction at this time 2/2 CHF  2) MVI w/ minerals daily to ensure 100% RDA met  3) Monitor bowel movements, if no BM for >3 days, consider implementing bowel regimen.  4) Obtain vitamin D 25OH level to assess nutriture & A1C to assess blood sugar levels  5) Provide ongoing education on diet recommendation to increase adherence   RD will continue to monitor PO intake, labs, hydration, and wt prn.  1) Continue DASH/TLC diet & 1500mL fluid restriction at this time 2/2 CHF  2) MVI w/ minerals daily to ensure 100% RDA met  3) Monitor bowel movements, if no BM for >3 days, consider implementing bowel regimen.  4) Obtain vitamin D 25OH level to assess nutriture   5) Provide ongoing education on diet recommendation to increase adherence. Encourage f/u w/ outpatient RD for further nutrition education/ counseling  RD will continue to monitor PO intake, labs, hydration, and wt prn.

## 2022-07-15 NOTE — PROGRESS NOTE ADULT - ASSESSMENT
77M w/PMH Afib, PPM, HTN, CAD/CABG, asthma, h/o vocal cord ca, colon ca s/p resection, presents c/o LUU w/ walking short distance,+mild leg swelling.   In ED, -140's, elevated bp, bnp 5K, INR 5.4, trop neg, Ekg afib, cxr w/ pulm congestion, appears to be chf    #CHF- new onset  - admit to tele  - start IV lasix  - check TTE  - daily wts, strict i/o's, restrict fluid/sodium  - cardio cs    #supratherapeutic INR  #Afib  #PPM  - pt has been on diflucan for thrush- likely interacting  - hold coumadin today  - check INR, goal 2-3  - EP cs - interrogate and evaluate afib  - TSH    #PPX- on coumadin 77M w/PMH Afib, PPM, HTN, CAD/CABG, asthma, h/o vocal cord ca, colon ca s/p resection admitted for:       #CHF- new onset, unknown EF   - Monitor on tele: AFIB HR up to 130s   - C/w  IV lasix  - ECHO pending report   - daily wts, strict i/o's, restrict fluid/sodium  - nutrition eval   - D/w DR Laguna will evaluate     # Chronic AFIB. S/p PPM. Supratherapeutic  INR  - pt has been on diflucan for thrush- likely interacting  - C/w Metroprolol   - hold coumadin tonight   - INR start trending down   - check INR, goal 2-3  - EP cs - interrogate and evaluate afib  - TSH wnl     # HTN   Monitor BP  C/w Metoprolol    # CAD, s/p CABG  No CP  Trop neg   F/u ECHO  C/w ASA, Metoprolol, Statin     # Asthma  Stable  C/w Symbicort  Albuterol PRN       # Oral thrush   On fluconazole       Dispo; IV diuresis, CArdio and EP eval. C/w tele. Labs in am

## 2022-07-15 NOTE — CONSULT NOTE ADULT - SUBJECTIVE AND OBJECTIVE BOX
77-year-old male presents with complaints of progressive shortness of breath over the last few days.  Also has complaints of worsening edema of the lower extremity.  Notices his heartbeat is racing.  He has history of atrial fibrillation.  Denies noncompliance to medications.  Recently he has had dietary indiscretion and has been having salty food, including Chinese and takeout food daily for the last week.      PAST MEDICAL & SURGICAL HISTORY:  Atrial fibrillation  Mitral regurgitation s/p mitral valve replacement  Hypertension, unspecified type  HLD (hyperlipidemia)  CAD (coronary artery disease)  S/P CABG x 3, MVR  s/p PPM  S/P rotator cuff surgery  Colon CA. s/p colon resection Feb 2022  Asthma   Vocal Cord cancer- 2016        PREVIOUS CARDIAC WORKUP:      < from: TTE Echo Complete w/o Contrast w/ Doppler (07.15.22 @ 10:06) >   S/p mitral valve repair. MV mean pressure gradient is 7 mmHg.   No mitral regurgitation.   Mildly calcified aortic valve.   Trance aortic insufficiency.   Moderate to severe tricuspid valve regurgitation.   Moderate pulmonary hypertension.   The left ventricle is normal in size, wall motion and contractility.   Mild symmetric left ventricular hypertrophy is present.   Estimated left ventricular ejection fraction is 50-55%.   A device wire is seen in the RV and RA.      Stress Test:  Feb 2002    ·	Rest myocardial perfusion gated SPECT imaging was performed, showing a left ventricular ejection fraction of 62 %,  ·	Post stress resting myocardial perfusion gated SPECT imaging was performed, showing a left ventricular ejection fraction of 67 %,  ·	There is a small size defect, in the apical inferior and apical wall(s), that is fixed, suggestive with diaphragmatic attenuation artifact and soft tissue attenuation.  ·	Rest gated analysis showed normal left ventricular function with normal left ventricle size  ·	Post stress analysis showed normal left ventricular function with normal left ventricle size  ·	Gated wall motion analysis shows normal wall motion.  ·	Overall impression: Normal.  ·	Left ventricular perfusion is probably normal.  ·	No ECG evidence of ischemia after administration of IV regadenoson.  ·	SPECT results are limited by artifact and show no definite ischemia or infarct.        ALLERGIES:    No Known Allergies       MEDICATIONS  (STANDING):  aspirin enteric coated 81 milliGRAM(s) Oral daily  budesonide 160 MICROgram(s)/formoterol 4.5 MICROgram(s) Inhaler 2 Puff(s) Inhalation two times a day  fenofibrate Tablet 48 milliGRAM(s) Oral daily  fluconAZOLE  Oral Tab/Cap - Peds 400 milliGRAM(s) Oral every 24 hours  furosemide   Injectable 20 milliGRAM(s) IV Push two times a day  metoprolol tartrate 37.5 milliGRAM(s) Oral two times a day  simvastatin 20 milliGRAM(s) Oral at bedtime  tamsulosin 0.4 milliGRAM(s) Oral at bedtime    MEDICATIONS  (PRN):  acetaminophen     Tablet .. 650 milliGRAM(s) Oral every 6 hours PRN Temp greater or equal to 38C (100.4F), Mild Pain (1 - 3)  aluminum hydroxide/magnesium hydroxide/simethicone Suspension 30 milliLiter(s) Oral every 4 hours PRN Dyspepsia  melatonin 3 milliGRAM(s) Oral at bedtime PRN Insomnia  ondansetron Injectable 4 milliGRAM(s) IV Push every 8 hours PRN Nausea and/or Vomiting      FAMILY HISTORY:  NC        SOCIAL HISTORY:  Ex smoker. No ETOH abuse. No illicit drugs.     ROS:     Detailed ten system ROS was performed and was negative except for history as eluded to above.    no fever  no chills  no nausea  no vomiting  no diarrhea  no constipation  no melena  no hematochezia  no chest pain  no palpitations  no sob at rest  + dyspnea on exertion  no cough  no wheezing  no anorexia  no headache  no dizziness  no syncope  no weakness  no myalgia  no dysuria  no polyuria  no hematuria     Vital Signs Last 24 Hrs  T(C): 36.7 (15 Jul 2022 09:01), Max: 36.7 (14 Jul 2022 18:15)  T(F): 98.1 (15 Jul 2022 09:01), Max: 98.1 (14 Jul 2022 18:15)  HR: 89 (15 Jul 2022 06:07) (85 - 105)  BP: 123/78 (15 Jul 2022 09:01) (123/78 - 157/79)  BP(mean): 91 (14 Jul 2022 20:50) (91 - 105)  RR: 18 (15 Jul 2022 09:01) (18 - 20)  SpO2: 99% (15 Jul 2022 09:01) (96% - 99%): room air        PHYSICAL EXAM:    General:                Comfortable, AAO X 3, in no distress. Obese  HEENT:                  Atraumatic, PERRLA, EOMI, conjunctiva clear.   Neck:                     Supple, no adenopathy, no thyromegaly, no JVD, no bruit.  Chest:                   B/L symmetric reduced basal air entry, rales  Heart:                     S1, S2 irregular, + murmur.  Abdomen:              Soft, non-tender, bowel sounds active. No palpable masses.  Extremities:           no cyanosis, + edema. Peripheral pulses normal.  Skin:                      Skin color, texture normal. No rashes.  Neurologic:            Grossly nonfocal.       TELEMETRY:  Rapid AF    ECG:   Rapid AF    LABS:                        12.7   9.06  )-----------( 264      ( 15 Jul 2022 08:10 )             40.1     15 Jul 2022 08:10    136    |  101    |  23     ----------------------------<  87     4.1     |  29     |  1.28     Ca    9.5        15 Jul 2022 08:10  Mg     2.4       15 Jul 2022 08:10      TPro  7.2    /  Alb  3.5    /  TBili  1.0    /  DBili  x      /  AST  19     /  ALT  26     /  AlkPhos  57     15 Jul 2022 08:10    PT/INR - ( 15 Jul 2022 08:10 )   PT: 58.9 sec;   INR: 5.00 ratio    PTT - ( 14 Jul 2022 11:34 )  PTT:66.8 sec    07-14 @ 17:30  Trop-I  7.78    07-14 @ 11:34  Trop-I  11.79      Pro BNP  [id number] 07-14 @ 11:34      RADIOLOGY & ADDITIONAL STUDIES:    < from: Xray Chest 1 View- PORTABLE-Urgent (Xray Chest 1 View- PORTABLE-Urgent .) (07.14.22 @ 11:33) >  IMPRESSION: Bilateral CHF at this time.

## 2022-07-15 NOTE — DIETITIAN INITIAL EVALUATION ADULT - PERTINENT MEDS FT
MEDICATIONS  (STANDING):  aspirin enteric coated 81 milliGRAM(s) Oral daily  budesonide 160 MICROgram(s)/formoterol 4.5 MICROgram(s) Inhaler 2 Puff(s) Inhalation two times a day  fenofibrate Tablet 48 milliGRAM(s) Oral daily  fluconAZOLE  Oral Tab/Cap - Peds 400 milliGRAM(s) Oral every 24 hours  furosemide   Injectable 20 milliGRAM(s) IV Push two times a day  metoprolol tartrate 37.5 milliGRAM(s) Oral two times a day  simvastatin 20 milliGRAM(s) Oral at bedtime  tamsulosin 0.4 milliGRAM(s) Oral at bedtime    MEDICATIONS  (PRN):  acetaminophen     Tablet .. 650 milliGRAM(s) Oral every 6 hours PRN Temp greater or equal to 38C (100.4F), Mild Pain (1 - 3)  aluminum hydroxide/magnesium hydroxide/simethicone Suspension 30 milliLiter(s) Oral every 4 hours PRN Dyspepsia  melatonin 3 milliGRAM(s) Oral at bedtime PRN Insomnia  ondansetron Injectable 4 milliGRAM(s) IV Push every 8 hours PRN Nausea and/or Vomiting

## 2022-07-15 NOTE — DIETITIAN INITIAL EVALUATION ADULT - PERTINENT LABORATORY DATA
07-14    138  |  107  |  21  ----------------------------<  95  4.7   |  29  |  1.26    Ca    9.2      14 Jul 2022 11:34  Mg     2.4     07-14    TPro  6.8  /  Alb  3.4  /  TBili  0.7  /  DBili  x   /  AST  18  /  ALT  25  /  AlkPhos  52  07-14   07-14    138  |  107  |  21  ----------------------------<  95  4.7   |  29  |  1.26    Ca    9.2      14 Jul 2022 11:34  Mg     2.4     07-14    TPro  6.8  /  Alb  3.4  /  TBili  0.7  /  DBili  x   /  AST  18  /  ALT  25  /  AlkPhos  52  07-14    INR: 5.3 (H); PT: 64.1 (H) - dysfunction in blood clotting

## 2022-07-15 NOTE — DIETITIAN INITIAL EVALUATION ADULT - LAB (SPECIFY)
Obtain vitamin D 25OH level to assess nutriture  Obtain A1C level to assess blood sugar levels  Obtain vitamin D 25OH level to assess nutriture

## 2022-07-16 LAB
ANION GAP SERPL CALC-SCNC: 5 MMOL/L — SIGNIFICANT CHANGE UP (ref 5–17)
BUN SERPL-MCNC: 28 MG/DL — HIGH (ref 7–23)
CALCIUM SERPL-MCNC: 9.2 MG/DL — SIGNIFICANT CHANGE UP (ref 8.5–10.1)
CHLORIDE SERPL-SCNC: 102 MMOL/L — SIGNIFICANT CHANGE UP (ref 96–108)
CO2 SERPL-SCNC: 31 MMOL/L — SIGNIFICANT CHANGE UP (ref 22–31)
CREAT SERPL-MCNC: 1.44 MG/DL — HIGH (ref 0.5–1.3)
EGFR: 49 ML/MIN/1.73M2 — LOW
GLUCOSE SERPL-MCNC: 102 MG/DL — HIGH (ref 70–99)
HCT VFR BLD CALC: 41.1 % — SIGNIFICANT CHANGE UP (ref 39–50)
HGB BLD-MCNC: 12.8 G/DL — LOW (ref 13–17)
INR BLD: 5.26 RATIO — CRITICAL HIGH (ref 0.88–1.16)
MCHC RBC-ENTMCNC: 27.9 PG — SIGNIFICANT CHANGE UP (ref 27–34)
MCHC RBC-ENTMCNC: 31.1 GM/DL — LOW (ref 32–36)
MCV RBC AUTO: 89.5 FL — SIGNIFICANT CHANGE UP (ref 80–100)
PLATELET # BLD AUTO: 271 K/UL — SIGNIFICANT CHANGE UP (ref 150–400)
POTASSIUM SERPL-MCNC: 3.7 MMOL/L — SIGNIFICANT CHANGE UP (ref 3.5–5.3)
POTASSIUM SERPL-SCNC: 3.7 MMOL/L — SIGNIFICANT CHANGE UP (ref 3.5–5.3)
PROTHROM AB SERPL-ACNC: 62 SEC — HIGH (ref 10.5–13.4)
RBC # BLD: 4.59 M/UL — SIGNIFICANT CHANGE UP (ref 4.2–5.8)
RBC # FLD: 14.7 % — HIGH (ref 10.3–14.5)
SODIUM SERPL-SCNC: 138 MMOL/L — SIGNIFICANT CHANGE UP (ref 135–145)
WBC # BLD: 9.18 K/UL — SIGNIFICANT CHANGE UP (ref 3.8–10.5)
WBC # FLD AUTO: 9.18 K/UL — SIGNIFICANT CHANGE UP (ref 3.8–10.5)

## 2022-07-16 PROCEDURE — 99223 1ST HOSP IP/OBS HIGH 75: CPT

## 2022-07-16 PROCEDURE — 99233 SBSQ HOSP IP/OBS HIGH 50: CPT

## 2022-07-16 RX ORDER — METOPROLOL TARTRATE 50 MG
50 TABLET ORAL
Refills: 0 | Status: DISCONTINUED | OUTPATIENT
Start: 2022-07-16 | End: 2022-07-18

## 2022-07-16 RX ADMIN — Medication 20 MILLIGRAM(S): at 06:23

## 2022-07-16 RX ADMIN — SIMVASTATIN 20 MILLIGRAM(S): 20 TABLET, FILM COATED ORAL at 21:12

## 2022-07-16 RX ADMIN — Medication 37.5 MILLIGRAM(S): at 17:17

## 2022-07-16 RX ADMIN — Medication 48 MILLIGRAM(S): at 11:12

## 2022-07-16 RX ADMIN — Medication 37.5 MILLIGRAM(S): at 06:23

## 2022-07-16 RX ADMIN — Medication 20 MILLIGRAM(S): at 17:23

## 2022-07-16 RX ADMIN — FLUCONAZOLE 400 MILLIGRAM(S): 150 TABLET ORAL at 11:12

## 2022-07-16 RX ADMIN — Medication 50 MILLIGRAM(S): at 18:35

## 2022-07-16 RX ADMIN — TAMSULOSIN HYDROCHLORIDE 0.4 MILLIGRAM(S): 0.4 CAPSULE ORAL at 21:11

## 2022-07-16 RX ADMIN — Medication 81 MILLIGRAM(S): at 11:12

## 2022-07-16 RX ADMIN — BUDESONIDE AND FORMOTEROL FUMARATE DIHYDRATE 2 PUFF(S): 160; 4.5 AEROSOL RESPIRATORY (INHALATION) at 21:19

## 2022-07-16 NOTE — PROGRESS NOTE ADULT - ASSESSMENT
77M w/PMH Afib, PPM, HTN, CAD/CABG, asthma, h/o vocal cord ca, colon ca s/p resection admitted for:       #CHF- new onset, unknown EF   - Monitor on tele: AFIB HR up to 130s   - C/w  IV lasix  - ECHO pending report   - daily wts, strict i/o's, restrict fluid/sodium  - nutrition eval   - D/w DR Laguna will evaluate     # Chronic AFIB. S/p PPM. Supratherapeutic  INR  - pt has been on diflucan for thrush- likely interacting  - C/w Metroprolol   - hold coumadin tonight   - INR start trending down   - check INR, goal 2-3  - EP cs - interrogate and evaluate afib  - TSH wnl     # HTN   Monitor BP  C/w Metoprolol    # CAD, s/p CABG  No CP  Trop neg   F/u ECHO  C/w ASA, Metoprolol, Statin     # Asthma  Stable  C/w Symbicort  Albuterol PRN       # Oral thrush   On fluconazole       Dispo; IV diuresis, CArdio and EP eval. C/w tele. Labs in am    77M w/PMH Afib, PPM, HTN, CAD/CABG, asthma, h/o vocal cord ca, colon ca s/p resection admitted for:       # acute HFmrEF new onset, likely due to AFIB with RVR    - Monitor on tele: AFIB HR up to 130-140s with exertion    -On   IV lasix, will hold further IV diuresis as BIN/CR went up, reeval in am   - Control HR, BB increased   - Hold off on ACEI until renal  Fx stable   - ECHO: EF 50-55%, MVR, mod-severe TR, mod Pulm HTN   - daily wts, strict i/o's, restrict fluid/sodium  - nutrition eval   - D/w DR García     # Chronic AFIB. S/p PPM. Supratherapeutic  INR  - pt has been on diflucan for thrush- likely interacting  - C/w Metroprolol   - hold coumadin tonight   - check INR daily, goal 2-3  - EP cs - interrogate and evaluate afib  - TSH wnl       # HTN   Monitor BP  C/w Metoprolol    # CAD, s/p CABG  No CP  Trop neg   EF 50-55%, no RWVA  C/w ASA, Metoprolol, Statin     # Asthma  Stable  C/w Symbicort  Albuterol PRN       # Oral thrush   On fluconazole , last dose tomorrow       Dispo; IV diuresis, CArdio and EP f/u C/w tele. Labs in am

## 2022-07-16 NOTE — CONSULT NOTE ADULT - SUBJECTIVE AND OBJECTIVE BOX
Patient is a 76 yo man seen for SOB/AF with RVR.  He has prior history of persistent AF and s/p prior cardioversions with early recurrences. He s/p  PPM for tachy - cristhian. He has history of HTN, CAD/CABG, asthma, h/o vocal cord ca, colon ca s/p resection.  Over the last week he has had increasing LUU w/ walking short distance> He denied chest pain. He felt increase in HR. Also noted ankle swelling.  He also had PND/ orthopnea. He's recently been diagnosed w/ asthma so he's been using his INH more often w/out relief. In ED, -140's, elevated bp, bnp 5K, INR 5.4, trop neg, Ekg afib, cxr w/ pulm congestion> His symptoms improved significantly in hospital with rate control and diuretic. Patient is now feeling well and not aware of the AF,    Pacemaker Interrogation:  [employer] Dual chamber  Programmed [id number]  Rate   AV delay 180/150  PAST MEDICAL & SURGICAL HISTORY:  colon cancer  Atrial fibrillation  Mitral regurgitation  Hypertension, unspecified type  HLD (hyperlipidemia)  Asthma   Vocal Cord cancer- 2016  CAD (coronary artery disease)  S/P rotator cuff surgery  S/P CABG x 3  s/p PPM  s/p MVR  s/p colon resection Feb 2022    Review of Systems: as above.    CONSTITUTIONAL: No fever.  ENMT:  No sinus or throat pain  RESPIRATORY: No cough, wheezing, chills or hemoptysis;   CARDIOVASCULAR:see hpi   GASTROINTESTINAL: No abdominal or epigastric pain. No nausea, vomiting, or hematemesis;   NEUROLOGICAL: No weakness  Social History:   lives w/ wife  Quit smoking 40 yrs ago  occ etoh    FAMILY HISTORY:  unknown to pt     MEDICATIONS  (STANDING):  aspirin enteric coated 81 milliGRAM(s) Oral daily  budesonide 160 MICROgram(s)/formoterol 4.5 MICROgram(s) Inhaler 2 Puff(s) Inhalation two times a day  fenofibrate Tablet 48 milliGRAM(s) Oral daily  furosemide   Injectable 20 milliGRAM(s) IV Push two times a day  metoprolol tartrate 37.5 milliGRAM(s) Oral two times a day  simvastatin 20 milliGRAM(s) Oral at bedtime  tamsulosin 0.4 milliGRAM(s) Oral at bedtime    MEDICATIONS  (PRN):  acetaminophen     Tablet .. 650 milliGRAM(s) Oral every 6 hours PRN Temp greater or equal to 38C (100.4F), Mild Pain (1 - 3)  aluminum hydroxide/magnesium hydroxide/simethicone Suspension 30 milliLiter(s) Oral every 4 hours PRN Dyspepsia  melatonin 3 milliGRAM(s) Oral at bedtime PRN Insomnia  ondansetron Injectable 4 milliGRAM(s) IV Push every 8 hours PRN Nausea and/or Vomiting    PHYSICAL EXAM:  Vital Signs Last 24 Hrs  T(C): 36.5 (14 Jul 2022 15:18), Max: 36.5 (14 Jul 2022 10:43)  T(F): 97.7 (14 Jul 2022 15:18), Max: 97.7 (14 Jul 2022 10:43)  HR: 105 (14 Jul 2022 15:18) (105 - 105)  BP: 157/79 (14 Jul 2022 15:18) (141/97 - 157/79)  BP(mean): 100 (14 Jul 2022 15:18) (100 - 110)  RR: 20 (14 Jul 2022 15:18) (18 - 20)  SpO2: 97% (14 Jul 2022 15:18) (94% - 97%)  Parameters below as of 14 Jul 2022 15:18  Patient On (Oxygen Delivery Method): room air  GENERAL: NAD,   NECK: No JVD, no thyromegaly   CHEST/LUNG: no rales or wheezing  HEART: irreg irreg, tachy, distant HDS  ABDOMEN: obese, soft NT  EXTREMITIES: no edema  NEUROLOGY: non-focal    LABS:                        12.0   9.63  )-----------( 253      ( 14 Jul 2022 11:34 )             38.4     07-14    138  |  107  |  21  ----------------------------<  95  4.7   |  29  |  1.26    Ca    9.2      14 Jul 2022 11:34  Mg     2.4     07-14    TPro  6.8  /  Alb  3.4  /  TBili  0.7  /  DBili  x   /  AST  18  /  ALT  25  /  AlkPhos  52  07-14    PT/INR - ( 14 Jul 2022 11:34 )   PT: 64.1 sec;   INR: 5.43 ratio         PTT - ( 14 Jul 2022 11:34 )  PTT:66.8 sec          RADIOLOGY & ADDITIONAL TESTS:        PAST MEDICAL & SURGICAL HISTORY:  Atrial fibrillation      Mitral regurgitation      Hypertension, unspecified type      HLD (hyperlipidemia)      CAD (coronary artery disease)      S/P rotator cuff surgery      S/P CABG x 3          PREVIOUS DIAGNOSTIC TESTING:      ECHO  FINDINGS:    STRESS  FINDINGS:    CATHETERIZATION  FINDINGS:      Allergies    No Known Allergies    Intolerances    NTERPRETATION OF TELEMETRY: AF with occasional V pacing

## 2022-07-16 NOTE — CONSULT NOTE ADULT - ASSESSMENT
Acute on chronic diastolic CHF  Rapid AF  Supra therapeutic INR  Hypertension  CAD (coronary artery disease).  S/P CABG x 3, MVR  s/p PPM      Suggest:    Observe patient on telemetry for AF rate control.  Low sodium, low cholesterol, ADA diet.  Fluid restriction 1.5 lit/day  monitor Intake & output  Daily weights.  Follow up electrolytes, renal function.   Echo shows near normal EF  CE negative - no ACS  Treat with Diuretics  Continue beta blockers. Advance dose as needed for HR control  Trial of ACEi/ARBs  Hold coumadin for high INR. Resume when INR 2 - 3  D/w wife at bedside  D/W Dr Brooke Moore   
Patient with prior paroxysmal AF, HTN, CABG, s/p Mitral valve repair  He presented with AF with increase VR and Pulmonary congestion  His troponin was negative  Echo showed EF 50-55%.  Pacemaker interrogation showed onset of persistent AF over the last month., He has had increased VR over the same period. Normal RV pacing parameters and remaining battery longevity 1.5 yrs. He is ventricular paced < 2%  JEN 24cc/m2    Recommend anticoagulation - he is currently on ASA  Would recommend maintenance of SR and would consider AAD and repeat OSCAR/cardioversion. Can consider AF ablation in the future  Discussed the options with patient and his family at bedside and he will d/w his cardiologist and if agree we could start amiodarone after OSCAR.

## 2022-07-16 NOTE — PROGRESS NOTE ADULT - SUBJECTIVE AND OBJECTIVE BOX
CC: Acute on chronic systolic congestive heart failure    HPI: 77M w/PMH Afib, s/p  PPM, HTN, CAD s/p CABG, asthma,  vocal cord ca, colon ca s/p resection, HLD,  presented to ED  c/o LUU w/ walking short distance, unable to walk in the park over the past week associated w/ palpitations and "heart racing", denies cp.  past year  was diagnosed w/ asthma so he's been using his INH more often w/out relief. He's been unable to sleep as he can't breath, he's been sleeping sitting up.  He denies f/c, cough, n/v/d, abd pain, dysuria.  +mild leg swelling. Pt currently on diflucan for thrush as prescribed by his ENT and needs 3 more days for the course.     In ED, -140's, elevated bp, bnp 5K, INR 5.4, trop neg, Ekg afib, cxr w/ pulm congestion, appears to be chf    INTERVAL HPI/OVERNIGHT EVENTS: chart reviewed, Pt was seen and examined, confirms history above, reports feels little better today, no palpitations or CP. Has good UOP. Pt denies H/o CHF but as per wife was very swollen after colon Sx. Wife at bedside, results and POC discussed     Vital Signs Last 24 Hrs  T(C): 36.4 (16 Jul 2022 08:27), Max: 36.8 (16 Jul 2022 05:23)  T(F): 97.5 (16 Jul 2022 08:27), Max: 98.3 (16 Jul 2022 05:23)  HR: 80 (16 Jul 2022 08:27) (80 - 90)  BP: 115/71 (16 Jul 2022 08:27) (111/58 - 126/84)  BP(mean): 96 (15 Jul 2022 16:30) (96 - 96)  RR: 18 (16 Jul 2022 08:27) (18 - 19)  SpO2: 98% (16 Jul 2022 08:27) (94% - 98%)    Parameters below as of 16 Jul 2022 08:27  Patient On (Oxygen Delivery Method): room air    )    Parameters below as of 15 Jul 2022 09:01  Patient On (Oxygen Delivery Method): room air      REVIEW OF SYSTEMS:  All other review of systems is negative unless indicated above.  PHYSICAL EXAM:    General: Well developed; in no acute distress  Eyes:  EOMI; conjunctiva and sclera clear  Head: Normocephalic; atraumatic  ENMT: No nasal discharge; airway clear  Neck: Supple; non tender; no masses  Respiratory: Decreased BS, No wheezes, rales or rhonchi  Cardiovascular: Irregular rate and rhythm. S1 and S2 Normal;  Gastrointestinal: Soft non-tender non-distended; Normal bowel sounds  Genitourinary: No  suprapubic  tenderness  Extremities: +2 LE edema  Vascular: Peripheral pulses palpable 2+ bilaterally  Neurological: Alert and oriented x4  Skin: Warm and dry. No acute rash  Lymph Nodes: No acute cervical adenopathy  Musculoskeletal: Normal muscle tone, without deformities  Psychiatric: Cooperative and appropriate    LABS:                         12.7   9.06  )-----------( 264      ( 15 Jul 2022 08:10 )             40.1     15 Jul 2022 08:10    136    |  101    |  23     ----------------------------<  87     4.1     |  29     |  1.28     Ca    9.5        15 Jul 2022 08:10  Mg     2.4       15 Jul 2022 08:10    TPro  7.2    /  Alb  3.5    /  TBili  1.0    /  DBili  x      /  AST  19     /  ALT  26     /  AlkPhos  57     15 Jul 2022 08:10    PT/INR - ( 15 Jul 2022 08:10 )   PT: 58.9 sec;   INR: 5.00 ratio    PTT - ( 14 Jul 2022 11:34 )  PTT:66.8 sec    LIVER FUNCTIONS - ( 15 Jul 2022 08:10 )  Alb: 3.5 g/dL / Pro: 7.2 gm/dL / ALK PHOS: 57 U/L / ALT: 26 U/L / AST: 19 U/L / GGT: x               MEDICATIONS  (STANDING):  aspirin enteric coated 81 milliGRAM(s) Oral daily  budesonide 160 MICROgram(s)/formoterol 4.5 MICROgram(s) Inhaler 2 Puff(s) Inhalation two times a day  fenofibrate Tablet 48 milliGRAM(s) Oral daily  fluconAZOLE  Oral Tab/Cap - Peds 400 milliGRAM(s) Oral every 24 hours  furosemide   Injectable 20 milliGRAM(s) IV Push two times a day  metoprolol tartrate 37.5 milliGRAM(s) Oral two times a day  simvastatin 20 milliGRAM(s) Oral at bedtime  tamsulosin 0.4 milliGRAM(s) Oral at bedtime    MEDICATIONS  (PRN):  acetaminophen     Tablet .. 650 milliGRAM(s) Oral every 6 hours PRN Temp greater or equal to 38C (100.4F), Mild Pain (1 - 3)  aluminum hydroxide/magnesium hydroxide/simethicone Suspension 30 milliLiter(s) Oral every 4 hours PRN Dyspepsia  melatonin 3 milliGRAM(s) Oral at bedtime PRN Insomnia  ondansetron Injectable 4 milliGRAM(s) IV Push every 8 hours PRN Nausea and/or Vomiting      RADIOLOGY & ADDITIONAL TESTS:  < from: Xray Chest 1 View- PORTABLE-Urgent (Xray Chest 1 View- PORTABLE-Urgent .) (07.14.22 @ 11:33) >    ACC: [id number] EXAM:  XR CHEST PORTABLE URGENT 1V                          PROCEDURE DATE:  07/14/2022          INTERPRETATION:  AP erect chest on July 14, 2022 at 11:24 AM. Patient has   chest pain.    Heart likely enlarged. Sternotomy and left-sided pacemaker again noted.    Present film shows interstitial CHF bilaterally which is new since August 17, 2013. CHF is also new since March 11 of this year.    IMPRESSION: Bilateral CHF at this time.   CC: Acute on chronic systolic congestive heart failure    HPI: 77M w/PMH Afib, s/p  PPM, HTN, CAD s/p CABG, asthma,  vocal cord ca, colon ca s/p resection, HLD,  presented to ED  c/o LUU w/ walking short distance, unable to walk in the park over the past week associated w/ palpitations and "heart racing", denies cp.  past year  was diagnosed w/ asthma so he's been using his INH more often w/out relief. He's been unable to sleep as he can't breath, he's been sleeping sitting up.  He denies f/c, cough, n/v/d, abd pain, dysuria.  +mild leg swelling. Pt currently on diflucan for thrush as prescribed by his ENT and needs 3 more days for the course.     In ED, -140's, elevated bp, bnp 5K, INR 5.4, trop neg, Ekg afib, cxr w/ pulm congestion, appears to be chf    INTERVAL HPI/ OVERNIGHT EVENTS:  Pt was seen and examined,  reports SOB is better at rest and ambulation, leg swelling improving.  Pt is tachy  on monitor   Plan discussed.      Vital Signs Last 24 Hrs  T(C): 36.4 (16 Jul 2022 08:27), Max: 36.8 (16 Jul 2022 05:23)  T(F): 97.5 (16 Jul 2022 08:27), Max: 98.3 (16 Jul 2022 05:23)  HR: 80 (16 Jul 2022 08:27) (80 - 90)  BP: 115/71 (16 Jul 2022 08:27) (111/58 - 126/84)  BP(mean): 96 (15 Jul 2022 16:30) (96 - 96)  RR: 18 (16 Jul 2022 08:27) (18 - 19)  SpO2: 98% (16 Jul 2022 08:27) (94% - 98%)    Parameters below as of 16 Jul 2022 08:27  Patient On (Oxygen Delivery Method): room air    REVIEW OF SYSTEMS:  All other review of systems is negative unless indicated above.        PHYSICAL EXAM:  General: Well developed; in no acute distress  Eyes:  EOMI; conjunctiva and sclera clear  Head: Normocephalic; atraumatic  ENMT: No nasal discharge; airway clear  Neck: Supple; non tender; no masses  Respiratory: Decreased BS, No wheezes, rales or rhonchi  Cardiovascular: Irregular rate and rhythm. S1 and S2 Normal;  Gastrointestinal: Soft non-tender non-distended; Normal bowel sounds  Genitourinary: No  suprapubic  tenderness  Extremities: +2 LE edema, slightly improved   Vascular: Peripheral pulses palpable 2+ bilaterally  Neurological: Alert and oriented x3, non focal   Skin: Warm and dry. No acute rash  Lymph Nodes: No acute cervical adenopathy  Musculoskeletal: Normal muscle tone, without deformities  Psychiatric: Cooperative and appropriate    LABS:                         12.8   9.18  )-----------( 271      ( 16 Jul 2022 06:31 )             41.1     07-16    138  |  102  |  28<H>  ----------------------------<  102<H>  3.7   |  31  |  1.44<H>    Ca    9.2      16 Jul 2022 06:31  Mg     2.3     07-16    TPro  7.2  /  Alb  3.5  /  TBili  1.0  /  DBili  x   /  AST  19  /  ALT  26  /  AlkPhos  57  07-15    LIVER FUNCTIONS - ( 15 Jul 2022 08:10 )  Alb: 3.5 g/dL / Pro: 7.2 gm/dL / ALK PHOS: 57 U/L / ALT: 26 U/L / AST: 19 U/L / GGT: x           PT/INR - ( 16 Jul 2022 06:31 )   PT: 62.0 sec;   INR: 5.26 ratio                              12.7   9.06  )-----------( 264      ( 15 Jul 2022 08:10 )             40.1     15 Jul 2022 08:10    136    |  101    |  23     ----------------------------<  87     4.1     |  29     |  1.28     Ca    9.5        15 Jul 2022 08:10  Mg     2.4       15 Jul 2022 08:10    TPro  7.2    /  Alb  3.5    /  TBili  1.0    /  DBili  x      /  AST  19     /  ALT  26     /  AlkPhos  57     15 Jul 2022 08:10    PT/INR - ( 15 Jul 2022 08:10 )   PT: 58.9 sec;   INR: 5.00 ratio    PTT - ( 14 Jul 2022 11:34 )  PTT:66.8 sec    LIVER FUNCTIONS - ( 15 Jul 2022 08:10 )  Alb: 3.5 g/dL / Pro: 7.2 gm/dL / ALK PHOS: 57 U/L / ALT: 26 U/L / AST: 19 U/L / GGT: x             MEDICATIONS  (STANDING):  aspirin enteric coated 81 milliGRAM(s) Oral daily  budesonide 160 MICROgram(s)/formoterol 4.5 MICROgram(s) Inhaler 2 Puff(s) Inhalation two times a day  fenofibrate Tablet 48 milliGRAM(s) Oral daily  fluconAZOLE  Oral Tab/Cap - Peds 400 milliGRAM(s) Oral every 24 hours  metoprolol succinate ER 50 milliGRAM(s) Oral two times a day  simvastatin 20 milliGRAM(s) Oral at bedtime  tamsulosin 0.4 milliGRAM(s) Oral at bedtime    MEDICATIONS  (PRN):  acetaminophen     Tablet .. 650 milliGRAM(s) Oral every 6 hours PRN Temp greater or equal to 38C (100.4F), Mild Pain (1 - 3)  aluminum hydroxide/magnesium hydroxide/simethicone Suspension 30 milliLiter(s) Oral every 4 hours PRN Dyspepsia  melatonin 3 milliGRAM(s) Oral at bedtime PRN Insomnia  ondansetron Injectable 4 milliGRAM(s) IV Push every 8 hours PRN Nausea and/or Vomiting        RADIOLOGY & ADDITIONAL TESTS:  < from: Xray Chest 1 View- PORTABLE-Urgent (Xray Chest 1 View- PORTABLE-Urgent .) (07.14.22 @ 11:33) >    ACC: [id number] EXAM:  XR CHEST PORTABLE URGENT 1V                          PROCEDURE DATE:  07/14/2022          INTERPRETATION:  AP erect chest on July 14, 2022 at 11:24 AM. Patient has   chest pain.    Heart likely enlarged. Sternotomy and left-sided pacemaker again noted.    Present film shows interstitial CHF bilaterally which is new since August 17, 2013. CHF is also new since March 11 of this year.    IMPRESSION: Bilateral CHF at this time.        ACC: [id number] EXAM:  ECHO TTE WO CON COMP W DOPP                        PROCEDURE DATE:  07/15/2022    Summary   S/p mitral valve repair. MV mean pressure gradient is 7 mmHg.   No mitral regurgitation.   Mildly calcified aortic valve.   Trance aortic insufficiency.   Moderate to severe tricuspid valve regurgitation.   Moderate pulmonary hypertension.   The left ventricle is normal in size, wall motion and contractility.   Mild symmetric left ventricular hypertrophy is present.   Estimated left ventricular ejection fraction is 50-55%.   A device wire is seen in the RV and RA.

## 2022-07-17 LAB
ADD ON TEST-SPECIMEN IN LAB: SIGNIFICANT CHANGE UP
ANION GAP SERPL CALC-SCNC: 4 MMOL/L — LOW (ref 5–17)
BUN SERPL-MCNC: 29 MG/DL — HIGH (ref 7–23)
CALCIUM SERPL-MCNC: 9.5 MG/DL — SIGNIFICANT CHANGE UP (ref 8.5–10.1)
CHLORIDE SERPL-SCNC: 103 MMOL/L — SIGNIFICANT CHANGE UP (ref 96–108)
CO2 SERPL-SCNC: 32 MMOL/L — HIGH (ref 22–31)
CREAT SERPL-MCNC: 1.51 MG/DL — HIGH (ref 0.5–1.3)
EGFR: 47 ML/MIN/1.73M2 — LOW
GLUCOSE SERPL-MCNC: 105 MG/DL — HIGH (ref 70–99)
INR BLD: 3.78 RATIO — HIGH (ref 0.88–1.16)
POTASSIUM SERPL-MCNC: 3.9 MMOL/L — SIGNIFICANT CHANGE UP (ref 3.5–5.3)
POTASSIUM SERPL-SCNC: 3.9 MMOL/L — SIGNIFICANT CHANGE UP (ref 3.5–5.3)
PROTHROM AB SERPL-ACNC: 44.5 SEC — HIGH (ref 10.5–13.4)
SARS-COV-2 RNA SPEC QL NAA+PROBE: SIGNIFICANT CHANGE UP
SODIUM SERPL-SCNC: 139 MMOL/L — SIGNIFICANT CHANGE UP (ref 135–145)

## 2022-07-17 PROCEDURE — 71045 X-RAY EXAM CHEST 1 VIEW: CPT | Mod: 26

## 2022-07-17 PROCEDURE — 99233 SBSQ HOSP IP/OBS HIGH 50: CPT

## 2022-07-17 RX ORDER — DILTIAZEM HCL 120 MG
30 CAPSULE, EXT RELEASE 24 HR ORAL THREE TIMES A DAY
Refills: 0 | Status: DISCONTINUED | OUTPATIENT
Start: 2022-07-17 | End: 2022-07-18

## 2022-07-17 RX ADMIN — FLUCONAZOLE 400 MILLIGRAM(S): 150 TABLET ORAL at 10:15

## 2022-07-17 RX ADMIN — Medication 30 MILLIGRAM(S): at 14:33

## 2022-07-17 RX ADMIN — BUDESONIDE AND FORMOTEROL FUMARATE DIHYDRATE 2 PUFF(S): 160; 4.5 AEROSOL RESPIRATORY (INHALATION) at 09:36

## 2022-07-17 RX ADMIN — Medication 48 MILLIGRAM(S): at 10:16

## 2022-07-17 RX ADMIN — Medication 81 MILLIGRAM(S): at 10:14

## 2022-07-17 RX ADMIN — Medication 50 MILLIGRAM(S): at 22:08

## 2022-07-17 RX ADMIN — Medication 30 MILLIGRAM(S): at 22:08

## 2022-07-17 RX ADMIN — TAMSULOSIN HYDROCHLORIDE 0.4 MILLIGRAM(S): 0.4 CAPSULE ORAL at 22:08

## 2022-07-17 RX ADMIN — Medication 50 MILLIGRAM(S): at 10:14

## 2022-07-17 RX ADMIN — SIMVASTATIN 20 MILLIGRAM(S): 20 TABLET, FILM COATED ORAL at 22:08

## 2022-07-17 RX ADMIN — BUDESONIDE AND FORMOTEROL FUMARATE DIHYDRATE 2 PUFF(S): 160; 4.5 AEROSOL RESPIRATORY (INHALATION) at 21:05

## 2022-07-17 NOTE — PROGRESS NOTE ADULT - SUBJECTIVE AND OBJECTIVE BOX
CC: Acute on chronic systolic congestive heart failure    HPI: 77M w/PMH Afib, s/p  PPM, HTN, CAD s/p CABG, asthma,  vocal cord ca, colon ca s/p resection, HLD,  presented to ED  c/o LUU w/ walking short distance, unable to walk in the park over the past week associated w/ palpitations and "heart racing", denies cp.  past year  was diagnosed w/ asthma so he's been using his INH more often w/out relief. He's been unable to sleep as he can't breath, he's been sleeping sitting up.  He denies f/c, cough, n/v/d, abd pain, dysuria.  +mild leg swelling. Pt currently on diflucan for thrush as prescribed by his ENT and needs 3 more days for the course.     Medical progress: Denies any HA, CP, SOB. NO fevers, chills or labs.   Complaints: no new complaints  State of mind: normal    REVIEW OF SYSTEMS:  All other review of systems is negative unless indicated above.    PHYSICAL EXAM:  ICU Vital Signs Last 24 Hrs  T(C): 36.7 (17 Jul 2022 08:32), Max: 36.7 (17 Jul 2022 08:32)  T(F): 98.1 (17 Jul 2022 08:32), Max: 98.1 (17 Jul 2022 08:32)  HR: 76 (17 Jul 2022 10:10) (58 - 116)  BP: 118/59 (17 Jul 2022 08:32) (105/56 - 118/59)  RR: 18 (17 Jul 2022 08:32) (18 - 18)  SpO2: 100% (17 Jul 2022 08:32) (94% - 100%)  General: Well developed; in no acute distress  Eyes:  EOMI; conjunctiva and sclera clear  Head: Normocephalic; atraumatic  ENMT: No nasal discharge; airway clear  Neck: Supple; non tender; no masses  Respiratory: Decreased BS, No wheezes, rales or rhonchi  Cardiovascular: Irregular rate and rhythm. S1 and S2 Normal;  Gastrointestinal: Soft non-tender non-distended; Normal bowel sounds  Genitourinary: No  suprapubic  tenderness  Extremities: +2 LE edema, slightly improved   Vascular: Peripheral pulses palpable 2+ bilaterally  Neurological: Alert and oriented x3, non focal   Skin: Warm and dry. No acute rash  Lymph Nodes: No acute cervical adenopathy  Musculoskeletal: Normal muscle tone, without deformities  Psychiatric: Cooperative and appropriate    LABS:                         12.8   9.18  )-----------( 271      ( 16 Jul 2022 06:31 )             41.1     07-16    138  |  102  |  28<H>  ----------------------------<  102<H>  3.7   |  31  |  1.44<H>    Ca    9.2      16 Jul 2022 06:31  Mg     2.3     07-16    TPro  7.2  /  Alb  3.5  /  TBili  1.0  /  DBili  x   /  AST  19  /  ALT  26  /  AlkPhos  57  07-15    LIVER FUNCTIONS - ( 15 Jul 2022 08:10 )  Alb: 3.5 g/dL / Pro: 7.2 gm/dL / ALK PHOS: 57 U/L / ALT: 26 U/L / AST: 19 U/L / GGT: x           PT/INR - ( 16 Jul 2022 06:31 )   PT: 62.0 sec;   INR: 5.26 ratio                              12.7   9.06  )-----------( 264      ( 15 Jul 2022 08:10 )             40.1     15 Jul 2022 08:10    136    |  101    |  23     ----------------------------<  87     4.1     |  29     |  1.28     Ca    9.5        15 Jul 2022 08:10  Mg     2.4       15 Jul 2022 08:10    TPro  7.2    /  Alb  3.5    /  TBili  1.0    /  DBili  x      /  AST  19     /  ALT  26     /  AlkPhos  57     15 Jul 2022 08:10    PT/INR - ( 15 Jul 2022 08:10 )   PT: 58.9 sec;   INR: 5.00 ratio    PTT - ( 14 Jul 2022 11:34 )  PTT:66.8 sec    LIVER FUNCTIONS - ( 15 Jul 2022 08:10 )  Alb: 3.5 g/dL / Pro: 7.2 gm/dL / ALK PHOS: 57 U/L / ALT: 26 U/L / AST: 19 U/L / GGT: x             MEDICATIONS  (STANDING):  aspirin enteric coated 81 milliGRAM(s) Oral daily  budesonide 160 MICROgram(s)/formoterol 4.5 MICROgram(s) Inhaler 2 Puff(s) Inhalation two times a day  fenofibrate Tablet 48 milliGRAM(s) Oral daily  fluconAZOLE  Oral Tab/Cap - Peds 400 milliGRAM(s) Oral every 24 hours  metoprolol succinate ER 50 milliGRAM(s) Oral two times a day  simvastatin 20 milliGRAM(s) Oral at bedtime  tamsulosin 0.4 milliGRAM(s) Oral at bedtime    MEDICATIONS  (PRN):  acetaminophen     Tablet .. 650 milliGRAM(s) Oral every 6 hours PRN Temp greater or equal to 38C (100.4F), Mild Pain (1 - 3)  aluminum hydroxide/magnesium hydroxide/simethicone Suspension 30 milliLiter(s) Oral every 4 hours PRN Dyspepsia  melatonin 3 milliGRAM(s) Oral at bedtime PRN Insomnia  ondansetron Injectable 4 milliGRAM(s) IV Push every 8 hours PRN Nausea and/or Vomiting        RADIOLOGY & ADDITIONAL TESTS:  < from: Xray Chest 1 View- PORTABLE-Urgent (Xray Chest 1 View- PORTABLE-Urgent .) (07.14.22 @ 11:33) >    ACC: [id number] EXAM:  XR CHEST PORTABLE URGENT 1V                          PROCEDURE DATE:  07/14/2022          INTERPRETATION:  AP erect chest on July 14, 2022 at 11:24 AM. Patient has   chest pain.    Heart likely enlarged. Sternotomy and left-sided pacemaker again noted.    Present film shows interstitial CHF bilaterally which is new since August 17, 2013. CHF is also new since March 11 of this year.    IMPRESSION: Bilateral CHF at this time.        ACC: [id number] EXAM:  ECHO TTE WO CON COMP W DOPP                        PROCEDURE DATE:  07/15/2022    Summary   S/p mitral valve repair. MV mean pressure gradient is 7 mmHg.   No mitral regurgitation.   Mildly calcified aortic valve.   Trance aortic insufficiency.   Moderate to severe tricuspid valve regurgitation.   Moderate pulmonary hypertension.   The left ventricle is normal in size, wall motion and contractility.   Mild symmetric left ventricular hypertrophy is present.   Estimated left ventricular ejection fraction is 50-55%.   A device wire is seen in the RV and RA.      77M w/PMH Afib, PPM, HTN, CAD/CABG, asthma, h/o vocal cord ca, colon ca s/p resection admitted for:       # acute HFmrEF new onset, likely due to AFIB with RVR    # AF with increase VR and Pulmonary congestion  - tele -  continue  -On   IV lasix, will hold further IV diuresis as BIN/CR went up, reeval in am   - Control HR, BB increased   - Hold off on ACEI until renal  Fx stable   - ECHO: EF 50-55%, MVR, mod-severe TR, mod Pulm HTN   - daily wts, strict i/o's, restrict fluid/sodium  - nutrition eval   - D/w DR García     # Chronic AFIB. S/p PPM. Supratherapeutic  INR  - pt has been on diflucan for thrush- likely interacting  - C/w Metroprolol   - hold coumadin tonight   - check INR daily, goal 2-3  - EP cs - interrogate and evaluate afib  - TSH wnl     # HTN   - Monitor BP  - C/w Metoprolol    # CAD, s/p CABG  No CP  Trop neg   EF 50-55%, no RWVA  C/w ASA, Metoprolol, Statin     # Asthma  Stable  C/w Symbicort  Albuterol PRN     # Oral thrush   On fluconazole , last dose tomorrow                   Recommend anticoagulation - he is currently on ASA  Would recommend maintenance of SR and would consider AAD and repeat OSCAR/cardioversion. Can consider AF ablation in the future  Discussed the options with patient and his family at bedside and he will d/w his cardiologist and if agree we could start amiodarone after OSCAR.          CC: Acute on chronic systolic congestive heart failure    HPI: 77M w/PMH Afib, s/p  PPM, HTN, CAD s/p CABG, asthma,  vocal cord ca, colon ca s/p resection, HLD,  presented to ED  c/o LUU w/ walking short distance, unable to walk in the park over the past week associated w/ palpitations and "heart racing", denies cp.  past year  was diagnosed w/ asthma so he's been using his INH more often w/out relief. He's been unable to sleep as he can't breath, he's been sleeping sitting up.  He denies f/c, cough, n/v/d, abd pain, dysuria.  +mild leg swelling. Pt currently on diflucan for thrush as prescribed by his ENT and needs 3 more days for the course.     Medical progress: Denies any HA, CP, SOB. NO fevers, chills or labs. The HRs remain poorly controlled. I spoke with Dr. García. Will discuss with patient cardioversion.   Complaints: no new complaints  State of mind: normal    REVIEW OF SYSTEMS:  All other review of systems is negative unless indicated above.    PHYSICAL EXAM:  ICU Vital Signs Last 24 Hrs  T(C): 36.7 (17 Jul 2022 08:32), Max: 36.7 (17 Jul 2022 08:32)  T(F): 98.1 (17 Jul 2022 08:32), Max: 98.1 (17 Jul 2022 08:32)  HR: 76 (17 Jul 2022 10:10) (58 - 116)  BP: 118/59 (17 Jul 2022 08:32) (105/56 - 118/59)  RR: 18 (17 Jul 2022 08:32) (18 - 18)  SpO2: 100% (17 Jul 2022 08:32) (94% - 100%)  General: obese  Eyes:  EOMI; conjunctiva and sclera clear  Head: Normocephalic; atraumatic  ENMT: No nasal discharge; airway clear  Neck: Supple; non tender; no masses  Respiratory: Decreased BS, No wheezes, rales or rhonchi  Cardiovascular: Irregular rate and rhythm. S1 and S2 Normal;  Gastrointestinal: Soft non-tender non-distended; Normal bowel sounds  Genitourinary: No  suprapubic  tenderness  Extremities: +2 LE edema, slightly improved   Vascular: Peripheral pulses palpable 2+ bilaterally  Neurological: Alert and oriented x3, non focal   Skin: Warm and dry. No acute rash  Lymph Nodes: No acute cervical adenopathy  Musculoskeletal: Normal muscle tone, without deformities  Psychiatric: Cooperative and appropriate    LABS:                CBC Full  -  ( 16 Jul 2022 06:31 )  WBC Count : 9.18 K/uL  RBC Count : 4.59 M/uL  Hemoglobin : 12.8 g/dL  Hematocrit : 41.1 %  Platelet Count - Automated : 271 K/uL  Mean Cell Volume : 89.5 fl  Mean Cell Hemoglobin : 27.9 pg  Mean Cell Hemoglobin Concentration : 31.1 gm/dL  Auto Neutrophil # : x  Auto Lymphocyte # : x  Auto Monocyte # : x  Auto Eosinophil # : x  Auto Basophil # : x  Auto Neutrophil % : x  Auto Lymphocyte % : x  Auto Monocyte % : x  Auto Eosinophil % : x  Auto Basophil % : x    PT/INR - ( 17 Jul 2022 06:02 )   PT: 44.5 sec;   INR: 3.78 ratio             07-17    139  |  103  |  29<H>  ----------------------------<  105<H>  3.9   |  32<H>  |  1.51<H>    Ca    9.5      17 Jul 2022 06:02  Mg     2.5     07-17            ACC: [id number] EXAM:  ECHO TTE WO CON COMP W DOPP                        PROCEDURE DATE:  07/15/2022    Summary   S/p mitral valve repair. MV mean pressure gradient is 7 mmHg.   No mitral regurgitation.   Mildly calcified aortic valve.   Trance aortic insufficiency.   Moderate to severe tricuspid valve regurgitation.   Moderate pulmonary hypertension.   The left ventricle is normal in size, wall motion and contractility.   Mild symmetric left ventricular hypertrophy is present.   Estimated left ventricular ejection fraction is 50-55%.   A device wire is seen in the RV and RA.      77M w/PMH Afib, PPM, HTN, CAD/CABG, asthma, h/o vocal cord ca, colon ca s/p resection admitted for:       # acute HFmrEF new onset, likely due to AFIB with RVR    # AF with increase VR and Pulmonary congestion  - tele -  continue  -On   IV lasix, will hold further IV diuresis as BIN/CR went up, reeval in am   - Control HR, BB increased   - Hold off on ACEI until renal  Fx stable   - ECHO: EF 50-55%, MVR, mod-severe TR, mod Pulm HTN   - daily wts, strict i/o's, restrict fluid/sodium  - nutrition eval   - D/w DR García     # Chronic AFIB. S/p PPM. Supratherapeutic  INR  - pt has been on diflucan for thrush- likely interacting  - C/w Metroprolol   - hold coumadin tonight   - check INR daily, goal 2-3  - EP cs - interrogate and evaluate afib  - TSH wnl     # HTN   - Monitor BP  - C/w Metoprolol    # CAD, s/p CABG  No CP  Trop neg   EF 50-55%, no RWVA  C/w ASA, Metoprolol, Statin     # Asthma  Stable  C/w Symbicort  Albuterol PRN     # Oral thrush   On fluconazole , last dose tomorrow                   Recommend anticoagulation - he is currently on ASA  Would recommend maintenance of SR and would consider AAD and repeat OSCAR/cardioversion. Can consider AF ablation in the future  Discussed the options with patient and his family at bedside and he will d/w his cardiologist and if agree we could start amiodarone after OSCAR.

## 2022-07-17 NOTE — PROGRESS NOTE ADULT - ASSESSMENT
Acute on chronic diastolic CHF  Rapid AF  Supra therapeutic INR  Hypertension  CAD (coronary artery disease).  S/P CABG x 3, MVR  s/p PPM      Suggest:  HR intermittently elevated - add cardizem and cont metoprol.  OSCAR DCCV in AM  NPO  Hold anticoagulation - INR >3; will discuss DOAC w/ Dr Laguna  Cont ASA  Cont statin

## 2022-07-17 NOTE — PROGRESS NOTE ADULT - SUBJECTIVE AND OBJECTIVE BOX
The patient was seen and examined. No acute events overnight.  No chest pain.  Improved shortness of breath.  No palpitations.  HR intermittently elevated.     This is a 77-year-old male presents with complaints of progressive shortness of breath over the last few days.  Also has complaints of worsening edema of the lower extremity.  Notices his heartbeat is racing.  He has history of atrial fibrillation.  Denies noncompliance to medications.  Recently he has had dietary indiscretion and has been having salty food, including Chinese and takeout food daily for the last week.      PAST MEDICAL & SURGICAL HISTORY:  Atrial fibrillation  Mitral regurgitation s/p mitral valve replacement  Hypertension, unspecified type  HLD (hyperlipidemia)  CAD (coronary artery disease)  S/P CABG x 3, MVR  s/p PPM  S/P rotator cuff surgery  Colon CA. s/p colon resection Feb 2022  Asthma   Vocal Cord cancer- 2016        PREVIOUS CARDIAC WORKUP:      < from: TTE Echo Complete w/o Contrast w/ Doppler (07.15.22 @ 10:06) >   S/p mitral valve repair. MV mean pressure gradient is 7 mmHg.   No mitral regurgitation.   Mildly calcified aortic valve.   Trance aortic insufficiency.   Moderate to severe tricuspid valve regurgitation.   Moderate pulmonary hypertension.   The left ventricle is normal in size, wall motion and contractility.   Mild symmetric left ventricular hypertrophy is present.   Estimated left ventricular ejection fraction is 50-55%.   A device wire is seen in the RV and RA.      Stress Test:  Feb 2002    ·	Rest myocardial perfusion gated SPECT imaging was performed, showing a left ventricular ejection fraction of 62 %,  ·	Post stress resting myocardial perfusion gated SPECT imaging was performed, showing a left ventricular ejection fraction of 67 %,  ·	There is a small size defect, in the apical inferior and apical wall(s), that is fixed, suggestive with diaphragmatic attenuation artifact and soft tissue attenuation.  ·	Rest gated analysis showed normal left ventricular function with normal left ventricle size  ·	Post stress analysis showed normal left ventricular function with normal left ventricle size  ·	Gated wall motion analysis shows normal wall motion.  ·	Overall impression: Normal.  ·	Left ventricular perfusion is probably normal.  ·	No ECG evidence of ischemia after administration of IV regadenoson.  ·	SPECT results are limited by artifact and show no definite ischemia or infarct.    Review of systems: A 10 point review of system has been performed, and is negative except for what has been mentioned in the above history of present illness.    MEDICATIONS  (STANDING):  aspirin enteric coated 81 milliGRAM(s) Oral daily  budesonide 160 MICROgram(s)/formoterol 4.5 MICROgram(s) Inhaler 2 Puff(s) Inhalation two times a day  diltiazem    Tablet 30 milliGRAM(s) Oral three times a day  fenofibrate Tablet 48 milliGRAM(s) Oral daily  metoprolol succinate ER 50 milliGRAM(s) Oral two times a day  simvastatin 20 milliGRAM(s) Oral at bedtime  tamsulosin 0.4 milliGRAM(s) Oral at bedtime    MEDICATIONS  (PRN):  acetaminophen     Tablet .. 650 milliGRAM(s) Oral every 6 hours PRN Temp greater or equal to 38C (100.4F), Mild Pain (1 - 3)  aluminum hydroxide/magnesium hydroxide/simethicone Suspension 30 milliLiter(s) Oral every 4 hours PRN Dyspepsia  melatonin 3 milliGRAM(s) Oral at bedtime PRN Insomnia  ondansetron Injectable 4 milliGRAM(s) IV Push every 8 hours PRN Nausea and/or Vomiting      Vital Signs Last 24 Hrs  T(C): 36.7 (17 Jul 2022 08:32), Max: 36.7 (17 Jul 2022 08:32)  T(F): 98.1 (17 Jul 2022 08:32), Max: 98.1 (17 Jul 2022 08:32)  HR: 76 (17 Jul 2022 10:10) (58 - 116)  BP: 118/59 (17 Jul 2022 08:32) (105/56 - 118/59)  BP(mean): --  RR: 18 (17 Jul 2022 08:32) (18 - 18)  SpO2: 100% (17 Jul 2022 08:32) (94% - 100%)    Parameters below as of 17 Jul 2022 08:32  Patient On (Oxygen Delivery Method): room air      I&O's Summary        PHYSICAL EXAM:    General:                Comfortable, AAO X 3, in no distress. Obese  HEENT:                  Atraumatic, PERRLA, EOMI, conjunctiva clear.   Neck:                     Supple, no adenopathy, no thyromegaly, no JVD, no bruit.  Chest:                   B/L symmetric reduced basal air entry, rales  Heart:                     S1, S2 irregular, + murmur.  Abdomen:              Soft, non-tender, bowel sounds active. No palpable masses.  Extremities:           no cyanosis, + edema. Peripheral pulses normal.  Skin:                      Skin color, texture normal. No rashes.  Neurologic:            Grossly nonfocal.       TELEMETRY:  atrial fibrillation with mod vent response  ECG:   Rapid AF    LABS:                                  12.8   9.18  )-----------( 271      ( 16 Jul 2022 06:31 )             41.1          07-17    139  |  103  |  29<H>  ----------------------------<  105<H>  3.9   |  32<H>  |  1.51<H>    Ca    9.5      17 Jul 2022 06:02  Mg     2.5     07-17       PT/INR - ( 17 Jul 2022 06:02 )   PT: 44.5 sec;   INR: 3.78 ratio             Pro BNP  [id number] 07-14 @ 11:34      RADIOLOGY & ADDITIONAL STUDIES:    < from: Xray Chest 1 View- PORTABLE-Urgent (Xray Chest 1 View- PORTABLE-Urgent .) (07.14.22 @ 11:33) >  IMPRESSION: Bilateral CHF at this time.

## 2022-07-17 NOTE — PROGRESS NOTE ADULT - SUBJECTIVE AND OBJECTIVE BOX
Feeling well. SOB resolve. Ambulating without symptoms       TELE: AF rate     MEDICATIONS  (STANDING):  aspirin enteric coated 81 milliGRAM(s) Oral daily  budesonide 160 MICROgram(s)/formoterol 4.5 MICROgram(s) Inhaler 2 Puff(s) Inhalation two times a day  diltiazem    Tablet 30 milliGRAM(s) Oral three times a day  fenofibrate Tablet 48 milliGRAM(s) Oral daily  metoprolol succinate ER 50 milliGRAM(s) Oral two times a day  simvastatin 20 milliGRAM(s) Oral at bedtime  tamsulosin 0.4 milliGRAM(s) Oral at bedtime    MEDICATIONS  (PRN):  acetaminophen     Tablet .. 650 milliGRAM(s) Oral every 6 hours PRN Temp greater or equal to 38C (100.4F), Mild Pain (1 - 3)  aluminum hydroxide/magnesium hydroxide/simethicone Suspension 30 milliLiter(s) Oral every 4 hours PRN Dyspepsia  melatonin 3 milliGRAM(s) Oral at bedtime PRN Insomnia  ondansetron Injectable 4 milliGRAM(s) IV Push every 8 hours PRN Nausea and/or Vomiting      Allergies    No Known Allergies    Intolerances      PAST MEDICAL & SURGICAL HISTORY:  Atrial fibrillation      Mitral regurgitation      Hypertension, unspecified type      HLD (hyperlipidemia)      CAD (coronary artery disease)      S/P rotator cuff surgery      S/P CABG x 3          Vital Signs Last 24 Hrs  T(C): 36.5 (17 Jul 2022 17:13), Max: 36.7 (17 Jul 2022 08:32)  T(F): 97.7 (17 Jul 2022 17:13), Max: 98.1 (17 Jul 2022 08:32)  HR: 105 (17 Jul 2022 17:13) (58 - 105)  BP: 112/77 (17 Jul 2022 17:13) (105/56 - 123/81)  BP(mean): --  RR: 18 (17 Jul 2022 17:13) (18 - 18)  SpO2: 100% (17 Jul 2022 17:13) (94% - 100%)    Parameters below as of 17 Jul 2022 17:13  Patient On (Oxygen Delivery Method): room air        Physical Exam:  Constitutional: obese, NAD  Cardiovascular: +S1S2 irregular  Pulmonary: CTA b/l, unlabored  Abd: soft NTND +BS  Extremities: no pedal edema, +distal pulses b/l  Neuro: non focal  LABS:                        12.8   9.18  )-----------( 271      ( 16 Jul 2022 06:31 )             41.1     07-17    139  |  103  |  29<H>  ----------------------------<  105<H>  3.9   |  32<H>  |  1.51<H>    Ca    9.5      17 Jul 2022 06:02  Mg     2.5     07-17      PT/INR - ( 17 Jul 2022 06:02 )   PT: 44.5 sec;   INR: 3.78 ratio                   Plan:     Access site care and activity limitations reviewed w/ pt.   Outpt f/up in 4-6 weeks.

## 2022-07-17 NOTE — PROGRESS NOTE ADULT - ASSESSMENT
Remains in AF - rates < 100  PM interrogation showed normal function  INR was elevated 3.78 and warfarin held  Plan for OSCAR/CV  Discussed AAD vs ablation  He claims he was on amio previously and was not effective   EP follow up to discuss ablation - discussed with patient and family today and not sure he wants to have procedure - he feels it will not work.

## 2022-07-18 ENCOUNTER — TRANSCRIPTION ENCOUNTER (OUTPATIENT)
Age: 77
End: 2022-07-18

## 2022-07-18 VITALS — WEIGHT: 249.78 LBS

## 2022-07-18 LAB
ANION GAP SERPL CALC-SCNC: 3 MMOL/L — LOW (ref 5–17)
BUN SERPL-MCNC: 29 MG/DL — HIGH (ref 7–23)
CALCIUM SERPL-MCNC: 9.5 MG/DL — SIGNIFICANT CHANGE UP (ref 8.5–10.1)
CHLORIDE SERPL-SCNC: 106 MMOL/L — SIGNIFICANT CHANGE UP (ref 96–108)
CO2 SERPL-SCNC: 32 MMOL/L — HIGH (ref 22–31)
CREAT SERPL-MCNC: 1.44 MG/DL — HIGH (ref 0.5–1.3)
EGFR: 49 ML/MIN/1.73M2 — LOW
GLUCOSE SERPL-MCNC: 100 MG/DL — HIGH (ref 70–99)
HCT VFR BLD CALC: 40.6 % — SIGNIFICANT CHANGE UP (ref 39–50)
HGB BLD-MCNC: 12.6 G/DL — LOW (ref 13–17)
INR BLD: 3.18 RATIO — HIGH (ref 0.88–1.16)
MCHC RBC-ENTMCNC: 27.9 PG — SIGNIFICANT CHANGE UP (ref 27–34)
MCHC RBC-ENTMCNC: 31 GM/DL — LOW (ref 32–36)
MCV RBC AUTO: 90 FL — SIGNIFICANT CHANGE UP (ref 80–100)
PLATELET # BLD AUTO: 257 K/UL — SIGNIFICANT CHANGE UP (ref 150–400)
POTASSIUM SERPL-MCNC: 4.2 MMOL/L — SIGNIFICANT CHANGE UP (ref 3.5–5.3)
POTASSIUM SERPL-SCNC: 4.2 MMOL/L — SIGNIFICANT CHANGE UP (ref 3.5–5.3)
PROTHROM AB SERPL-ACNC: 37.3 SEC — HIGH (ref 10.5–13.4)
RBC # BLD: 4.51 M/UL — SIGNIFICANT CHANGE UP (ref 4.2–5.8)
RBC # FLD: 14.7 % — HIGH (ref 10.3–14.5)
SODIUM SERPL-SCNC: 141 MMOL/L — SIGNIFICANT CHANGE UP (ref 135–145)
WBC # BLD: 8.78 K/UL — SIGNIFICANT CHANGE UP (ref 3.8–10.5)
WBC # FLD AUTO: 8.78 K/UL — SIGNIFICANT CHANGE UP (ref 3.8–10.5)

## 2022-07-18 PROCEDURE — 93010 ELECTROCARDIOGRAM REPORT: CPT

## 2022-07-18 PROCEDURE — 99239 HOSP IP/OBS DSCHRG MGMT >30: CPT

## 2022-07-18 RX ORDER — ALBUTEROL 90 UG/1
2 AEROSOL, METERED ORAL
Qty: 1 | Refills: 0
Start: 2022-07-18 | End: 2022-08-16

## 2022-07-18 RX ORDER — WARFARIN SODIUM 2.5 MG/1
1 TABLET ORAL
Qty: 0 | Refills: 0 | DISCHARGE

## 2022-07-18 RX ORDER — DILTIAZEM HCL 120 MG
1 CAPSULE, EXT RELEASE 24 HR ORAL
Qty: 30 | Refills: 0
Start: 2022-07-18 | End: 2022-08-16

## 2022-07-18 RX ORDER — WARFARIN SODIUM 2.5 MG/1
1 TABLET ORAL
Qty: 15 | Refills: 0
Start: 2022-07-18 | End: 2022-08-01

## 2022-07-18 RX ADMIN — Medication 48 MILLIGRAM(S): at 13:24

## 2022-07-18 RX ADMIN — Medication 30 MILLIGRAM(S): at 13:24

## 2022-07-18 RX ADMIN — BUDESONIDE AND FORMOTEROL FUMARATE DIHYDRATE 2 PUFF(S): 160; 4.5 AEROSOL RESPIRATORY (INHALATION) at 08:49

## 2022-07-18 RX ADMIN — Medication 50 MILLIGRAM(S): at 13:25

## 2022-07-18 NOTE — PACU DISCHARGE NOTE - COMMENTS
Report given to Eden DAS RN. Verified with tele tech that the patient is on cardiac monitor. Denies pain or discomfort. Transferred patient back to  with transport staff.

## 2022-07-18 NOTE — DISCHARGE NOTE NURSING/CASE MANAGEMENT/SOCIAL WORK - NSDCPEFALRISK_GEN_ALL_CORE
For information on Fall & Injury Prevention, visit: [email or web address] OR  [email or web address] OR  https://www.cdc.gov/steadi/patient.html

## 2022-07-18 NOTE — DISCHARGE NOTE PROVIDER - HOSPITAL COURSE
# Chronic AFIB. S/p PPM. Supratherapeutic  INR  - pt has been on diflucan for thrush- likely interacting  - C/w Metroprolol   - hold coumadin tonight   - check INR daily, goal 2-3  - EP cs - interrogate and evaluate afib  - TSH wnl     # HTN   - Monitor BP  - C/w Metoprolol    # CAD, s/p CABG  No CP  Trop neg   EF 50-55%, no RWVA  C/w ASA, Metoprolol, Statin     # Asthma  Stable  C/w Symbicort  Albuterol PRN     # Oral thrush   On fluconazole , last dose tomorrow   CC: Acute on chronic systolic congestive heart failure    HPI: 77M w/PMH Afib, s/p  PPM, HTN, CAD s/p CABG, asthma,  vocal cord ca, colon ca s/p resection, HLD,  presented to ED  c/o LUU w/ walking short distance, unable to walk in the park over the past week associated w/ palpitations and "heart racing", denies cp.  past year  was diagnosed w/ asthma so he's been using his INH more often w/out relief. He's been unable to sleep as he can't breath, he's been sleeping sitting up.  He denies f/c, cough, n/v/d, abd pain, dysuria.  +mild leg swelling. Pt currently on diflucan for thrush as prescribed by his ENT and needs 3 more days for the course.     Doing well post cardioversion. Denies any HA, CP, SOB. Discussed with Dr. Laguna - will continue with coumadin, as patient ha sa valvular heart disease.     Physical Exam:   GENERAL APPEARANCE:  NAD, hemodynamically stable  T(C): 36.5 (07-18-22 @ 10:12), Max: 36.7 (07-18-22 @ 08:30)  HR: 64 (07-18-22 @ 11:35) (55 - 112)  BP: 112/72 (07-18-22 @ 11:35) (111/61 - 150/92)  RR: 18 (07-18-22 @ 11:35) (18 - 20)  SpO2: 97% (07-18-22 @ 11:35) (96% - 100%)  Wt(kg): --  HEENT:  Head is normocephalic    Skin:  Warm and dry without any rash   NECK:  Supple without lymphadenopathy.   HEART:  Regular rate and rhythm. normal S1 and S2, No M/R/G  LUNGS:  Good ins/exp effort, no W/R/R/C  ABDOMEN:  Soft, nontender, nondistended with good bowel sounds heard  EXTREMITIES:  Without cyanosis, clubbing or edema.   NEUROLOGICAL:  Gross nonfocal   CC: Acute on chronic systolic congestive heart failure    HPI: 77M w/PMH Afib, s/p  PPM, HTN, CAD s/p CABG, asthma,  vocal cord ca, colon ca s/p resection, HLD,  presented to ED  c/o LUU w/ walking short distance, unable to walk in the park over the past week associated w/ palpitations and "heart racing", denies cp.  past year  was diagnosed w/ asthma so he's been using his INH more often w/out relief. He's been unable to sleep as he can't breath, he's been sleeping sitting up.  He denies f/c, cough, n/v/d, abd pain, dysuria.  +mild leg swelling. Pt currently on diflucan for thrush as prescribed by his ENT and needs 3 more days for the course.     Doing well post cardioversion. Denies any HA, CP, SOB. Discussed with Dr. Laguna - will continue with coumadin, as patient ha sa valvular heart disease. Discharge meds discussed with dr. laguna    Physical Exam:   GENERAL APPEARANCE:  NAD, hemodynamically stable  T(C): 36.5 (07-18-22 @ 10:12), Max: 36.7 (07-18-22 @ 08:30)  HR: 64 (07-18-22 @ 11:35) (55 - 112)  BP: 112/72 (07-18-22 @ 11:35) (111/61 - 150/92)  RR: 18 (07-18-22 @ 11:35) (18 - 20)  SpO2: 97% (07-18-22 @ 11:35) (96% - 100%)  Wt(kg): --  HEENT:  Head is normocephalic    Skin:  Warm and dry without any rash   NECK:  Supple without lymphadenopathy.   HEART:  Regular rate and rhythm. normal S1 and S2, No M/R/G  LUNGS:  Good ins/exp effort, no W/R/R/C  ABDOMEN:  Soft, nontender, nondistended with good bowel sounds heard  EXTREMITIES:  Without cyanosis, clubbing or edema.   NEUROLOGICAL:  Gross nonfocal

## 2022-07-18 NOTE — PROCEDURAL SAFETY CHECKLIST WITH OR WITHOUT SEDATION - NSPOSTCOMMENTFT_GEN_ALL_CORE
Probe in at      . Bubble study at    . Tolerated well. Probe out at    . Cardioverted with  200J x 1, converteed Probe in at 1049 by Dr. Laguna. Bubble study at 1056. Tolerated well. Probe out at 1057. Cardioverted with  200J x 1, converted to NSR/ AV Paced. Post procedure EKG done. Probe in at 1049 by Dr. Laguna. Bubble study at 1056. Tolerated well. Probe out at 1057. Cardioverted with  200J x 1, converted to NSR/ AV Paced. PPM interrogated by Halie Gaviria Rep. Post procedure EKG done.

## 2022-07-18 NOTE — DISCHARGE NOTE PROVIDER - NSDCFUADDAPPT_GEN_ALL_CORE_FT
Close follow up With Dr. García Close follow up with Dr. Laguna Close follow up with Dr. Laguna this week

## 2022-07-18 NOTE — DISCHARGE NOTE PROVIDER - NSDCCPCAREPLAN_GEN_ALL_CORE_FT
PRINCIPAL DISCHARGE DIAGNOSIS  Diagnosis: Acute on chronic systolic congestive heart failure  Assessment and Plan of Treatment: # acute HFmrEF new onset, likely due to AFIB with RVR    WHAT IS HEART FAILURE? Heart failure (HF) is a condition in which the heart cannot pump enough blood to meet the body’s needs. The weakening of the heart’s pumping ability results in the buildup of fluid in the feet, ankles and legs resulting in edema, tiredness, and shortness of breath.  THINGS TO DO: (1) Weigh yourself daily – keep a log for you cardiologist (2) Maintain a heart healthy diet and limit dietary sodium (3) Drink liquids as directed – you may need to limit the amount of liquid you drink to prevent fluid buildup (4) Maintain a healthy weight (5) Stay active with exercise  MONITOR THESE SIGNS AND SYMPTOMS: (1) Worsening shortness of breath at rest or at night (2) worsening leg swelling (3) Abdominal pain or swelling (4) Chest pain or palpitations (5) Weight gain of 5lbs or more in 1 week or 2-3lbs in 24hours. If you experience any of these, DO alert your primary care provider, or return to the Emergency Department if you feel very sick.      SECONDARY DISCHARGE DIAGNOSES  Diagnosis: Chronic atrial fibrillation  Assessment and Plan of Treatment: # AF with increase VR and Pulmonary congestion  - you were convered by cardioversion 7/18 normal sinus rhythm  WHAT IS ATRIAL FIBRILLATION? Atrial fibrillation (AFIB) is a cardiac arrhythmia caused by a disorder in the hearts electrical system. An arrhythmia is a problem with the speed or rhythm of the heartbeat. Atrial fibrillation is the most common type of arrhythmia.  THINGS TO DO: (1) Monitor your blood pressure and heart rate (2) Limit or avoid alcohol intake – alcohol can increase your risk of AFIB (3) Do not smoke – nicotine can damage your heart and make it difficult to manage your AFIB (4) Eat heart healthy foods like fruits, vegetables, and whole grains (5) Manage a healthy weight (5) Get regular physical activity  MONITOR THESE SIGNS AND SYMPTOMS: (1) Shortness of breath (2) Nausea or vomiting (3) Chest pain or pressure (4) Chest palpitations. If you experience any of these, DO alert your primary care provider, or return to the Emergency Department if you feel very sick.       Diagnosis: Elevated INR  Assessment and Plan of Treatment: - INR 3.18  - continue to monitor as an outpatient       PRINCIPAL DISCHARGE DIAGNOSIS  Diagnosis: Acute on chronic systolic congestive heart failure  Assessment and Plan of Treatment: # acute HFmrEF new onset, likely due to AFIB with RVR    WHAT IS HEART FAILURE? Heart failure (HF) is a condition in which the heart cannot pump enough blood to meet the body’s needs. The weakening of the heart’s pumping ability results in the buildup of fluid in the feet, ankles and legs resulting in edema, tiredness, and shortness of breath.  THINGS TO DO: (1) Weigh yourself daily – keep a log for you cardiologist (2) Maintain a heart healthy diet and limit dietary sodium (3) Drink liquids as directed – you may need to limit the amount of liquid you drink to prevent fluid buildup (4) Maintain a healthy weight (5) Stay active with exercise  MONITOR THESE SIGNS AND SYMPTOMS: (1) Worsening shortness of breath at rest or at night (2) worsening leg swelling (3) Abdominal pain or swelling (4) Chest pain or palpitations (5) Weight gain of 5lbs or more in 1 week or 2-3lbs in 24hours. If you experience any of these, DO alert your primary care provider, or return to the Emergency Department if you feel very sick.      SECONDARY DISCHARGE DIAGNOSES  Diagnosis: Chronic atrial fibrillation  Assessment and Plan of Treatment: # AF with increase VR and Pulmonary congestion  - you were convered by cardioversion 7/18 normal sinus rhythm  WHAT IS ATRIAL FIBRILLATION? Atrial fibrillation (AFIB) is a cardiac arrhythmia caused by a disorder in the hearts electrical system. An arrhythmia is a problem with the speed or rhythm of the heartbeat. Atrial fibrillation is the most common type of arrhythmia.  THINGS TO DO: (1) Monitor your blood pressure and heart rate (2) Limit or avoid alcohol intake – alcohol can increase your risk of AFIB (3) Do not smoke – nicotine can damage your heart and make it difficult to manage your AFIB (4) Eat heart healthy foods like fruits, vegetables, and whole grains (5) Manage a healthy weight (5) Get regular physical activity  MONITOR THESE SIGNS AND SYMPTOMS: (1) Shortness of breath (2) Nausea or vomiting (3) Chest pain or pressure (4) Chest palpitations. If you experience any of these, DO alert your primary care provider, or return to the Emergency Department if you feel very sick.       Diagnosis: Elevated INR  Assessment and Plan of Treatment: - INR 3.18 /  restart coumadin tomorrow  - we will restart coumadin 4 mg once daily, instead of 5 mg   - continue to monitor as an outpatient  - close follow up with Dr. Laguna

## 2022-07-18 NOTE — DISCHARGE NOTE NURSING/CASE MANAGEMENT/SOCIAL WORK - PATIENT PORTAL LINK FT
You can access the FollowMyHealth Patient Portal offered by [facility] by registering at the following website: [email or web address]. By joining [employer]’s FollowMyHealth portal, you will also be able to view your health information using other applications (apps) compatible with our system.

## 2022-07-22 DIAGNOSIS — Z79.01 LONG TERM (CURRENT) USE OF ANTICOAGULANTS: ICD-10-CM
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
## 2022-07-22 DIAGNOSIS — Z90.49 ACQUIRED ABSENCE OF OTHER SPECIFIED PARTS OF DIGESTIVE TRACT: ICD-10-CM

## 2022-07-22 DIAGNOSIS — I49.5 SICK SINUS SYNDROME: ICD-10-CM

## 2022-07-22 DIAGNOSIS — Z79.82 LONG TERM (CURRENT) USE OF ASPIRIN: ICD-10-CM

## 2022-07-22 DIAGNOSIS — E78.5 HYPERLIPIDEMIA, UNSPECIFIED: ICD-10-CM

## 2022-07-22 DIAGNOSIS — Z87.891 PERSONAL HISTORY OF NICOTINE DEPENDENCE: ICD-10-CM
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
## 2022-07-22 DIAGNOSIS — T37.8X5A ADVERSE EFFECT OF OTHER SPECIFIED SYSTEMIC ANTI-INFECTIVES AND ANTIPARASITICS, INITIAL ENCOUNTER: ICD-10-CM

## 2022-07-22 DIAGNOSIS — I11.0 HYPERTENSIVE HEART DISEASE WITH HEART FAILURE: ICD-10-CM

## 2022-07-22 DIAGNOSIS — Z95.0 PRESENCE OF CARDIAC PACEMAKER: ICD-10-CM

## 2022-07-22 DIAGNOSIS — B37.0 CANDIDAL STOMATITIS: ICD-10-CM

## 2022-07-22 DIAGNOSIS — Z79.899 OTHER LONG TERM (CURRENT) DRUG THERAPY: ICD-10-CM

## 2022-07-22 DIAGNOSIS — Z95.1 PRESENCE OF AORTOCORONARY BYPASS GRAFT: ICD-10-CM

## 2022-07-22 DIAGNOSIS — I48.19 OTHER PERSISTENT ATRIAL FIBRILLATION: ICD-10-CM

## 2022-07-22 DIAGNOSIS — Z85.21 PERSONAL HISTORY OF MALIGNANT NEOPLASM OF LARYNX: ICD-10-CM

## 2022-07-22 DIAGNOSIS — D68.4 ACQUIRED COAGULATION FACTOR DEFICIENCY: ICD-10-CM

## 2022-07-22 DIAGNOSIS — Z85.038 PERSONAL HISTORY OF OTHER MALIGNANT NEOPLASM OF LARGE INTESTINE: ICD-10-CM

## 2022-07-22 DIAGNOSIS — I25.10 ATHEROSCLEROTIC HEART DISEASE OF NATIVE CORONARY ARTERY WITHOUT ANGINA PECTORIS: ICD-10-CM

## 2022-07-22 DIAGNOSIS — J45.909 UNSPECIFIED ASTHMA, UNCOMPLICATED: ICD-10-CM

## 2022-07-22 DIAGNOSIS — I50.23 ACUTE ON CHRONIC SYSTOLIC (CONGESTIVE) HEART FAILURE: ICD-10-CM

## 2022-08-08 ENCOUNTER — FORM ENCOUNTER (OUTPATIENT)
Age: 77
End: 2022-08-08

## 2022-08-16 ENCOUNTER — APPOINTMENT (OUTPATIENT)
Dept: ELECTROPHYSIOLOGY | Facility: CLINIC | Age: 77
End: 2022-08-16

## 2022-08-16 ENCOUNTER — NON-APPOINTMENT (OUTPATIENT)
Age: 77
End: 2022-08-16

## 2022-08-16 PROCEDURE — 93294 REM INTERROG EVL PM/LDLS PM: CPT

## 2022-08-16 PROCEDURE — 93296 REM INTERROG EVL PM/IDS: CPT

## 2022-09-21 NOTE — PATIENT PROFILE ADULT - NSTRANSFERBELONGINGSDISPO_GEN_A_NUR
Physical Therapy Visit    Referred by: JOSUE Pickard; Medical Diagnosis (from order):    Diagnosis Information      Diagnosis    724.2 (ICD-9-CM) - M54.50 (ICD-10-CM) - Acute left-sided low back pain without sciatica    922.31 (ICD-9-CM) - S20.222A (ICD-10-CM) - Contusion of left side of back, initial encounter    V54.17 (ICD-9-CM) - S32.020D (ICD-10-CM) - Compression fracture of L2 vertebra with routine healing, subsequent encounter              Visit: 9    Visit Type: Daily Treatment Note    SUBJECTIVE                                                                                                               A video  was used. The patient reported that she can feel the tightness at the left side of the low back, when she has to bend over. She feels pain at the left side of the low back, if she extends back.  Pain / Symptoms:  Pain rating (out of 10): Current: 0 (pain)     OBJECTIVE                                                                                                                       Palpation:   Comments / Details: Mild hypertonicity at the left lumbar paraspinals.      TREATMENT                                                                                                                  Therapeutic Exercise:  Bridge with bilateral hip abduction. Red tubing above the knees. 2 x 10.  Hip hinge with wand behind the back partial double leg squats. 2 x 10.  Seated bilateral lat pull downs. Green tubing. 2 x 10.  Standing on the 1st step hip diagonals with support. Each side. 2 x 10.  Free motion:  -Side stepping with knees slightly flexed. Each side x 4 with 7 pounds.  Standing anti-rotation:  -In and out. Each side. Blue tubing. 2 x 10.        Manual Therapy:  The rationale and possible effects of Functional Cupping were explained to patient, and informed, verbal consent was received.  Tool used:  Large silicone cup; Patient position:  Prone with pillow under the hips; Location of  treatment:  Left lumbar paraspinals with circular (clockwise and counter clockwise) and stroking patterns; Length of treatment:  8 minutes  Side lying lumbo-pelvic articulation. Each side.      Skilled input: verbal instruction/cues, tactile instruction/cues and posture correction    Writer verbally educated and received verbal consent for hand placement, positioning of patient, and techniques to be performed today from patient for hand placement and palpation for techniques and therapist position for techniques as described above and how they are pertinent to the patient's plan of care.    Home Exercise Program/Education Materials: Access Code: [email or web address]  URL: [email or web address]  Date: 09/07/2022  Prepared by: Jesus Aj  Exercises  · Seated Piriformis Stretch Each Side - 2-3 x daily - 2 reps - 30 sec hold  · Supine Single Knee to Chest Stretch Each Side - 2-3 x daily - 2 reps - 30 sec hold  · Prone Press Up - 2 x daily - 7 x weekly - 1 sets - 10 reps  · Supine Lower Trunk Rotation - 2 x daily - 7 x weekly - 1 sets - 10 reps  · Supine Transversus Abdominis Bracing - Hands on Stomach - 2 x daily - 7 x weekly - 1 sets - 10 reps - 5 hold  · Beginner Bridge - 1-2 x daily - 7 x weekly - 1-2 sets - 10 reps  · Child's Pose Stretch to the Right and Left - 2 x daily - 7 x weekly - 2-3 sets - 30 hold  · Clamshell Each Side - 1 x daily - 7 x weekly - 2-3 sets - 10 reps                  ASSESSMENT                                                                                                             The patient responded to the cupping treatment. The patient was able to go into lumbar flexion AROM with no complaints of tightness a the left side of the low back, after the cupping treatment. The patient is doing better with the hip hinging, which is required for proper body mechanics when lifting, but still requires verbal and tactile cuing for correction of form.  Pain/symptoms after session (out  of 10): 0 (pain)  Patient Education:   Results of above outlined education: Verbalizes understanding      PLAN                                                                                                                           Suggestions for next session as indicated: Cupping as needed; Hip hinging; Bilateral lower extremity strengthening; Core stabilization.         Therapy procedure time and total treatment time can be found documented on the Time Entry flowsheet   with patient

## 2022-11-15 ENCOUNTER — APPOINTMENT (OUTPATIENT)
Dept: ELECTROPHYSIOLOGY | Facility: CLINIC | Age: 77
End: 2022-11-15

## 2022-11-16 ENCOUNTER — NON-APPOINTMENT (OUTPATIENT)
Age: 77
End: 2022-11-16

## 2022-11-16 PROCEDURE — 93296 REM INTERROG EVL PM/IDS: CPT

## 2022-11-16 PROCEDURE — 93294 REM INTERROG EVL PM/LDLS PM: CPT

## 2023-02-16 ENCOUNTER — NON-APPOINTMENT (OUTPATIENT)
Age: 78
End: 2023-02-16

## 2023-02-16 ENCOUNTER — APPOINTMENT (OUTPATIENT)
Dept: ELECTROPHYSIOLOGY | Facility: CLINIC | Age: 78
End: 2023-02-16
Payer: MEDICARE

## 2023-02-16 VITALS
SYSTOLIC BLOOD PRESSURE: 110 MMHG | DIASTOLIC BLOOD PRESSURE: 60 MMHG | BODY MASS INDEX: 40.32 KG/M2 | WEIGHT: 266 LBS | HEART RATE: 89 BPM | HEIGHT: 68 IN | OXYGEN SATURATION: 98 %

## 2023-02-16 DIAGNOSIS — Z95.0 PRESENCE OF CARDIAC PACEMAKER: ICD-10-CM

## 2023-02-16 DIAGNOSIS — I48.0 PAROXYSMAL ATRIAL FIBRILLATION: ICD-10-CM

## 2023-02-16 DIAGNOSIS — I49.5 SICK SINUS SYNDROME: ICD-10-CM

## 2023-02-16 PROCEDURE — 93280 PM DEVICE PROGR EVAL DUAL: CPT

## 2023-02-16 RX ORDER — ATORVASTATIN CALCIUM 40 MG/1
40 TABLET, FILM COATED ORAL
Refills: 0 | Status: ACTIVE | COMMUNITY

## 2023-02-16 RX ORDER — APIXABAN 5 MG/1
5 TABLET, FILM COATED ORAL
Refills: 0 | Status: ACTIVE | COMMUNITY

## 2023-02-16 RX ORDER — DILTIAZEM HYDROCHLORIDE 120 MG/1
120 CAPSULE, EXTENDED RELEASE ORAL
Refills: 0 | Status: ACTIVE | COMMUNITY

## 2023-05-17 ENCOUNTER — APPOINTMENT (OUTPATIENT)
Dept: ELECTROPHYSIOLOGY | Facility: CLINIC | Age: 78
End: 2023-05-17

## 2023-05-29 ENCOUNTER — FORM ENCOUNTER (OUTPATIENT)
Age: 78
End: 2023-05-29

## 2023-06-22 ENCOUNTER — APPOINTMENT (OUTPATIENT)
Dept: ELECTROPHYSIOLOGY | Facility: CLINIC | Age: 78
End: 2023-06-22

## 2023-10-18 NOTE — DISCHARGE NOTE PROVIDER - NSDCMRMEDTOKEN_GEN_ALL_CORE_FT
albuterol 90 mcg/inh inhalation aerosol: 2 puff(s) inhaled every 6 hours, As needed, Shortness of Breath and/or Wheezing  Aspirin Enteric Coated 81 mg oral delayed release tablet: 1 tab(s) orally once a day  Dulera 200 mcg-5 mcg/inh inhalation aerosol: 2 puff(s) inhaled 2 times a day  fenofibrate 48 mg oral tablet: 1 tab(s) orally once a day  ipratropium 42 mcg/inh (0.06%) nasal spray: 2 spray(s) nasal 2 times a day  metoprolol tartrate 25 mg oral tablet: 1.5 tab(s) orally 2 times a day  One A Day Men&#x27;s Complete oral tablet: 1 tab(s) orally once a day  simvastatin 20 mg oral tablet: 1 tab(s) orally once a day (at bedtime)  tamsulosin 0.4 mg oral capsule: 1 cap(s) orally once a day  Tylenol 325 mg oral tablet: 2 tab(s) orally every 4 hours, As Needed   albuterol 90 mcg/inh inhalation aerosol: 2 puff(s) inhaled every 6 hours, As needed, Shortness of Breath and/or Wheezing  Aspirin Enteric Coated 81 mg oral delayed release tablet: 1 tab(s) orally once a day  Dulera 200 mcg-5 mcg/inh inhalation aerosol: 2 puff(s) inhaled 2 times a day  fenofibrate 48 mg oral tablet: 1 tab(s) orally once a day  ipratropium 42 mcg/inh (0.06%) nasal spray: 2 spray(s) nasal 2 times a day  metoprolol tartrate 25 mg oral tablet: 1.5 tab(s) orally 2 times a day  One A Day Men&#x27;s Complete oral tablet: 1 tab(s) orally once a day  simvastatin 20 mg oral tablet: 1 tab(s) orally once a day (at bedtime)  tamsulosin 0.4 mg oral capsule: 1 cap(s) orally once a day  Tylenol 325 mg oral tablet: 2 tab(s) orally every 4 hours, As Needed  warfarin 4 mg oral tablet: 1 tab(s) orally once a day    albuterol 90 mcg/inh inhalation aerosol: 2 puff(s) inhaled every 6 hours, As needed, Shortness of Breath and/or Wheezing  Aspirin Enteric Coated 81 mg oral delayed release tablet: 1 tab(s) orally once a day  Cardizem  mg/24 hours oral capsule, extended release: 1 cap(s) orally once a day   Dulera 200 mcg-5 mcg/inh inhalation aerosol: 2 puff(s) inhaled 2 times a day  fenofibrate 48 mg oral tablet: 1 tab(s) orally once a day  ipratropium 42 mcg/inh (0.06%) nasal spray: 2 spray(s) nasal 2 times a day  metoprolol tartrate 25 mg oral tablet: 1.5 tab(s) orally 2 times a day  One A Day Men&#x27;s Complete oral tablet: 1 tab(s) orally once a day  simvastatin 20 mg oral tablet: 1 tab(s) orally once a day (at bedtime)  tamsulosin 0.4 mg oral capsule: 1 cap(s) orally once a day  Tylenol 325 mg oral tablet: 2 tab(s) orally every 4 hours, As Needed  warfarin 4 mg oral tablet: 1 tab(s) orally once a day    Valtrex Pregnancy And Lactation Text: this medication is Pregnancy Category B and is considered safe during pregnancy. This medication is not directly found in breast milk but it's metabolite acyclovir is present.

## 2023-11-10 ENCOUNTER — INPATIENT (INPATIENT)
Facility: HOSPITAL | Age: 78
LOS: 3 days | Discharge: HOME | End: 2023-11-14
Attending: STUDENT IN AN ORGANIZED HEALTH CARE EDUCATION/TRAINING PROGRAM | Admitting: STUDENT IN AN ORGANIZED HEALTH CARE EDUCATION/TRAINING PROGRAM
Payer: MEDICARE

## 2023-11-10 ENCOUNTER — EMERGENCY (EMERGENCY)
Facility: HOSPITAL | Age: 78
LOS: 0 days | Discharge: ANOTHER HOSPITAL | End: 2023-11-10
Attending: STUDENT IN AN ORGANIZED HEALTH CARE EDUCATION/TRAINING PROGRAM
Payer: MEDICARE

## 2023-11-10 VITALS
HEART RATE: 104 BPM | TEMPERATURE: 97 F | RESPIRATION RATE: 18 BRPM | SYSTOLIC BLOOD PRESSURE: 113 MMHG | DIASTOLIC BLOOD PRESSURE: 80 MMHG | HEIGHT: 68 IN | OXYGEN SATURATION: 96 %

## 2023-11-10 VITALS — HEIGHT: 68 IN | WEIGHT: 279.99 LBS

## 2023-11-10 VITALS
HEART RATE: 74 BPM | TEMPERATURE: 98 F | RESPIRATION RATE: 18 BRPM | DIASTOLIC BLOOD PRESSURE: 84 MMHG | SYSTOLIC BLOOD PRESSURE: 128 MMHG | OXYGEN SATURATION: 98 %

## 2023-11-10 DIAGNOSIS — I25.10 ATHEROSCLEROTIC HEART DISEASE OF NATIVE CORONARY ARTERY WITHOUT ANGINA PECTORIS: ICD-10-CM

## 2023-11-10 DIAGNOSIS — R07.0 PAIN IN THROAT: ICD-10-CM

## 2023-11-10 DIAGNOSIS — J38.4 EDEMA OF LARYNX: ICD-10-CM

## 2023-11-10 DIAGNOSIS — Z79.01 LONG TERM (CURRENT) USE OF ANTICOAGULANTS: ICD-10-CM

## 2023-11-10 DIAGNOSIS — Z95.1 PRESENCE OF AORTOCORONARY BYPASS GRAFT: ICD-10-CM

## 2023-11-10 DIAGNOSIS — J45.909 UNSPECIFIED ASTHMA, UNCOMPLICATED: ICD-10-CM

## 2023-11-10 DIAGNOSIS — E78.5 HYPERLIPIDEMIA, UNSPECIFIED: ICD-10-CM

## 2023-11-10 DIAGNOSIS — K08.89 OTHER SPECIFIED DISORDERS OF TEETH AND SUPPORTING STRUCTURES: ICD-10-CM

## 2023-11-10 DIAGNOSIS — J38.00 PARALYSIS OF VOCAL CORDS AND LARYNX, UNSPECIFIED: ICD-10-CM

## 2023-11-10 DIAGNOSIS — I48.20 CHRONIC ATRIAL FIBRILLATION, UNSPECIFIED: ICD-10-CM

## 2023-11-10 DIAGNOSIS — I10 ESSENTIAL (PRIMARY) HYPERTENSION: ICD-10-CM

## 2023-11-10 DIAGNOSIS — Z29.9 ENCOUNTER FOR PROPHYLACTIC MEASURES, UNSPECIFIED: ICD-10-CM

## 2023-11-10 DIAGNOSIS — Z87.438 PERSONAL HISTORY OF OTHER DISEASES OF MALE GENITAL ORGANS: ICD-10-CM

## 2023-11-10 DIAGNOSIS — B37.0 CANDIDAL STOMATITIS: ICD-10-CM

## 2023-11-10 DIAGNOSIS — R13.10 DYSPHAGIA, UNSPECIFIED: ICD-10-CM

## 2023-11-10 DIAGNOSIS — Z95.1 PRESENCE OF AORTOCORONARY BYPASS GRAFT: Chronic | ICD-10-CM

## 2023-11-10 DIAGNOSIS — Z85.038 PERSONAL HISTORY OF OTHER MALIGNANT NEOPLASM OF LARGE INTESTINE: ICD-10-CM

## 2023-11-10 DIAGNOSIS — Z85.21 PERSONAL HISTORY OF MALIGNANT NEOPLASM OF LARYNX: ICD-10-CM

## 2023-11-10 DIAGNOSIS — Z79.82 LONG TERM (CURRENT) USE OF ASPIRIN: ICD-10-CM

## 2023-11-10 DIAGNOSIS — I48.91 UNSPECIFIED ATRIAL FIBRILLATION: ICD-10-CM

## 2023-11-10 LAB
ALBUMIN SERPL ELPH-MCNC: 3.1 G/DL — LOW (ref 3.3–5)
ALBUMIN SERPL ELPH-MCNC: 3.1 G/DL — LOW (ref 3.3–5)
ALBUMIN SERPL ELPH-MCNC: 3.8 G/DL — SIGNIFICANT CHANGE UP (ref 3.3–5)
ALBUMIN SERPL ELPH-MCNC: 3.8 G/DL — SIGNIFICANT CHANGE UP (ref 3.3–5)
ALP SERPL-CCNC: 52 U/L — SIGNIFICANT CHANGE UP (ref 40–120)
ALP SERPL-CCNC: 52 U/L — SIGNIFICANT CHANGE UP (ref 40–120)
ALP SERPL-CCNC: 56 U/L — SIGNIFICANT CHANGE UP (ref 40–120)
ALP SERPL-CCNC: 56 U/L — SIGNIFICANT CHANGE UP (ref 40–120)
ALT FLD-CCNC: 12 U/L — SIGNIFICANT CHANGE UP (ref 4–41)
ALT FLD-CCNC: 12 U/L — SIGNIFICANT CHANGE UP (ref 4–41)
ALT FLD-CCNC: 15 U/L — SIGNIFICANT CHANGE UP (ref 12–78)
ALT FLD-CCNC: 15 U/L — SIGNIFICANT CHANGE UP (ref 12–78)
ANION GAP SERPL CALC-SCNC: 17 MMOL/L — HIGH (ref 7–14)
ANION GAP SERPL CALC-SCNC: 17 MMOL/L — HIGH (ref 7–14)
ANION GAP SERPL CALC-SCNC: 6 MMOL/L — SIGNIFICANT CHANGE UP (ref 5–17)
ANION GAP SERPL CALC-SCNC: 6 MMOL/L — SIGNIFICANT CHANGE UP (ref 5–17)
AST SERPL-CCNC: 17 U/L — SIGNIFICANT CHANGE UP (ref 15–37)
AST SERPL-CCNC: 17 U/L — SIGNIFICANT CHANGE UP (ref 15–37)
AST SERPL-CCNC: 24 U/L — SIGNIFICANT CHANGE UP (ref 4–40)
AST SERPL-CCNC: 24 U/L — SIGNIFICANT CHANGE UP (ref 4–40)
BASOPHILS # BLD AUTO: 0.01 K/UL — SIGNIFICANT CHANGE UP (ref 0–0.2)
BASOPHILS # BLD AUTO: 0.01 K/UL — SIGNIFICANT CHANGE UP (ref 0–0.2)
BASOPHILS # BLD AUTO: 0.06 K/UL — SIGNIFICANT CHANGE UP (ref 0–0.2)
BASOPHILS # BLD AUTO: 0.06 K/UL — SIGNIFICANT CHANGE UP (ref 0–0.2)
BASOPHILS NFR BLD AUTO: 0.1 % — SIGNIFICANT CHANGE UP (ref 0–2)
BASOPHILS NFR BLD AUTO: 0.1 % — SIGNIFICANT CHANGE UP (ref 0–2)
BASOPHILS NFR BLD AUTO: 0.6 % — SIGNIFICANT CHANGE UP (ref 0–2)
BASOPHILS NFR BLD AUTO: 0.6 % — SIGNIFICANT CHANGE UP (ref 0–2)
BILIRUB SERPL-MCNC: 0.7 MG/DL — SIGNIFICANT CHANGE UP (ref 0.2–1.2)
BILIRUB SERPL-MCNC: 0.7 MG/DL — SIGNIFICANT CHANGE UP (ref 0.2–1.2)
BILIRUB SERPL-MCNC: 0.8 MG/DL — SIGNIFICANT CHANGE UP (ref 0.2–1.2)
BILIRUB SERPL-MCNC: 0.8 MG/DL — SIGNIFICANT CHANGE UP (ref 0.2–1.2)
BUN SERPL-MCNC: 18 MG/DL — SIGNIFICANT CHANGE UP (ref 7–23)
BUN SERPL-MCNC: 18 MG/DL — SIGNIFICANT CHANGE UP (ref 7–23)
BUN SERPL-MCNC: 21 MG/DL — SIGNIFICANT CHANGE UP (ref 7–23)
BUN SERPL-MCNC: 21 MG/DL — SIGNIFICANT CHANGE UP (ref 7–23)
CALCIUM SERPL-MCNC: 9.2 MG/DL — SIGNIFICANT CHANGE UP (ref 8.5–10.1)
CALCIUM SERPL-MCNC: 9.2 MG/DL — SIGNIFICANT CHANGE UP (ref 8.5–10.1)
CALCIUM SERPL-MCNC: 9.8 MG/DL — SIGNIFICANT CHANGE UP (ref 8.4–10.5)
CALCIUM SERPL-MCNC: 9.8 MG/DL — SIGNIFICANT CHANGE UP (ref 8.4–10.5)
CHLORIDE SERPL-SCNC: 106 MMOL/L — SIGNIFICANT CHANGE UP (ref 96–108)
CHLORIDE SERPL-SCNC: 106 MMOL/L — SIGNIFICANT CHANGE UP (ref 96–108)
CHLORIDE SERPL-SCNC: 99 MMOL/L — SIGNIFICANT CHANGE UP (ref 98–107)
CHLORIDE SERPL-SCNC: 99 MMOL/L — SIGNIFICANT CHANGE UP (ref 98–107)
CO2 SERPL-SCNC: 20 MMOL/L — LOW (ref 22–31)
CO2 SERPL-SCNC: 20 MMOL/L — LOW (ref 22–31)
CO2 SERPL-SCNC: 29 MMOL/L — SIGNIFICANT CHANGE UP (ref 22–31)
CO2 SERPL-SCNC: 29 MMOL/L — SIGNIFICANT CHANGE UP (ref 22–31)
CREAT SERPL-MCNC: 1.28 MG/DL — SIGNIFICANT CHANGE UP (ref 0.5–1.3)
CREAT SERPL-MCNC: 1.28 MG/DL — SIGNIFICANT CHANGE UP (ref 0.5–1.3)
CREAT SERPL-MCNC: 1.29 MG/DL — SIGNIFICANT CHANGE UP (ref 0.5–1.3)
CREAT SERPL-MCNC: 1.29 MG/DL — SIGNIFICANT CHANGE UP (ref 0.5–1.3)
EGFR: 56 ML/MIN/1.73M2 — LOW
EGFR: 56 ML/MIN/1.73M2 — LOW
EGFR: 57 ML/MIN/1.73M2 — LOW
EGFR: 57 ML/MIN/1.73M2 — LOW
EOSINOPHIL # BLD AUTO: 0 K/UL — SIGNIFICANT CHANGE UP (ref 0–0.5)
EOSINOPHIL # BLD AUTO: 0 K/UL — SIGNIFICANT CHANGE UP (ref 0–0.5)
EOSINOPHIL # BLD AUTO: 0.09 K/UL — SIGNIFICANT CHANGE UP (ref 0–0.5)
EOSINOPHIL # BLD AUTO: 0.09 K/UL — SIGNIFICANT CHANGE UP (ref 0–0.5)
EOSINOPHIL NFR BLD AUTO: 0 % — SIGNIFICANT CHANGE UP (ref 0–6)
EOSINOPHIL NFR BLD AUTO: 0 % — SIGNIFICANT CHANGE UP (ref 0–6)
EOSINOPHIL NFR BLD AUTO: 0.9 % — SIGNIFICANT CHANGE UP (ref 0–6)
EOSINOPHIL NFR BLD AUTO: 0.9 % — SIGNIFICANT CHANGE UP (ref 0–6)
GLUCOSE SERPL-MCNC: 138 MG/DL — HIGH (ref 70–99)
GLUCOSE SERPL-MCNC: 138 MG/DL — HIGH (ref 70–99)
GLUCOSE SERPL-MCNC: 91 MG/DL — SIGNIFICANT CHANGE UP (ref 70–99)
GLUCOSE SERPL-MCNC: 91 MG/DL — SIGNIFICANT CHANGE UP (ref 70–99)
HCT VFR BLD CALC: 38.6 % — LOW (ref 39–50)
HCT VFR BLD CALC: 38.6 % — LOW (ref 39–50)
HCT VFR BLD CALC: 40.3 % — SIGNIFICANT CHANGE UP (ref 39–50)
HCT VFR BLD CALC: 40.3 % — SIGNIFICANT CHANGE UP (ref 39–50)
HGB BLD-MCNC: 12.6 G/DL — LOW (ref 13–17)
HGB BLD-MCNC: 12.6 G/DL — LOW (ref 13–17)
HGB BLD-MCNC: 12.9 G/DL — LOW (ref 13–17)
HGB BLD-MCNC: 12.9 G/DL — LOW (ref 13–17)
IANC: 9.5 K/UL — HIGH (ref 1.8–7.4)
IANC: 9.5 K/UL — HIGH (ref 1.8–7.4)
IMM GRANULOCYTES NFR BLD AUTO: 0.2 % — SIGNIFICANT CHANGE UP (ref 0–0.9)
IMM GRANULOCYTES NFR BLD AUTO: 0.2 % — SIGNIFICANT CHANGE UP (ref 0–0.9)
IMM GRANULOCYTES NFR BLD AUTO: 0.3 % — SIGNIFICANT CHANGE UP (ref 0–0.9)
IMM GRANULOCYTES NFR BLD AUTO: 0.3 % — SIGNIFICANT CHANGE UP (ref 0–0.9)
LYMPHOCYTES # BLD AUTO: 0.3 K/UL — LOW (ref 1–3.3)
LYMPHOCYTES # BLD AUTO: 0.3 K/UL — LOW (ref 1–3.3)
LYMPHOCYTES # BLD AUTO: 1.04 K/UL — SIGNIFICANT CHANGE UP (ref 1–3.3)
LYMPHOCYTES # BLD AUTO: 1.04 K/UL — SIGNIFICANT CHANGE UP (ref 1–3.3)
LYMPHOCYTES # BLD AUTO: 10.8 % — LOW (ref 13–44)
LYMPHOCYTES # BLD AUTO: 10.8 % — LOW (ref 13–44)
LYMPHOCYTES # BLD AUTO: 3 % — LOW (ref 13–44)
LYMPHOCYTES # BLD AUTO: 3 % — LOW (ref 13–44)
MAGNESIUM SERPL-MCNC: 2.1 MG/DL — SIGNIFICANT CHANGE UP (ref 1.6–2.6)
MAGNESIUM SERPL-MCNC: 2.1 MG/DL — SIGNIFICANT CHANGE UP (ref 1.6–2.6)
MCHC RBC-ENTMCNC: 29.6 PG — SIGNIFICANT CHANGE UP (ref 27–34)
MCHC RBC-ENTMCNC: 29.6 PG — SIGNIFICANT CHANGE UP (ref 27–34)
MCHC RBC-ENTMCNC: 30.2 PG — SIGNIFICANT CHANGE UP (ref 27–34)
MCHC RBC-ENTMCNC: 30.2 PG — SIGNIFICANT CHANGE UP (ref 27–34)
MCHC RBC-ENTMCNC: 32 GM/DL — SIGNIFICANT CHANGE UP (ref 32–36)
MCHC RBC-ENTMCNC: 32 GM/DL — SIGNIFICANT CHANGE UP (ref 32–36)
MCHC RBC-ENTMCNC: 32.6 GM/DL — SIGNIFICANT CHANGE UP (ref 32–36)
MCHC RBC-ENTMCNC: 32.6 GM/DL — SIGNIFICANT CHANGE UP (ref 32–36)
MCV RBC AUTO: 92.4 FL — SIGNIFICANT CHANGE UP (ref 80–100)
MCV RBC AUTO: 92.4 FL — SIGNIFICANT CHANGE UP (ref 80–100)
MCV RBC AUTO: 92.6 FL — SIGNIFICANT CHANGE UP (ref 80–100)
MCV RBC AUTO: 92.6 FL — SIGNIFICANT CHANGE UP (ref 80–100)
MONOCYTES # BLD AUTO: 0.04 K/UL — SIGNIFICANT CHANGE UP (ref 0–0.9)
MONOCYTES # BLD AUTO: 0.04 K/UL — SIGNIFICANT CHANGE UP (ref 0–0.9)
MONOCYTES # BLD AUTO: 0.79 K/UL — SIGNIFICANT CHANGE UP (ref 0–0.9)
MONOCYTES # BLD AUTO: 0.79 K/UL — SIGNIFICANT CHANGE UP (ref 0–0.9)
MONOCYTES NFR BLD AUTO: 0.4 % — LOW (ref 2–14)
MONOCYTES NFR BLD AUTO: 0.4 % — LOW (ref 2–14)
MONOCYTES NFR BLD AUTO: 8.2 % — SIGNIFICANT CHANGE UP (ref 2–14)
MONOCYTES NFR BLD AUTO: 8.2 % — SIGNIFICANT CHANGE UP (ref 2–14)
NEUTROPHILS # BLD AUTO: 7.64 K/UL — HIGH (ref 1.8–7.4)
NEUTROPHILS # BLD AUTO: 7.64 K/UL — HIGH (ref 1.8–7.4)
NEUTROPHILS # BLD AUTO: 9.5 K/UL — HIGH (ref 1.8–7.4)
NEUTROPHILS # BLD AUTO: 9.5 K/UL — HIGH (ref 1.8–7.4)
NEUTROPHILS NFR BLD AUTO: 79.3 % — HIGH (ref 43–77)
NEUTROPHILS NFR BLD AUTO: 79.3 % — HIGH (ref 43–77)
NEUTROPHILS NFR BLD AUTO: 96.2 % — HIGH (ref 43–77)
NEUTROPHILS NFR BLD AUTO: 96.2 % — HIGH (ref 43–77)
NRBC # BLD: 0 /100 WBCS — SIGNIFICANT CHANGE UP (ref 0–0)
NRBC # BLD: 0 /100 WBCS — SIGNIFICANT CHANGE UP (ref 0–0)
NRBC # FLD: 0 K/UL — SIGNIFICANT CHANGE UP (ref 0–0)
NRBC # FLD: 0 K/UL — SIGNIFICANT CHANGE UP (ref 0–0)
PHOSPHATE SERPL-MCNC: 3.7 MG/DL — SIGNIFICANT CHANGE UP (ref 2.5–4.5)
PHOSPHATE SERPL-MCNC: 3.7 MG/DL — SIGNIFICANT CHANGE UP (ref 2.5–4.5)
PLATELET # BLD AUTO: 250 K/UL — SIGNIFICANT CHANGE UP (ref 150–400)
PLATELET # BLD AUTO: 250 K/UL — SIGNIFICANT CHANGE UP (ref 150–400)
PLATELET # BLD AUTO: 280 K/UL — SIGNIFICANT CHANGE UP (ref 150–400)
PLATELET # BLD AUTO: 280 K/UL — SIGNIFICANT CHANGE UP (ref 150–400)
POTASSIUM SERPL-MCNC: 4.4 MMOL/L — SIGNIFICANT CHANGE UP (ref 3.5–5.3)
POTASSIUM SERPL-MCNC: 4.4 MMOL/L — SIGNIFICANT CHANGE UP (ref 3.5–5.3)
POTASSIUM SERPL-MCNC: 4.6 MMOL/L — SIGNIFICANT CHANGE UP (ref 3.5–5.3)
POTASSIUM SERPL-MCNC: 4.6 MMOL/L — SIGNIFICANT CHANGE UP (ref 3.5–5.3)
POTASSIUM SERPL-SCNC: 4.4 MMOL/L — SIGNIFICANT CHANGE UP (ref 3.5–5.3)
POTASSIUM SERPL-SCNC: 4.4 MMOL/L — SIGNIFICANT CHANGE UP (ref 3.5–5.3)
POTASSIUM SERPL-SCNC: 4.6 MMOL/L — SIGNIFICANT CHANGE UP (ref 3.5–5.3)
POTASSIUM SERPL-SCNC: 4.6 MMOL/L — SIGNIFICANT CHANGE UP (ref 3.5–5.3)
PROT SERPL-MCNC: 6.5 GM/DL — SIGNIFICANT CHANGE UP (ref 6–8.3)
PROT SERPL-MCNC: 6.5 GM/DL — SIGNIFICANT CHANGE UP (ref 6–8.3)
PROT SERPL-MCNC: 6.8 G/DL — SIGNIFICANT CHANGE UP (ref 6–8.3)
PROT SERPL-MCNC: 6.8 G/DL — SIGNIFICANT CHANGE UP (ref 6–8.3)
RBC # BLD: 4.17 M/UL — LOW (ref 4.2–5.8)
RBC # BLD: 4.17 M/UL — LOW (ref 4.2–5.8)
RBC # BLD: 4.36 M/UL — SIGNIFICANT CHANGE UP (ref 4.2–5.8)
RBC # BLD: 4.36 M/UL — SIGNIFICANT CHANGE UP (ref 4.2–5.8)
RBC # FLD: 15 % — HIGH (ref 10.3–14.5)
RBC # FLD: 15 % — HIGH (ref 10.3–14.5)
RBC # FLD: 15.2 % — HIGH (ref 10.3–14.5)
RBC # FLD: 15.2 % — HIGH (ref 10.3–14.5)
SODIUM SERPL-SCNC: 136 MMOL/L — SIGNIFICANT CHANGE UP (ref 135–145)
SODIUM SERPL-SCNC: 136 MMOL/L — SIGNIFICANT CHANGE UP (ref 135–145)
SODIUM SERPL-SCNC: 141 MMOL/L — SIGNIFICANT CHANGE UP (ref 135–145)
SODIUM SERPL-SCNC: 141 MMOL/L — SIGNIFICANT CHANGE UP (ref 135–145)
WBC # BLD: 9.64 K/UL — SIGNIFICANT CHANGE UP (ref 3.8–10.5)
WBC # BLD: 9.64 K/UL — SIGNIFICANT CHANGE UP (ref 3.8–10.5)
WBC # BLD: 9.88 K/UL — SIGNIFICANT CHANGE UP (ref 3.8–10.5)
WBC # BLD: 9.88 K/UL — SIGNIFICANT CHANGE UP (ref 3.8–10.5)
WBC # FLD AUTO: 9.64 K/UL — SIGNIFICANT CHANGE UP (ref 3.8–10.5)
WBC # FLD AUTO: 9.64 K/UL — SIGNIFICANT CHANGE UP (ref 3.8–10.5)
WBC # FLD AUTO: 9.88 K/UL — SIGNIFICANT CHANGE UP (ref 3.8–10.5)
WBC # FLD AUTO: 9.88 K/UL — SIGNIFICANT CHANGE UP (ref 3.8–10.5)

## 2023-11-10 PROCEDURE — 99497 ADVNCD CARE PLAN 30 MIN: CPT | Mod: 25

## 2023-11-10 PROCEDURE — G1004: CPT

## 2023-11-10 PROCEDURE — 99285 EMERGENCY DEPT VISIT HI MDM: CPT

## 2023-11-10 PROCEDURE — 93005 ELECTROCARDIOGRAM TRACING: CPT

## 2023-11-10 PROCEDURE — 99285 EMERGENCY DEPT VISIT HI MDM: CPT | Mod: 25

## 2023-11-10 PROCEDURE — 96374 THER/PROPH/DIAG INJ IV PUSH: CPT | Mod: XU

## 2023-11-10 PROCEDURE — 94640 AIRWAY INHALATION TREATMENT: CPT

## 2023-11-10 PROCEDURE — 70491 CT SOFT TISSUE NECK W/DYE: CPT | Mod: MG

## 2023-11-10 PROCEDURE — 80053 COMPREHEN METABOLIC PANEL: CPT

## 2023-11-10 PROCEDURE — 36415 COLL VENOUS BLD VENIPUNCTURE: CPT

## 2023-11-10 PROCEDURE — 71260 CT THORAX DX C+: CPT | Mod: MG

## 2023-11-10 PROCEDURE — 70491 CT SOFT TISSUE NECK W/DYE: CPT | Mod: 26,MG

## 2023-11-10 PROCEDURE — 71260 CT THORAX DX C+: CPT | Mod: 26,MG

## 2023-11-10 PROCEDURE — 99223 1ST HOSP IP/OBS HIGH 75: CPT | Mod: GC

## 2023-11-10 PROCEDURE — 85025 COMPLETE CBC W/AUTO DIFF WBC: CPT

## 2023-11-10 PROCEDURE — 93010 ELECTROCARDIOGRAM REPORT: CPT

## 2023-11-10 RX ORDER — ATORVASTATIN CALCIUM 80 MG/1
40 TABLET, FILM COATED ORAL AT BEDTIME
Refills: 0 | Status: DISCONTINUED | OUTPATIENT
Start: 2023-11-10 | End: 2023-11-11

## 2023-11-10 RX ORDER — DEXAMETHASONE 0.5 MG/5ML
10 ELIXIR ORAL ONCE
Refills: 0 | Status: DISCONTINUED | OUTPATIENT
Start: 2023-11-10 | End: 2023-11-10

## 2023-11-10 RX ORDER — TAMSULOSIN HYDROCHLORIDE 0.4 MG/1
1 CAPSULE ORAL
Refills: 0 | DISCHARGE

## 2023-11-10 RX ORDER — ACETAMINOPHEN 500 MG
650 TABLET ORAL EVERY 6 HOURS
Refills: 0 | Status: DISCONTINUED | OUTPATIENT
Start: 2023-11-10 | End: 2023-11-11

## 2023-11-10 RX ORDER — ACETAMINOPHEN 500 MG
2 TABLET ORAL
Qty: 0 | Refills: 0 | DISCHARGE

## 2023-11-10 RX ORDER — DEXAMETHASONE 0.5 MG/5ML
10 ELIXIR ORAL EVERY 8 HOURS
Refills: 0 | Status: DISCONTINUED | OUTPATIENT
Start: 2023-11-11 | End: 2023-11-14

## 2023-11-10 RX ORDER — FUROSEMIDE 40 MG
1 TABLET ORAL
Refills: 0 | DISCHARGE

## 2023-11-10 RX ORDER — SIMVASTATIN 20 MG/1
1 TABLET, FILM COATED ORAL
Qty: 0 | Refills: 0 | DISCHARGE

## 2023-11-10 RX ORDER — TAMSULOSIN HYDROCHLORIDE 0.4 MG/1
1 CAPSULE ORAL
Qty: 0 | Refills: 0 | DISCHARGE

## 2023-11-10 RX ORDER — APIXABAN 2.5 MG/1
5 TABLET, FILM COATED ORAL
Refills: 0 | Status: DISCONTINUED | OUTPATIENT
Start: 2023-11-10 | End: 2023-11-11

## 2023-11-10 RX ORDER — IPRATROPIUM BROMIDE 21 MCG
2 AEROSOL, SPRAY (ML) NASAL
Qty: 0 | Refills: 0 | DISCHARGE

## 2023-11-10 RX ORDER — TAMSULOSIN HYDROCHLORIDE 0.4 MG/1
0.4 CAPSULE ORAL AT BEDTIME
Refills: 0 | Status: DISCONTINUED | OUTPATIENT
Start: 2023-11-10 | End: 2023-11-11

## 2023-11-10 RX ORDER — MULTIVIT-MIN/FERROUS GLUCONATE 9 MG/15 ML
1 LIQUID (ML) ORAL
Qty: 0 | Refills: 0 | DISCHARGE

## 2023-11-10 RX ORDER — DEXAMETHASONE 0.5 MG/5ML
2 ELIXIR ORAL ONCE
Refills: 0 | Status: COMPLETED | OUTPATIENT
Start: 2023-11-10 | End: 2023-11-10

## 2023-11-10 RX ORDER — ASPIRIN/CALCIUM CARB/MAGNESIUM 324 MG
1 TABLET ORAL
Qty: 0 | Refills: 0 | DISCHARGE

## 2023-11-10 RX ORDER — DEXAMETHASONE 0.5 MG/5ML
8 ELIXIR ORAL EVERY 8 HOURS
Refills: 0 | Status: DISCONTINUED | OUTPATIENT
Start: 2023-11-10 | End: 2023-11-10

## 2023-11-10 RX ORDER — BUDESONIDE AND FORMOTEROL FUMARATE DIHYDRATE 160; 4.5 UG/1; UG/1
2 AEROSOL RESPIRATORY (INHALATION)
Refills: 0 | Status: DISCONTINUED | OUTPATIENT
Start: 2023-11-10 | End: 2023-11-14

## 2023-11-10 RX ORDER — APIXABAN 2.5 MG/1
1 TABLET, FILM COATED ORAL
Refills: 0 | DISCHARGE

## 2023-11-10 RX ORDER — AMPICILLIN SODIUM AND SULBACTAM SODIUM 250; 125 MG/ML; MG/ML
3 INJECTION, POWDER, FOR SUSPENSION INTRAMUSCULAR; INTRAVENOUS ONCE
Refills: 0 | Status: COMPLETED | OUTPATIENT
Start: 2023-11-10 | End: 2023-11-10

## 2023-11-10 RX ORDER — IPRATROPIUM/ALBUTEROL SULFATE 18-103MCG
3 AEROSOL WITH ADAPTER (GRAM) INHALATION ONCE
Refills: 0 | Status: COMPLETED | OUTPATIENT
Start: 2023-11-10 | End: 2023-11-10

## 2023-11-10 RX ORDER — ASPIRIN/CALCIUM CARB/MAGNESIUM 324 MG
1 TABLET ORAL
Refills: 0 | DISCHARGE

## 2023-11-10 RX ORDER — ATORVASTATIN CALCIUM 80 MG/1
1 TABLET, FILM COATED ORAL
Refills: 0 | DISCHARGE

## 2023-11-10 RX ORDER — FENOFIBRATE,MICRONIZED 130 MG
1 CAPSULE ORAL
Refills: 0 | DISCHARGE

## 2023-11-10 RX ORDER — LANOLIN ALCOHOL/MO/W.PET/CERES
3 CREAM (GRAM) TOPICAL AT BEDTIME
Refills: 0 | Status: DISCONTINUED | OUTPATIENT
Start: 2023-11-10 | End: 2023-11-14

## 2023-11-10 RX ORDER — MOMETASONE FUROATE AND FORMOTEROL FUMARATE DIHYDRATE 200; 5 UG/1; UG/1
2 AEROSOL RESPIRATORY (INHALATION)
Qty: 0 | Refills: 0 | DISCHARGE

## 2023-11-10 RX ORDER — ASPIRIN/CALCIUM CARB/MAGNESIUM 324 MG
81 TABLET ORAL DAILY
Refills: 0 | Status: DISCONTINUED | OUTPATIENT
Start: 2023-11-10 | End: 2023-11-11

## 2023-11-10 RX ORDER — MOMETASONE FUROATE AND FORMOTEROL FUMARATE DIHYDRATE 200; 5 UG/1; UG/1
2 AEROSOL RESPIRATORY (INHALATION)
Refills: 0 | DISCHARGE

## 2023-11-10 RX ORDER — FLUCONAZOLE 150 MG/1
TABLET ORAL
Refills: 0 | Status: DISCONTINUED | OUTPATIENT
Start: 2023-11-11 | End: 2023-11-13

## 2023-11-10 RX ORDER — FLUTICASONE PROPIONATE 50 MCG
1 SPRAY, SUSPENSION NASAL
Refills: 0 | DISCHARGE

## 2023-11-10 RX ORDER — METOPROLOL TARTRATE 50 MG
50 TABLET ORAL
Refills: 0 | Status: DISCONTINUED | OUTPATIENT
Start: 2023-11-10 | End: 2023-11-11

## 2023-11-10 RX ORDER — FENOFIBRATE,MICRONIZED 130 MG
1 CAPSULE ORAL
Qty: 0 | Refills: 0 | DISCHARGE

## 2023-11-10 RX ORDER — SODIUM CHLORIDE 9 MG/ML
500 INJECTION INTRAMUSCULAR; INTRAVENOUS; SUBCUTANEOUS ONCE
Refills: 0 | Status: COMPLETED | OUTPATIENT
Start: 2023-11-10 | End: 2023-11-10

## 2023-11-10 RX ORDER — METOPROLOL TARTRATE 50 MG
1.5 TABLET ORAL
Qty: 0 | Refills: 0 | DISCHARGE

## 2023-11-10 RX ADMIN — AMPICILLIN SODIUM AND SULBACTAM SODIUM 200 GRAM(S): 250; 125 INJECTION, POWDER, FOR SUSPENSION INTRAMUSCULAR; INTRAVENOUS at 19:37

## 2023-11-10 RX ADMIN — Medication 3 MILLILITER(S): at 11:05

## 2023-11-10 RX ADMIN — SODIUM CHLORIDE 500 MILLILITER(S): 9 INJECTION INTRAMUSCULAR; INTRAVENOUS; SUBCUTANEOUS at 18:57

## 2023-11-10 RX ADMIN — Medication 2 MILLIGRAM(S): at 20:59

## 2023-11-10 RX ADMIN — Medication 101.6 MILLIGRAM(S): at 18:55

## 2023-11-10 RX ADMIN — Medication 125 MILLIGRAM(S): at 11:05

## 2023-11-10 NOTE — CONSULT NOTE ADULT - PROBLEM SELECTOR RECOMMENDATION 9
- Recommend monitored unit with - continuos pulse ox  - Recommend continued infectious work up - RVP - for possible upper airway infection   - Recommend Unasyn   - Recommend Decadron 10mg q8hrs x24 hours   - ENT to continue to monitor - Please PAGE [phone]// call [phone] if any changes in respiratory status, changes in voice

## 2023-11-10 NOTE — ED PROVIDER NOTE - PROGRESS NOTE DETAILS
Patient with narrowing of aryepligottic fold b/l. Patient transferred to [facility] for ENT eval in stable condition. Not in resp distress. Wheezing improved after nebulizer tx.

## 2023-11-10 NOTE — ED PROVIDER NOTE - OBJECTIVE STATEMENT
79 y/o male with a PMHx of asthma, Afib, CAD s/p CABG x3, colon cancer, HLD, HTN, mitral regurgitation, vocal cord cancer presents to the ED c/o throat pain and difficulty swallowing. Pt reports he has had a tooth pain x1 week. Pt was diagnosed with a gum infection 3 days ago and was placed on Penicillin. Pt reports throat pain and difficulty swallowing secondary to pain. Denies SOB. NKDA. No other complaints at this time. 79 y/o male with a PMHx of asthma, Afib, CAD s/p CABG x3, colon cancer, HLD, HTN, mitral regurgitation, vocal cord cancer presents to the ED c/o throat pain and difficulty swallowing. Pt reports he has had a tooth pain x1 week. Pt was diagnosed with a gum infection 3 days ago and was placed on Penicillin. Pt reports throat pain and difficulty swallowing secondary to pain. Pt also reports worsening wheezing over the last few months. NKDA. No other complaints at this time. 77 y/o male with a PMHx of asthma, Afib, CAD s/p CABG x3, colon cancer, HLD, HTN, mitral regurgitation, vocal cord cancer presents to the ED c/o throat pain and difficulty swallowing. Pt reports he has had a tooth pain x1 week. Pt was diagnosed with a gum infection 3 days ago and was placed on Penicillin. Pt reports throat pain and difficulty swallowing secondary to pain. Pt also reports worsening wheezing over the last few months. NKDA. No other complaints at this time. ENT: Dr. Collazo.

## 2023-11-10 NOTE — ED ADULT TRIAGE NOTE - CHIEF COMPLAINT QUOTE
Patient transferred from [facility] for ENT consult. Patient a&ox4. Breathing non-labored. PMHX- Asthma, Colon CA and throat CA on remission, HTN, A fib on elliquis. Breathing non-labored. Patient transferred from [facility] for ENT consult. Patient admitted to [place] for throat swelling. Patient a&ox4. Breathing non-labored. PMHX- Asthma, Colon CA and throat CA on remission, HTN, A fib on elliquis. Breathing non-labored. Denies CP, no SOB noted.

## 2023-11-10 NOTE — H&P ADULT - ASSESSMENT
78M w/PMH Afib on eliquis, PPM, HTN, CAD/CABG, MR s/p MVR, asthma, h/o vocal cord ca (dx 2015) c/b prior thrush, colon ca s/p curative resection (2/22) presenting from home with c/o progressive throat pain w/ notable vocal fold edema and overlying white plaque/nodule

## 2023-11-10 NOTE — ED PROVIDER NOTE - PROGRESS NOTE DETAILS
Evaluated by ENT, vocal cord edema as well as candida noted. Will start martin mon.  -Fortunato Garzon PGY-2

## 2023-11-10 NOTE — ED PROVIDER NOTE - OBJECTIVE STATEMENT
78-year-old PMHx asthma, Afib, CAD s/p CABG x3, colon cancer, HLD, HTN, mitral regurgitation, vocal cord cancer presents as transfer from [place] for hoarseness and difficulty swallowing. States since having OSCAR earlier this year developed hoarsness, noted worsening swallowing and can no longer tolerate liquids. Was found to have AE cord edema on outside CT, transferred for ENT eval. Denies fevers, vision changes, chest pain, numbness, weakness.

## 2023-11-10 NOTE — ED ADULT NURSE NOTE - CHIEF COMPLAINT QUOTE
Patient transferred from [facility] for ENT consult. Patient a&ox4. Breathing non-labored. PMHX- Asthma, Colon CA and throat CA on remission, HTN, A fib on elliquis.

## 2023-11-10 NOTE — ED PROVIDER NOTE - CHIEF COMPLAINT
The patient is a 78y Male complaining of throat, pain.
General: WN/WD NAD  HEENT: PERRLA, EOMI, moist mucous membranes  Neurology: A&Ox3 but confused about recent history, nonfocal, HANSEN x 4  Respiratory: CTA B/L, normal respiratory effort, no wheezes, crackles, rales  CV: Reg rhythm, tachycardic, S1S2, no murmurs, rubs or gallops  Abdominal: Soft, NT, ND +BS, Last BM  Extremities: No edema, + peripheral pulses  Incisions: none  Tubes: none

## 2023-11-10 NOTE — H&P ADULT - PROBLEM SELECTOR PLAN 1
Vocal cord palsy with overlying white nodular plaque   On imaging with fiberoptic laryngoscopy there are white nodular lesions on a paralyzed true vocal fold on his left vocal cord. Right true vocal fold with minimal adduction. Mild edema was visualized on both arytenoids. With a grossly patent airway. Patient currently endorses that he cannot tolerate oral liquids. CT scan with aryepiglottic edema.    - continuos pulse ox  - Continue NPO  - Unasyn   - Decadron 10mg q8hrs x24 hours   - F/u ENT reccs Vocal cord palsy with overlying white nodular plaque   On imaging with fiberoptic laryngoscopy there are white nodular lesions on a paralyzed true vocal fold on his left vocal cord. Right true vocal fold with minimal adduction. Mild edema was visualized on both arytenoids. With a grossly patent airway. Patient currently endorses that he cannot tolerate oral liquids. CT scan with aryepiglottic edema.    - continuos pulse ox  - Continue NPO  - Unasyn + Fluconazole  - Decadron 10mg q8hrs x24 hours   - F/u ENT reccs

## 2023-11-10 NOTE — ED PROVIDER NOTE - ENMT, MLM
Airway patent, Nasal mucosa clear. Mouth with normal mucosa. Throat has no vesicles, no oropharyngeal exudates and uvula is midline. Nasal mucosa clear. Mouth with normal mucosa. Throat has no vesicles, no oropharyngeal exudates and uvula is midline.

## 2023-11-10 NOTE — H&P ADULT - PROBLEM SELECTOR PLAN 9
Activity: Ambulate as tolerated  Bowel reg:   Consultants: ENT  Diet: NPO  DVT ppx: AC  Dispo: pending clinical improvement  Directive: Full code - MOLST left with patient to make decision on 11/11 AM  Electrolytes: (goal Mg, Phos, K: 2, 3, 4)  Family: Wife  Gtts: Judicious IVFs  Hardware: Hold tamsulosin iso pill intolerance  Restart when able to take PO

## 2023-11-10 NOTE — H&P ADULT - HISTORY OF PRESENT ILLNESS
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 Per discussion, patient received OSCAR ablation on 8/15/23 and his voice has never been the same. He continued to live with this until over the last few weeks when he began to feel like he was having pain with swallowing. He has progressively had to loosen the consistency of food from solid to liquid, to most recently no oral intake over the last day. In may of this past year he had a biopsy of a vocal cord lesion which was positive for thrush and was started on Diflucan. He had prior episode of thrush in 2022 as well. States he had a tooth infection on (bottom right side of mouth) with swelling last week, but now feels like he cannot swallow due to severe pain. Due to his tooth pain he went to his dentist 3 days ago and was diagnosed with a gum infection and started on penicillin. Due to worsening of his throat pain he presented to  and was told he should have ENT evaluation and was thereby transferred to [facility] for formal evaluation. On imaging with fiberoptic laryngoscopy there are white nodular lesions on a paralyzed true vocal fold on his left vocal cord. Right true vocal fold with minimal adduction. Mild edema was visualized on both arytenoids. With a grossly patent airway. Patient currently endorses that he cannot tolerate oral liquids. CT scan with aryepiglottic edema. Secondary to not eating he has stopped taking his home lasix dose over the last few days.   He denies any chest pain, denies fevers at home. Denies any symptoms that he typically feels with CHF or asthma. Denies any itchiness.      ENT: Dr. Collazo      In ED VSS notable tachycardia with AFib + PVCs on EKG. Rcvd 500c NS bolus. Decadron 10 q8 + Unasyn started.

## 2023-11-10 NOTE — ED ADULT NURSE NOTE - NSFALLUNIVINTERV_ED_ALL_ED
Bed/Stretcher in lowest position, wheels locked, appropriate side rails in place/Call bell, personal items and telephone in reach/Instruct patient to call for assistance before getting out of bed/chair/stretcher/Non-slip footwear applied when patient is off stretcher/[place] to call system/Physically safe environment - no spills, clutter or unnecessary equipment/Purposeful proactive rounding/Room/bathroom lighting operational, light cord in reach

## 2023-11-10 NOTE — H&P ADULT - PROBLEM SELECTOR PLAN 2
Patient had drank water without evidence of aspiration however largely unable to tolerate 2.2 pain  Likely all iso vocal cord edema    - S+S consult   - NPO Has prior thrush, recent biopsy of lesion with evidence of candida    Treat with fluconazole 400 qD

## 2023-11-10 NOTE — H&P ADULT - NSHPPHYSICALEXAM_GEN_ALL_CORE
Vital Signs Last 24 Hrs  T(C): 36.6 (10 Nov 2023 17:55), Max: 37 (10 Nov 2023 13:45)  T(F): 97.9 (10 Nov 2023 17:55), Max: 98.6 (10 Nov 2023 13:45)  HR: 64 (10 Nov 2023 21:39) (64 - 104)  BP: 127/78 (10 Nov 2023 21:39) (113/80 - 167/71)  BP(mean): 94 (10 Nov 2023 09:13) (94 - 94)  RR: 18 (10 Nov 2023 21:39) (18 - 19)  SpO2: 96% (10 Nov 2023 21:39) (96% - 98%)    Parameters below as of 10 Nov 2023 21:39  Patient On (Oxygen Delivery Method): room air        General: WN/WD NAD  Neurology: A&Ox3, nonfocal, CARRASCO x 4  Eyes: PERRLA/ EOMI, Gross vision intact  ENT/Neck: Neck supple, trachea midline, No JVD, Gross hearing intact, non-infected non erythematous appearance of gums, no evidence of overlying tongue plaques  Respiratory: CTA B/L, No wheezing, rales, rhonchi  CV: RRR, +S1/S2, -S3/S4, no murmurs, rubs or gallops  Abdominal: Soft, NT, ND +BS, No HSM  MSK: 5/5 strength UE/LE bilaterally  Extremities: No edema, 2+ peripheral pulses  Skin: No Rashes, Hematoma, Ecchymosis  Incisions:   Tubes:

## 2023-11-10 NOTE — ED PROVIDER NOTE - CLINICAL SUMMARY MEDICAL DECISION MAKING FREE TEXT BOX
78-year-old PMHx asthma, Afib, CAD s/p CABG x3, colon cancer, HLD, HTN, mitral regurgitation, vocal cord cancer presents as transfer from [place] for hoarseness  and difficulty swallowing. States since having OSCAR earlier this year developed hoarseness, noted worsening swallowing and can no longer tolerate liquids. Afebrile, not hypoxic, stridor noted on exam. Airway patent at this time. Will discuss with ENT to eval for cord edema, reassess.

## 2023-11-10 NOTE — H&P ADULT - NSHPREVIEWOFSYSTEMS_GEN_ALL_CORE
REVIEW OF SYSTEMS:  Constitutional: [ ] fevers [ ] chills [ ] weight loss [ ] weight gain  HEENT: [ ] vision problems [ ]  eye pain [ ]  nasal congestion [ ] rhinorrhea [X ] sore throat [ ] dysphagia  CV: [ ] chest pain [ ] orthopnea [ ] palpitations   Resp: [ ] cough, [ ] dyspnea, [ ] wheezing, [ ] hemoptysis  GI: [ ] nausea, [ ] vomiting, [ ] diarrhea, [ ] constipation, [ ] abdominal pain  : [ ] dysuria [ ] nocturia [ ] hematuria [ ] increased urinary frequency  Musculoskeletal: [ ] back pain [ ] myalgias [ ] arthralgias [ ] fracture  Skin: [ ] rash [ ] itch  Neurological: [ ] headache [ ] dizziness [ ] syncope [ ] weakness [ ] numbness  Psychiatric: [ ] anxiety [ ] depression  Endocrine: [ ] diabetes [ ] thyroid problem  Hematologic/Lymphatic: [ ] anemia [ ] bleeding problem  Allergic/Immunologic: [ ] itchy eyes [ ] nasal discharge [ ] hives [ ] angioedema  [ ] All other systems negative  [ ] Unable to assess ROS because ________

## 2023-11-10 NOTE — H&P ADULT - NSHPLABSRESULTS_GEN_ALL_CORE
12.9   9.88  )-----------( 280      ( 10 Nov 2023 18:50 )             40.3   11-10    136  |  99  |  21  ----------------------------<  138<H>  4.6   |  20<L>  |  1.29    Ca    9.8      10 Nov 2023 18:50  Phos  3.7     11-10  Mg     2.10     11-10    TPro  6.8  /  Alb  3.8  /  TBili  0.7  /  DBili  x   /  AST  24  /  ALT  12  /  AlkPhos  56  11-10

## 2023-11-10 NOTE — H&P ADULT - PROBLEM SELECTOR PLAN 3
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

## 2023-11-10 NOTE — ED ADULT TRIAGE NOTE - CHIEF COMPLAINT QUOTE
Pt. presents to ED c/o throat pain. Pt. reports tooth infection with swelling last week, reports now feeling like he cant swallow due to pain. Pt. denies SOB however endorses hx. asthma. Denies CP

## 2023-11-10 NOTE — ED PROVIDER NOTE - PHYSICAL EXAMINATION
GEN: NAD. A&Ox3. Non-toxic appearing.  HEENT: normocephalic, atraumatic, EOMI, PERRL, no scleral icterus, no conjuntival pallor, moist MM  CARDIAC: RRR, S1, S2, no murmur. Radial pulses and DP pulses present and symmetric b/l  PULM: inspiratory stridor  ABD: soft NT, ND, no rebound no guarding  MSK: Moving all extremities, no edema  NEURO: no focal neurological deficits, CN 2-12 grossly intact  SKIN: warm, dry, no rash.

## 2023-11-10 NOTE — H&P ADULT - PROBLEM SELECTOR PLAN 11
Extensively discussed GOC with patient and family at bedside. Patient would like be resuscitated with trial of intubation. He also discussed that he would be ok with feeding tube if necessary and ok with antibiotics and fluids. MOLST form given to patient and encouraged to complete.

## 2023-11-10 NOTE — H&P ADULT - PROBLEM SELECTOR PLAN 10
Activity: Ambulate as tolerated  Bowel reg:   Consultants: ENT  Diet: NPO  DVT ppx: AC  Dispo: pending clinical improvement  Directive: Full code - MOLST left with patient to make decision on 11/11 AM  Electrolytes: (goal Mg, Phos, K: 2, 3, 4)  Family: Wife  Gtts: Judicious IVFs  Hardware:

## 2023-11-10 NOTE — CONSULT NOTE ADULT - ASSESSMENT
78M pmhx asthma afib CAD CABGx3 Colon cancer and history of left vocal cord cancer 2015? (per pt and wife)  treated Dr Collazo (in [place]) with 28 days of RT presented to [place] progressing hoarseness and  with  1 week of difficulty swallowing now with inability to swallow liquids- CT scan showed - b/l AE fold swelling. Physical exam: afebrile, no elevation in WBC, sats %- FOE: True vocal fold - LEFT: + white nodular lesions- paralyzed  - RIGHT: minimal adduction - b/l paramedian position  Airway patent - 4mm

## 2023-11-10 NOTE — H&P ADULT - ATTENDING COMMENTS
Mr. 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 Denies any coughing with liquids but notes difficulty now with even swallowing pills. CT neck showed swelling of the aryepiglottic folds bilaterally with no enhancing collection identified. Laryngoscopy performed by ENT showed right vocal cord with min adduction/in the paramedian position and left vocal cord paresis in the abducted position. He also has been taking 3 days worth/7 day course for a tooth infection per his dentist. CT chest shows calcified pleural plaques suggestive of asbestos-related pleural disease. He also reports he has not taken his Lasix over last few days due to difficulty swallowing pills and also takes Lasix as needed for edema.     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     Plan: FEES with speech/swallow therapist in the AM, NPO for now, decadron 10 gram Q8hr for now, ENT to follow. Will continue Augmentin for tooth infection, if cannot tolerate, can do IV Unasyn. Will also treat likely thrush-white lesions on the left vocal cord with PO fluconazole for now. Rest of plan per above.

## 2023-11-10 NOTE — ED PROVIDER NOTE - CLINICAL SUMMARY MEDICAL DECISION MAKING FREE TEXT BOX
Pt presenting for difficulty swallowing associated with dental infection. Pt also c/o worsening wheezing x months. VSS. No respiratory distress. Plan: labs, CT neck and chest, treat wheezing.

## 2023-11-10 NOTE — ED PROVIDER NOTE - ATTENDING CONTRIBUTION TO CARE
I (Bina) agree with above, I performed a history and physical. Counseled liz medical staff, physician assistant, and/or medical student on medical decision making as documented. Medical decisions and treatment interventions were made in real time during the patient encounter. Additionally and/or with the following exceptions: 78-year-old past medical history of A-fib, CAD with CABG, colon cancer in remission hyperlipidemia, hypertension vocal cord cancer status postradiation is presenting to the emergency department with not tolerating p.o. liquids and drooling.  ENT was already aware of the patient they scoped the patient which shows bilateral arytenoid and epiglottis edema.  Patient's airway was otherwise intact and not requiring airway intervention.  Patient had leukocytosis to 14, CMP unremarkable given the edema in the airway patient was treated with Unasyn and given steroids.  Patient required admission to the hospital.  The patient's condition was not amenable to outpatient treatment due either the lack of feasibility of outpatient care coordination, possibility for further decompensation with adverse outcome if discharge, or treatments and diagnostic  modalities only available during an inpatient hospitalization.

## 2023-11-10 NOTE — ED ADULT NURSE NOTE - PAIN RATING/NUMBER SCALE (0-10): ACTIVITY
0 (no pain/absence of nonverbal indicators of pain)
wean O2 as tolerated  improved with diuresis and drainage of pleural effusions

## 2023-11-10 NOTE — CONSULT NOTE ADULT - SUBJECTIVE AND OBJECTIVE BOX
CC: Swelling b/l AE fold seen on CT scan    HPI: 78M pmhx asthma afib CAD CABGx3 Colon cancer and history of left vocal cord cancer 2015? (per pt and wife)  treated Dr Collazo (in [place]) with 28 days of RT presented to [place] progressing hoarseness and  with  1 week of difficulty swallowing now with inability to swallow liquids. Pt transferred to [facility] for ENT eval. Pt seen and examined at bedside. Pt and wife explained history. It was explained that 8/15/2023 under went TTE procedure and ablation  since this procedure  his voice has never been the same, however over the last few weeks started having issues swallowing, and yesterday was only able to have yogurt. Pt explained follows regularly with ENT Dr. collazo (last appointment was in May) - at that time (per pt) vocal cords were fine, only found ? white lesion on VC that was biopsied in (june/july) the bx results came back as thrust and was treated. Patient explained he has been dealing with tooth ache (bottom right tooth) since 10/31 and was prescribed Augmentin Pt explained has not taken lasix over the last few days (2-3) due to note eating.  Pt endorsed + difficulty swallowing, hoarsens Pt denied SOB.          		    PAST MEDICAL & SURGICAL HISTORY:  Atrial fibrillation      Mitral regurgitation      Hypertension, unspecified type      HLD (hyperlipidemia)      CAD (coronary artery disease)      S/P rotator cuff surgery      S/P CABG x 3        Allergies    No Known Allergies    Intolerances      MEDICATIONS  (STANDING):  ampicillin/sulbactam  IVPB 3 Gram(s) IV Intermittent Once  dexAMETHasone  IVPB 8 milliGRAM(s) IV Intermittent every 8 hours  sodium chloride 0.9% Bolus 500 milliLiter(s) IV Bolus once    MEDICATIONS  (PRN):           Family history: No pertinent family history in first degree relatives         Vital Signs Last 24 Hrs  T(C): 36.6 (10 Nov 2023 17:55), Max: 37 (10 Nov 2023 13:45)  T(F): 97.9 (10 Nov 2023 17:55), Max: 98.6 (10 Nov 2023 13:45)  HR: 88 (10 Nov 2023 17:55) (68 - 104)  BP: 122/68 (10 Nov 2023 17:55) (113/80 - 167/71)  BP(mean): 94 (10 Nov 2023 09:13) (94 - 94)  RR: 18 (10 Nov 2023 17:55) (18 - 19)  SpO2: 96% (10 Nov 2023 17:55) (96% - 98%)    Parameters below as of 10 Nov 2023 17:55  Patient On (Oxygen Delivery Method): room air                              12.6   9.64  )-----------( 250      ( 10 Nov 2023 09:58 )             38.6    11-10    141  |  106  |  18  ----------------------------<  91  4.4   |  29  |  1.28    Ca    9.2      10 Nov 2023 09:58    TPro  6.5  /  Alb  3.1<L>  /  TBili  0.8  /  DBili  x   /  AST  17  /  ALT  15  /  AlkPhos  52  11-10       PHYSICAL EXAM:  Gen: NAD  Skin: No rashes, bruises, or lesions  Head: Normocephalic, Atraumatic  Face: no edema, erythema, or fluctuance.    Eyes: no scleral injectio  Nose: Nares bilaterally patent, no discharge  Mouth: No stridor, no drooling, no trismus.  Mucosa moist, tongue/uvula midline, oropharynx clear  Neck: Flat, supple   Lymphatic: No lymphadenopathy  Resp: breathing easily                  Fiberoptic Indirect laryngoscopy:   Flexible laryngoscopy was performed and revealed the following:    -- Nasopharynx had no mass or exudate.    -- Posterior pharyngeal wall clear     -- Base of tongue was + mild edema     -- Vallecula unable to be visualized     -- Epiglottis without abnormalities     -- Arytenoids both with + mild edema     -- True vocal fold - LEFT: + white nodular lesions- paralyzed  - RIGHT: minimal adduction - b/l paramedian position     -- Airway patent - 4mm     IMAGING/ADDITIONAL STUDIES:     < from: CT Neck Soft Tissue w/ IV Cont (11.10.23 @ 10:32) >  CC: [id number] EXAM:  CT NECK SOFT TISSUE IC   ORDERED BY: LILY JESSICA     PROCEDURE DATE:  11/10/2023          INTERPRETATION:  CT EXAMINATION OF THE NECK    CLINICAL INDICATION: Difficulty swallowing. Bottom right tooth infection   currently.    TECHNIQUE: Multidetector reformatted CT images of the neck were obtained   after contrast administration. Omnipaque 350 IV contrast dose: 90 cc   administered.    COMPARISON: CT head 12/1/2020.    FINDINGS:  Aerodigestive structures: Streak artifact from dental hardware limits   evaluation of the oral cavity. Right mandibular third molar tooth   periapical lucency is partially visualized. No adjacent rim-enhancing   collection is identified within limitations of the study.    There is swelling of the aryepiglottic folds bilaterally.    Likely dystrophic calcification involving the epiglottis.    Lymph nodes: No pathologic adenopathy by imaging size criteria.    Parotid and submandibular glands:  Normal.    Thyroid gland: Normal.    Vascular structures: Atherosclerotic changes bilaterally.    Osseous structures: Multilevel degenerative changes of the cervical spine.    Paranasal sinuses: Clear.  Mastoid air cells:  Clear.    Partially visualized orbits: Unremarkable.    Partially visualizedintracranial structures: Normal.    Partially visualized lung apices: Normal.    IMPRESSION:  -Swelling of the aryepiglottic folds bilaterally. Recommend ENT   evaluation for direct visualization.    -No rim-enhancing collection identified to suggestabscess.    --- End of Report ---            VAIBHAV ODELL   This document has been electronically signed. Nov 10 2023 11:32AM    < end of copied text >

## 2023-11-10 NOTE — ED ADULT NURSE NOTE - NSFALLUNIVINTERV_ED_ALL_ED
Bed/Stretcher in lowest position, wheels locked, appropriate side rails in place/Call bell, personal items and telephone in reach/Instruct patient to call for assistance before getting out of bed/chair/stretcher/Non-slip footwear applied when patient is off stretcher/[place] to call system/Physically safe environment - no spills, clutter or unnecessary equipment/Purposeful proactive rounding/Room/bathroom lighting operational, light cord in reach

## 2023-11-10 NOTE — H&P ADULT - PROBLEM SELECTOR PLAN 4
Continue with ASHKAN Van: 4 On dulera at home     Dulera has ICS activity which may promote thrush formation  c/w dulera (no pure LABA only options available on formulary)

## 2023-11-10 NOTE — ED ADULT NURSE NOTE - OBJECTIVE STATEMENT
Patient came in transferred from [facility] for ENT evaluation. No other complaints. ENT saw the patient. Specimens collected and sent. Medications given as ordered. Patient tolerated well. Nursing care continues

## 2023-11-10 NOTE — ED ADULT NURSE NOTE - OBJECTIVE STATEMENT
Pt presents to ER c/o throat pain and difficulty swallowing. Pt reports last week he had a tooth infection and was recently diagnosed with a gum infection. Denies fever/chills. Airway patent. AO x 3

## 2023-11-11 DIAGNOSIS — Z71.89 OTHER SPECIFIED COUNSELING: ICD-10-CM

## 2023-11-11 LAB
A1C WITH ESTIMATED AVERAGE GLUCOSE RESULT: 5.6 % — SIGNIFICANT CHANGE UP (ref 4–5.6)
A1C WITH ESTIMATED AVERAGE GLUCOSE RESULT: 5.6 % — SIGNIFICANT CHANGE UP (ref 4–5.6)
ALBUMIN SERPL ELPH-MCNC: 3.7 G/DL — SIGNIFICANT CHANGE UP (ref 3.3–5)
ALBUMIN SERPL ELPH-MCNC: 3.7 G/DL — SIGNIFICANT CHANGE UP (ref 3.3–5)
ALP SERPL-CCNC: 57 U/L — SIGNIFICANT CHANGE UP (ref 40–120)
ALP SERPL-CCNC: 57 U/L — SIGNIFICANT CHANGE UP (ref 40–120)
ALT FLD-CCNC: 13 U/L — SIGNIFICANT CHANGE UP (ref 4–41)
ALT FLD-CCNC: 13 U/L — SIGNIFICANT CHANGE UP (ref 4–41)
ANION GAP SERPL CALC-SCNC: 15 MMOL/L — HIGH (ref 7–14)
ANION GAP SERPL CALC-SCNC: 15 MMOL/L — HIGH (ref 7–14)
APTT BLD: 184.2 SEC — CRITICAL HIGH (ref 24.5–35.6)
APTT BLD: 184.2 SEC — CRITICAL HIGH (ref 24.5–35.6)
APTT BLD: 36.8 SEC — HIGH (ref 24.5–35.6)
APTT BLD: 36.8 SEC — HIGH (ref 24.5–35.6)
AST SERPL-CCNC: 19 U/L — SIGNIFICANT CHANGE UP (ref 4–40)
AST SERPL-CCNC: 19 U/L — SIGNIFICANT CHANGE UP (ref 4–40)
BASOPHILS # BLD AUTO: 0.01 K/UL — SIGNIFICANT CHANGE UP (ref 0–0.2)
BASOPHILS # BLD AUTO: 0.01 K/UL — SIGNIFICANT CHANGE UP (ref 0–0.2)
BASOPHILS NFR BLD AUTO: 0.1 % — SIGNIFICANT CHANGE UP (ref 0–2)
BASOPHILS NFR BLD AUTO: 0.1 % — SIGNIFICANT CHANGE UP (ref 0–2)
BILIRUB SERPL-MCNC: 0.7 MG/DL — SIGNIFICANT CHANGE UP (ref 0.2–1.2)
BILIRUB SERPL-MCNC: 0.7 MG/DL — SIGNIFICANT CHANGE UP (ref 0.2–1.2)
BUN SERPL-MCNC: 24 MG/DL — HIGH (ref 7–23)
BUN SERPL-MCNC: 24 MG/DL — HIGH (ref 7–23)
CALCIUM SERPL-MCNC: 9.7 MG/DL — SIGNIFICANT CHANGE UP (ref 8.4–10.5)
CALCIUM SERPL-MCNC: 9.7 MG/DL — SIGNIFICANT CHANGE UP (ref 8.4–10.5)
CHLORIDE SERPL-SCNC: 99 MMOL/L — SIGNIFICANT CHANGE UP (ref 98–107)
CHLORIDE SERPL-SCNC: 99 MMOL/L — SIGNIFICANT CHANGE UP (ref 98–107)
CHOLEST SERPL-MCNC: 166 MG/DL — SIGNIFICANT CHANGE UP
CHOLEST SERPL-MCNC: 166 MG/DL — SIGNIFICANT CHANGE UP
CO2 SERPL-SCNC: 22 MMOL/L — SIGNIFICANT CHANGE UP (ref 22–31)
CO2 SERPL-SCNC: 22 MMOL/L — SIGNIFICANT CHANGE UP (ref 22–31)
CREAT SERPL-MCNC: 1.26 MG/DL — SIGNIFICANT CHANGE UP (ref 0.5–1.3)
CREAT SERPL-MCNC: 1.26 MG/DL — SIGNIFICANT CHANGE UP (ref 0.5–1.3)
EGFR: 58 ML/MIN/1.73M2 — LOW
EGFR: 58 ML/MIN/1.73M2 — LOW
EOSINOPHIL # BLD AUTO: 0 K/UL — SIGNIFICANT CHANGE UP (ref 0–0.5)
EOSINOPHIL # BLD AUTO: 0 K/UL — SIGNIFICANT CHANGE UP (ref 0–0.5)
EOSINOPHIL NFR BLD AUTO: 0 % — SIGNIFICANT CHANGE UP (ref 0–6)
EOSINOPHIL NFR BLD AUTO: 0 % — SIGNIFICANT CHANGE UP (ref 0–6)
ESTIMATED AVERAGE GLUCOSE: 114 — SIGNIFICANT CHANGE UP
ESTIMATED AVERAGE GLUCOSE: 114 — SIGNIFICANT CHANGE UP
GLUCOSE BLDC GLUCOMTR-MCNC: 130 MG/DL — HIGH (ref 70–99)
GLUCOSE BLDC GLUCOMTR-MCNC: 130 MG/DL — HIGH (ref 70–99)
GLUCOSE BLDC GLUCOMTR-MCNC: 131 MG/DL — HIGH (ref 70–99)
GLUCOSE BLDC GLUCOMTR-MCNC: 131 MG/DL — HIGH (ref 70–99)
GLUCOSE BLDC GLUCOMTR-MCNC: 139 MG/DL — HIGH (ref 70–99)
GLUCOSE BLDC GLUCOMTR-MCNC: 139 MG/DL — HIGH (ref 70–99)
GLUCOSE SERPL-MCNC: 133 MG/DL — HIGH (ref 70–99)
GLUCOSE SERPL-MCNC: 133 MG/DL — HIGH (ref 70–99)
HCT VFR BLD CALC: 39.9 % — SIGNIFICANT CHANGE UP (ref 39–50)
HCT VFR BLD CALC: 39.9 % — SIGNIFICANT CHANGE UP (ref 39–50)
HCT VFR BLD CALC: 40.7 % — SIGNIFICANT CHANGE UP (ref 39–50)
HCT VFR BLD CALC: 40.7 % — SIGNIFICANT CHANGE UP (ref 39–50)
HDLC SERPL-MCNC: 55 MG/DL — SIGNIFICANT CHANGE UP
HDLC SERPL-MCNC: 55 MG/DL — SIGNIFICANT CHANGE UP
HGB BLD-MCNC: 13 G/DL — SIGNIFICANT CHANGE UP (ref 13–17)
HGB BLD-MCNC: 13 G/DL — SIGNIFICANT CHANGE UP (ref 13–17)
HGB BLD-MCNC: 13.1 G/DL — SIGNIFICANT CHANGE UP (ref 13–17)
HGB BLD-MCNC: 13.1 G/DL — SIGNIFICANT CHANGE UP (ref 13–17)
IANC: 9.03 K/UL — HIGH (ref 1.8–7.4)
IANC: 9.03 K/UL — HIGH (ref 1.8–7.4)
IMM GRANULOCYTES NFR BLD AUTO: 0.5 % — SIGNIFICANT CHANGE UP (ref 0–0.9)
IMM GRANULOCYTES NFR BLD AUTO: 0.5 % — SIGNIFICANT CHANGE UP (ref 0–0.9)
INR BLD: 1.12 RATIO — SIGNIFICANT CHANGE UP (ref 0.85–1.18)
INR BLD: 1.12 RATIO — SIGNIFICANT CHANGE UP (ref 0.85–1.18)
LIPID PNL WITH DIRECT LDL SERPL: 97 MG/DL — SIGNIFICANT CHANGE UP
LIPID PNL WITH DIRECT LDL SERPL: 97 MG/DL — SIGNIFICANT CHANGE UP
LYMPHOCYTES # BLD AUTO: 0.67 K/UL — LOW (ref 1–3.3)
LYMPHOCYTES # BLD AUTO: 0.67 K/UL — LOW (ref 1–3.3)
LYMPHOCYTES # BLD AUTO: 6.8 % — LOW (ref 13–44)
LYMPHOCYTES # BLD AUTO: 6.8 % — LOW (ref 13–44)
MAGNESIUM SERPL-MCNC: 2.1 MG/DL — SIGNIFICANT CHANGE UP (ref 1.6–2.6)
MAGNESIUM SERPL-MCNC: 2.1 MG/DL — SIGNIFICANT CHANGE UP (ref 1.6–2.6)
MCHC RBC-ENTMCNC: 29.5 PG — SIGNIFICANT CHANGE UP (ref 27–34)
MCHC RBC-ENTMCNC: 29.5 PG — SIGNIFICANT CHANGE UP (ref 27–34)
MCHC RBC-ENTMCNC: 29.6 PG — SIGNIFICANT CHANGE UP (ref 27–34)
MCHC RBC-ENTMCNC: 29.6 PG — SIGNIFICANT CHANGE UP (ref 27–34)
MCHC RBC-ENTMCNC: 32.2 GM/DL — SIGNIFICANT CHANGE UP (ref 32–36)
MCHC RBC-ENTMCNC: 32.2 GM/DL — SIGNIFICANT CHANGE UP (ref 32–36)
MCHC RBC-ENTMCNC: 32.6 GM/DL — SIGNIFICANT CHANGE UP (ref 32–36)
MCHC RBC-ENTMCNC: 32.6 GM/DL — SIGNIFICANT CHANGE UP (ref 32–36)
MCV RBC AUTO: 90.7 FL — SIGNIFICANT CHANGE UP (ref 80–100)
MCV RBC AUTO: 90.7 FL — SIGNIFICANT CHANGE UP (ref 80–100)
MCV RBC AUTO: 92.1 FL — SIGNIFICANT CHANGE UP (ref 80–100)
MCV RBC AUTO: 92.1 FL — SIGNIFICANT CHANGE UP (ref 80–100)
MONOCYTES # BLD AUTO: 0.1 K/UL — SIGNIFICANT CHANGE UP (ref 0–0.9)
MONOCYTES # BLD AUTO: 0.1 K/UL — SIGNIFICANT CHANGE UP (ref 0–0.9)
MONOCYTES NFR BLD AUTO: 1 % — LOW (ref 2–14)
MONOCYTES NFR BLD AUTO: 1 % — LOW (ref 2–14)
NEUTROPHILS # BLD AUTO: 9.03 K/UL — HIGH (ref 1.8–7.4)
NEUTROPHILS # BLD AUTO: 9.03 K/UL — HIGH (ref 1.8–7.4)
NEUTROPHILS NFR BLD AUTO: 91.6 % — HIGH (ref 43–77)
NEUTROPHILS NFR BLD AUTO: 91.6 % — HIGH (ref 43–77)
NON HDL CHOLESTEROL: 111 MG/DL — SIGNIFICANT CHANGE UP
NON HDL CHOLESTEROL: 111 MG/DL — SIGNIFICANT CHANGE UP
NRBC # BLD: 0 /100 WBCS — SIGNIFICANT CHANGE UP (ref 0–0)
NRBC # FLD: 0 K/UL — SIGNIFICANT CHANGE UP (ref 0–0)
PHOSPHATE SERPL-MCNC: 3.4 MG/DL — SIGNIFICANT CHANGE UP (ref 2.5–4.5)
PHOSPHATE SERPL-MCNC: 3.4 MG/DL — SIGNIFICANT CHANGE UP (ref 2.5–4.5)
PLATELET # BLD AUTO: 287 K/UL — SIGNIFICANT CHANGE UP (ref 150–400)
PLATELET # BLD AUTO: 287 K/UL — SIGNIFICANT CHANGE UP (ref 150–400)
PLATELET # BLD AUTO: 291 K/UL — SIGNIFICANT CHANGE UP (ref 150–400)
PLATELET # BLD AUTO: 291 K/UL — SIGNIFICANT CHANGE UP (ref 150–400)
POTASSIUM SERPL-MCNC: 4.3 MMOL/L — SIGNIFICANT CHANGE UP (ref 3.5–5.3)
POTASSIUM SERPL-MCNC: 4.3 MMOL/L — SIGNIFICANT CHANGE UP (ref 3.5–5.3)
POTASSIUM SERPL-SCNC: 4.3 MMOL/L — SIGNIFICANT CHANGE UP (ref 3.5–5.3)
POTASSIUM SERPL-SCNC: 4.3 MMOL/L — SIGNIFICANT CHANGE UP (ref 3.5–5.3)
PROT SERPL-MCNC: 6.8 G/DL — SIGNIFICANT CHANGE UP (ref 6–8.3)
PROT SERPL-MCNC: 6.8 G/DL — SIGNIFICANT CHANGE UP (ref 6–8.3)
PROTHROM AB SERPL-ACNC: 12.6 SEC — SIGNIFICANT CHANGE UP (ref 9.5–13)
PROTHROM AB SERPL-ACNC: 12.6 SEC — SIGNIFICANT CHANGE UP (ref 9.5–13)
RBC # BLD: 4.4 M/UL — SIGNIFICANT CHANGE UP (ref 4.2–5.8)
RBC # BLD: 4.4 M/UL — SIGNIFICANT CHANGE UP (ref 4.2–5.8)
RBC # BLD: 4.42 M/UL — SIGNIFICANT CHANGE UP (ref 4.2–5.8)
RBC # BLD: 4.42 M/UL — SIGNIFICANT CHANGE UP (ref 4.2–5.8)
RBC # FLD: 15.2 % — HIGH (ref 10.3–14.5)
SODIUM SERPL-SCNC: 136 MMOL/L — SIGNIFICANT CHANGE UP (ref 135–145)
SODIUM SERPL-SCNC: 136 MMOL/L — SIGNIFICANT CHANGE UP (ref 135–145)
TRIGL SERPL-MCNC: 69 MG/DL — SIGNIFICANT CHANGE UP
TRIGL SERPL-MCNC: 69 MG/DL — SIGNIFICANT CHANGE UP
WBC # BLD: 14.06 K/UL — HIGH (ref 3.8–10.5)
WBC # BLD: 14.06 K/UL — HIGH (ref 3.8–10.5)
WBC # BLD: 9.86 K/UL — SIGNIFICANT CHANGE UP (ref 3.8–10.5)
WBC # BLD: 9.86 K/UL — SIGNIFICANT CHANGE UP (ref 3.8–10.5)
WBC # FLD AUTO: 14.06 K/UL — HIGH (ref 3.8–10.5)
WBC # FLD AUTO: 14.06 K/UL — HIGH (ref 3.8–10.5)
WBC # FLD AUTO: 9.86 K/UL — SIGNIFICANT CHANGE UP (ref 3.8–10.5)
WBC # FLD AUTO: 9.86 K/UL — SIGNIFICANT CHANGE UP (ref 3.8–10.5)

## 2023-11-11 PROCEDURE — 99233 SBSQ HOSP IP/OBS HIGH 50: CPT

## 2023-11-11 RX ORDER — AMPICILLIN SODIUM AND SULBACTAM SODIUM 250; 125 MG/ML; MG/ML
INJECTION, POWDER, FOR SUSPENSION INTRAMUSCULAR; INTRAVENOUS
Refills: 0 | Status: DISCONTINUED | OUTPATIENT
Start: 2023-11-11 | End: 2023-11-13

## 2023-11-11 RX ORDER — HEPARIN SODIUM 5000 [USP'U]/ML
INJECTION INTRAVENOUS; SUBCUTANEOUS
Qty: 25000 | Refills: 0 | Status: DISCONTINUED | OUTPATIENT
Start: 2023-11-11 | End: 2023-11-11

## 2023-11-11 RX ORDER — METOPROLOL TARTRATE 50 MG
2.5 TABLET ORAL ONCE
Refills: 0 | Status: COMPLETED | OUTPATIENT
Start: 2023-11-11 | End: 2023-11-12

## 2023-11-11 RX ORDER — HEPARIN SODIUM 5000 [USP'U]/ML
1600 INJECTION INTRAVENOUS; SUBCUTANEOUS
Qty: 25000 | Refills: 0 | Status: DISCONTINUED | OUTPATIENT
Start: 2023-11-11 | End: 2023-11-13

## 2023-11-11 RX ORDER — FLUCONAZOLE 150 MG/1
400 TABLET ORAL EVERY 24 HOURS
Refills: 0 | Status: DISCONTINUED | OUTPATIENT
Start: 2023-11-12 | End: 2023-11-13

## 2023-11-11 RX ORDER — AMPICILLIN SODIUM AND SULBACTAM SODIUM 250; 125 MG/ML; MG/ML
1.5 INJECTION, POWDER, FOR SUSPENSION INTRAMUSCULAR; INTRAVENOUS ONCE
Refills: 0 | Status: COMPLETED | OUTPATIENT
Start: 2023-11-11 | End: 2023-11-11

## 2023-11-11 RX ORDER — HEPARIN SODIUM 5000 [USP'U]/ML
4500 INJECTION INTRAVENOUS; SUBCUTANEOUS EVERY 6 HOURS
Refills: 0 | Status: DISCONTINUED | OUTPATIENT
Start: 2023-11-11 | End: 2023-11-13

## 2023-11-11 RX ORDER — FLUCONAZOLE 150 MG/1
400 TABLET ORAL ONCE
Refills: 0 | Status: COMPLETED | OUTPATIENT
Start: 2023-11-11 | End: 2023-11-11

## 2023-11-11 RX ORDER — HEPARIN SODIUM 5000 [USP'U]/ML
9000 INJECTION INTRAVENOUS; SUBCUTANEOUS EVERY 6 HOURS
Refills: 0 | Status: DISCONTINUED | OUTPATIENT
Start: 2023-11-11 | End: 2023-11-13

## 2023-11-11 RX ORDER — AMPICILLIN SODIUM AND SULBACTAM SODIUM 250; 125 MG/ML; MG/ML
1.5 INJECTION, POWDER, FOR SUSPENSION INTRAMUSCULAR; INTRAVENOUS EVERY 6 HOURS
Refills: 0 | Status: DISCONTINUED | OUTPATIENT
Start: 2023-11-11 | End: 2023-11-13

## 2023-11-11 RX ADMIN — HEPARIN SODIUM 9000 UNIT(S): 5000 INJECTION INTRAVENOUS; SUBCUTANEOUS at 14:24

## 2023-11-11 RX ADMIN — BUDESONIDE AND FORMOTEROL FUMARATE DIHYDRATE 2 PUFF(S): 160; 4.5 AEROSOL RESPIRATORY (INHALATION) at 23:35

## 2023-11-11 RX ADMIN — HEPARIN SODIUM 2000 UNIT(S)/HR: 5000 INJECTION INTRAVENOUS; SUBCUTANEOUS at 20:31

## 2023-11-11 RX ADMIN — AMPICILLIN SODIUM AND SULBACTAM SODIUM 100 GRAM(S): 250; 125 INJECTION, POWDER, FOR SUSPENSION INTRAMUSCULAR; INTRAVENOUS at 14:23

## 2023-11-11 RX ADMIN — HEPARIN SODIUM 1600 UNIT(S)/HR: 5000 INJECTION INTRAVENOUS; SUBCUTANEOUS at 22:39

## 2023-11-11 RX ADMIN — BUDESONIDE AND FORMOTEROL FUMARATE DIHYDRATE 2 PUFF(S): 160; 4.5 AEROSOL RESPIRATORY (INHALATION) at 12:56

## 2023-11-11 RX ADMIN — Medication 102 MILLIGRAM(S): at 11:55

## 2023-11-11 RX ADMIN — Medication 102 MILLIGRAM(S): at 19:32

## 2023-11-11 RX ADMIN — AMPICILLIN SODIUM AND SULBACTAM SODIUM 100 GRAM(S): 250; 125 INJECTION, POWDER, FOR SUSPENSION INTRAMUSCULAR; INTRAVENOUS at 17:38

## 2023-11-11 RX ADMIN — HEPARIN SODIUM 2000 UNIT(S)/HR: 5000 INJECTION INTRAVENOUS; SUBCUTANEOUS at 14:00

## 2023-11-11 RX ADMIN — FLUCONAZOLE 100 MILLIGRAM(S): 150 TABLET ORAL at 00:57

## 2023-11-11 RX ADMIN — Medication 102 MILLIGRAM(S): at 04:38

## 2023-11-11 NOTE — CHART NOTE - NSCHARTNOTEFT_GEN_A_CORE
Patient noted with frequent PVCs on tele. Patient evaluated at bedside. Patient has no complaints. Denies chest pain, palpitations or dizziness.   EKG: afib with PVC.   tele: afib with frequent PVCs  T(C): 36.6 (11-11-23 @ 21:57), Max: 36.7 (11-11-23 @ 04:40)  HR: 65 (11-11-23 @ 21:57) (60 - 77)  BP: 174/79 (11-11-23 @ 21:57) (133/69 - 174/79)  RR: 18 (11-11-23 @ 21:57) (15 - 18)  SpO2: 100% (11-11-23 @ 21:57) (97% - 100%)  Wt(kg): --    patient currently NPO and missed his dose of PO metoprolol. Will give 2.5mg of IVP of metoprolol.

## 2023-11-11 NOTE — SWALLOW BEDSIDE ASSESSMENT ADULT - ADDITIONAL RECOMMENDATIONS
1). Continue NPO until FEES can be completed. Given LVC paralyzed and RVC with minimal adduction - b/l paramedian position, FEES to be completed to objectively assess swallow function.   2). This service to f/u as scheduling permits'  3). Pt. and family already educated by MD regarding FEES procedure.   4). MD was notified that this service will f/u Monday to confirm FEES and coordinate with Pulmonary team.

## 2023-11-11 NOTE — SWALLOW BEDSIDE ASSESSMENT ADULT - NS ASR SWALLOW FINDINGS DISCUS
Patient is a 15-
Physician/Nursing/Patient/Family

## 2023-11-11 NOTE — SWALLOW BEDSIDE ASSESSMENT ADULT - COMMENTS
Pt. seated upright in bed in NAD. Family member at bedside. Pt. AAOx3 with hoarse vocal quality.     Per chart:   "HPI: 78M pmhx asthma afib CAD CABGx3 Colon cancer and history of left vocal cord cancer 2015? (per pt and wife)  treated Dr Collazo (in [place]) with 28 days of RT presented to [place] progressing hoarseness and  with  1 week of difficulty swallowing now with inability to swallow liquids. Pt transferred to [facility] for ENT eval."    ENT:   "Flexible laryngoscopy was performed and revealed the following:    -- Nasopharynx had no mass or exudate.    -- Posterior pharyngeal wall clear     -- Base of tongue was + mild edema     -- Vallecula unable to be visualized     -- Epiglottis without abnormalities     -- Arytenoids both with + mild edema     -- True vocal fold - LEFT: + white nodular lesions- paralyzed  - RIGHT: minimal adduction - b/l paramedian position"    -WBC WNL per lab review  -CT CHEST: "Scattered calcified pleural plaques, suggestive of asbestos-related pleural disease. Expected postsurgical changes related to median sternotomy, CABG, and mitral valve replacement. Unremarkable esophagus. If clinically indicated, a dedicated esophagram is recommended for further evaluation."  -CT NECK: "Swelling of the aryepiglottic folds bilaterally. Recommend ENT evaluation for direct visualization. No rim-enhancing collection identified to suggest abscess."    Pt. reported change in vocal quality in August s/p OSCAR ablation. Pt. endorsed worsening vocal quality since with dysphagia the past ~2wks. Pt. denied coughing during meals, rather endorsed solids/liquids not going down/getting stuck. Pt. with minimal PO intake for about 2wks. PTA. Pt. was mainly eating Pureed solids and thin liquids when able to tolerate.

## 2023-11-11 NOTE — PATIENT PROFILE ADULT - HAVE YOU RECENTLY LOST WEIGHT WITHOUT TRYING?
It was good to see you today, Stephanie.    Here are the things we talked about:  Let's increase the gabapentin to 300 mg by mouth three times a day.  I sent in a new prescription to [facility] this morning.  Let me know if this  makes you excessively tired or if you start feeling dizzy or experience falls.    Please re-connect with your primary care provider about next steps for rehabbing your left pyriformis injury.    Someone from the team will reach out to schedule a follow up appointment in 2 months.     How to get a hold of us:  For non-urgent matters, MyChart works best.    For more urgent matters, or if you prefer not to use MyChart, call our clinic nurse coordinator Anabela Hillman RN at [phone]    We have an on-call number for evenings and weekends. Please call this only if you are having uncontrolled symptoms or serious side effects from your medicines: [phone].     For refills, please give us a week (5 working days) notice. We don't always have providers available everyday to do refills. If you call the day you run out of your medicine, we may not be able to refill it in time, so call 5 days in advance!    Tobi Miguel MD MS FAAFP  [facility]  Office [phone]  Fax [phone]   
No (0)

## 2023-11-11 NOTE — SWALLOW BEDSIDE ASSESSMENT ADULT - SWALLOW EVAL: DIAGNOSIS
Pt. was given trials of Thin liquids and Puree. 1. Functional oral stage across trials marked by adequate bolus retrieval with complete oral containment. Prompt swallow initiation with efficient bolus manipulation/formation. Timely posterior bolus transfer with complete oral clearance. 2. Suspected pharyngeal dysphagia marked by delayed swallow trigger judged via hyolaryngeal digital palpation. Complaints of pharyngeal stasis and multiple swallows palpated per bolus (3+) suggestive of penetration/aspiration or pharyngeal residue. No overt coughing/throat clearing noted.

## 2023-11-12 LAB
ANION GAP SERPL CALC-SCNC: 11 MMOL/L — SIGNIFICANT CHANGE UP (ref 7–14)
ANION GAP SERPL CALC-SCNC: 11 MMOL/L — SIGNIFICANT CHANGE UP (ref 7–14)
APTT BLD: 118.6 SEC — HIGH (ref 24.5–35.6)
APTT BLD: 118.6 SEC — HIGH (ref 24.5–35.6)
APTT BLD: 168 SEC — CRITICAL HIGH (ref 24.5–35.6)
APTT BLD: 168 SEC — CRITICAL HIGH (ref 24.5–35.6)
APTT BLD: 86.9 SEC — HIGH (ref 24.5–35.6)
APTT BLD: 86.9 SEC — HIGH (ref 24.5–35.6)
BUN SERPL-MCNC: 26 MG/DL — HIGH (ref 7–23)
BUN SERPL-MCNC: 26 MG/DL — HIGH (ref 7–23)
CALCIUM SERPL-MCNC: 9.8 MG/DL — SIGNIFICANT CHANGE UP (ref 8.4–10.5)
CALCIUM SERPL-MCNC: 9.8 MG/DL — SIGNIFICANT CHANGE UP (ref 8.4–10.5)
CHLORIDE SERPL-SCNC: 98 MMOL/L — SIGNIFICANT CHANGE UP (ref 98–107)
CHLORIDE SERPL-SCNC: 98 MMOL/L — SIGNIFICANT CHANGE UP (ref 98–107)
CO2 SERPL-SCNC: 27 MMOL/L — SIGNIFICANT CHANGE UP (ref 22–31)
CO2 SERPL-SCNC: 27 MMOL/L — SIGNIFICANT CHANGE UP (ref 22–31)
CREAT SERPL-MCNC: 1.18 MG/DL — SIGNIFICANT CHANGE UP (ref 0.5–1.3)
CREAT SERPL-MCNC: 1.18 MG/DL — SIGNIFICANT CHANGE UP (ref 0.5–1.3)
EGFR: 62 ML/MIN/1.73M2 — SIGNIFICANT CHANGE UP
EGFR: 62 ML/MIN/1.73M2 — SIGNIFICANT CHANGE UP
GLUCOSE BLDC GLUCOMTR-MCNC: 127 MG/DL — HIGH (ref 70–99)
GLUCOSE BLDC GLUCOMTR-MCNC: 127 MG/DL — HIGH (ref 70–99)
GLUCOSE BLDC GLUCOMTR-MCNC: 131 MG/DL — HIGH (ref 70–99)
GLUCOSE BLDC GLUCOMTR-MCNC: 131 MG/DL — HIGH (ref 70–99)
GLUCOSE BLDC GLUCOMTR-MCNC: 152 MG/DL — HIGH (ref 70–99)
GLUCOSE BLDC GLUCOMTR-MCNC: 152 MG/DL — HIGH (ref 70–99)
GLUCOSE SERPL-MCNC: 135 MG/DL — HIGH (ref 70–99)
GLUCOSE SERPL-MCNC: 135 MG/DL — HIGH (ref 70–99)
HCT VFR BLD CALC: 40.6 % — SIGNIFICANT CHANGE UP (ref 39–50)
HCT VFR BLD CALC: 40.6 % — SIGNIFICANT CHANGE UP (ref 39–50)
HGB BLD-MCNC: 13.3 G/DL — SIGNIFICANT CHANGE UP (ref 13–17)
HGB BLD-MCNC: 13.3 G/DL — SIGNIFICANT CHANGE UP (ref 13–17)
MAGNESIUM SERPL-MCNC: 2.3 MG/DL — SIGNIFICANT CHANGE UP (ref 1.6–2.6)
MAGNESIUM SERPL-MCNC: 2.3 MG/DL — SIGNIFICANT CHANGE UP (ref 1.6–2.6)
MCHC RBC-ENTMCNC: 30 PG — SIGNIFICANT CHANGE UP (ref 27–34)
MCHC RBC-ENTMCNC: 30 PG — SIGNIFICANT CHANGE UP (ref 27–34)
MCHC RBC-ENTMCNC: 32.8 GM/DL — SIGNIFICANT CHANGE UP (ref 32–36)
MCHC RBC-ENTMCNC: 32.8 GM/DL — SIGNIFICANT CHANGE UP (ref 32–36)
MCV RBC AUTO: 91.4 FL — SIGNIFICANT CHANGE UP (ref 80–100)
MCV RBC AUTO: 91.4 FL — SIGNIFICANT CHANGE UP (ref 80–100)
NRBC # BLD: 0 /100 WBCS — SIGNIFICANT CHANGE UP (ref 0–0)
NRBC # BLD: 0 /100 WBCS — SIGNIFICANT CHANGE UP (ref 0–0)
NRBC # FLD: 0 K/UL — SIGNIFICANT CHANGE UP (ref 0–0)
NRBC # FLD: 0 K/UL — SIGNIFICANT CHANGE UP (ref 0–0)
PHOSPHATE SERPL-MCNC: 2.8 MG/DL — SIGNIFICANT CHANGE UP (ref 2.5–4.5)
PHOSPHATE SERPL-MCNC: 2.8 MG/DL — SIGNIFICANT CHANGE UP (ref 2.5–4.5)
PLATELET # BLD AUTO: 304 K/UL — SIGNIFICANT CHANGE UP (ref 150–400)
PLATELET # BLD AUTO: 304 K/UL — SIGNIFICANT CHANGE UP (ref 150–400)
POTASSIUM SERPL-MCNC: 4.6 MMOL/L — SIGNIFICANT CHANGE UP (ref 3.5–5.3)
POTASSIUM SERPL-MCNC: 4.6 MMOL/L — SIGNIFICANT CHANGE UP (ref 3.5–5.3)
POTASSIUM SERPL-SCNC: 4.6 MMOL/L — SIGNIFICANT CHANGE UP (ref 3.5–5.3)
POTASSIUM SERPL-SCNC: 4.6 MMOL/L — SIGNIFICANT CHANGE UP (ref 3.5–5.3)
RBC # BLD: 4.44 M/UL — SIGNIFICANT CHANGE UP (ref 4.2–5.8)
RBC # BLD: 4.44 M/UL — SIGNIFICANT CHANGE UP (ref 4.2–5.8)
RBC # FLD: 15.3 % — HIGH (ref 10.3–14.5)
RBC # FLD: 15.3 % — HIGH (ref 10.3–14.5)
SODIUM SERPL-SCNC: 136 MMOL/L — SIGNIFICANT CHANGE UP (ref 135–145)
SODIUM SERPL-SCNC: 136 MMOL/L — SIGNIFICANT CHANGE UP (ref 135–145)
WBC # BLD: 15.48 K/UL — HIGH (ref 3.8–10.5)
WBC # BLD: 15.48 K/UL — HIGH (ref 3.8–10.5)
WBC # FLD AUTO: 15.48 K/UL — HIGH (ref 3.8–10.5)
WBC # FLD AUTO: 15.48 K/UL — HIGH (ref 3.8–10.5)

## 2023-11-12 PROCEDURE — 99232 SBSQ HOSP IP/OBS MODERATE 35: CPT

## 2023-11-12 RX ORDER — METOPROLOL TARTRATE 50 MG
5 TABLET ORAL EVERY 6 HOURS
Refills: 0 | Status: DISCONTINUED | OUTPATIENT
Start: 2023-11-12 | End: 2023-11-13

## 2023-11-12 RX ORDER — METOPROLOL TARTRATE 50 MG
5 TABLET ORAL ONCE
Refills: 0 | Status: COMPLETED | OUTPATIENT
Start: 2023-11-12 | End: 2023-11-12

## 2023-11-12 RX ADMIN — BUDESONIDE AND FORMOTEROL FUMARATE DIHYDRATE 2 PUFF(S): 160; 4.5 AEROSOL RESPIRATORY (INHALATION) at 08:12

## 2023-11-12 RX ADMIN — FLUCONAZOLE 100 MILLIGRAM(S): 150 TABLET ORAL at 01:36

## 2023-11-12 RX ADMIN — AMPICILLIN SODIUM AND SULBACTAM SODIUM 100 GRAM(S): 250; 125 INJECTION, POWDER, FOR SUSPENSION INTRAMUSCULAR; INTRAVENOUS at 23:15

## 2023-11-12 RX ADMIN — Medication 5 MILLIGRAM(S): at 23:15

## 2023-11-12 RX ADMIN — Medication 5 MILLIGRAM(S): at 17:41

## 2023-11-12 RX ADMIN — Medication 5 MILLIGRAM(S): at 06:58

## 2023-11-12 RX ADMIN — HEPARIN SODIUM 1000 UNIT(S)/HR: 5000 INJECTION INTRAVENOUS; SUBCUTANEOUS at 21:50

## 2023-11-12 RX ADMIN — Medication 102 MILLIGRAM(S): at 18:25

## 2023-11-12 RX ADMIN — AMPICILLIN SODIUM AND SULBACTAM SODIUM 100 GRAM(S): 250; 125 INJECTION, POWDER, FOR SUSPENSION INTRAMUSCULAR; INTRAVENOUS at 17:41

## 2023-11-12 RX ADMIN — Medication 2.5 MILLIGRAM(S): at 00:57

## 2023-11-12 RX ADMIN — AMPICILLIN SODIUM AND SULBACTAM SODIUM 100 GRAM(S): 250; 125 INJECTION, POWDER, FOR SUSPENSION INTRAMUSCULAR; INTRAVENOUS at 06:43

## 2023-11-12 RX ADMIN — AMPICILLIN SODIUM AND SULBACTAM SODIUM 100 GRAM(S): 250; 125 INJECTION, POWDER, FOR SUSPENSION INTRAMUSCULAR; INTRAVENOUS at 01:00

## 2023-11-12 RX ADMIN — BUDESONIDE AND FORMOTEROL FUMARATE DIHYDRATE 2 PUFF(S): 160; 4.5 AEROSOL RESPIRATORY (INHALATION) at 22:33

## 2023-11-12 RX ADMIN — Medication 102 MILLIGRAM(S): at 13:02

## 2023-11-12 RX ADMIN — HEPARIN SODIUM 0 UNIT(S)/HR: 5000 INJECTION INTRAVENOUS; SUBCUTANEOUS at 07:09

## 2023-11-12 RX ADMIN — HEPARIN SODIUM 1200 UNIT(S)/HR: 5000 INJECTION INTRAVENOUS; SUBCUTANEOUS at 07:37

## 2023-11-12 RX ADMIN — HEPARIN SODIUM 1000 UNIT(S)/HR: 5000 INJECTION INTRAVENOUS; SUBCUTANEOUS at 14:03

## 2023-11-12 RX ADMIN — AMPICILLIN SODIUM AND SULBACTAM SODIUM 100 GRAM(S): 250; 125 INJECTION, POWDER, FOR SUSPENSION INTRAMUSCULAR; INTRAVENOUS at 11:02

## 2023-11-12 RX ADMIN — HEPARIN SODIUM 1600 UNIT(S)/HR: 5000 INJECTION INTRAVENOUS; SUBCUTANEOUS at 07:07

## 2023-11-12 RX ADMIN — HEPARIN SODIUM 1200 UNIT(S)/HR: 5000 INJECTION INTRAVENOUS; SUBCUTANEOUS at 08:05

## 2023-11-12 RX ADMIN — HEPARIN SODIUM 1000 UNIT(S)/HR: 5000 INJECTION INTRAVENOUS; SUBCUTANEOUS at 20:23

## 2023-11-12 RX ADMIN — Medication 102 MILLIGRAM(S): at 06:14

## 2023-11-13 LAB
ANION GAP SERPL CALC-SCNC: 12 MMOL/L — SIGNIFICANT CHANGE UP (ref 7–14)
ANION GAP SERPL CALC-SCNC: 12 MMOL/L — SIGNIFICANT CHANGE UP (ref 7–14)
APTT BLD: 26.2 SEC — SIGNIFICANT CHANGE UP (ref 24.5–35.6)
APTT BLD: 26.2 SEC — SIGNIFICANT CHANGE UP (ref 24.5–35.6)
APTT BLD: >200 SEC — CRITICAL HIGH (ref 24.5–35.6)
APTT BLD: >200 SEC — CRITICAL HIGH (ref 24.5–35.6)
BUN SERPL-MCNC: 29 MG/DL — HIGH (ref 7–23)
BUN SERPL-MCNC: 29 MG/DL — HIGH (ref 7–23)
CALCIUM SERPL-MCNC: 9.2 MG/DL — SIGNIFICANT CHANGE UP (ref 8.4–10.5)
CALCIUM SERPL-MCNC: 9.2 MG/DL — SIGNIFICANT CHANGE UP (ref 8.4–10.5)
CHLORIDE SERPL-SCNC: 101 MMOL/L — SIGNIFICANT CHANGE UP (ref 98–107)
CHLORIDE SERPL-SCNC: 101 MMOL/L — SIGNIFICANT CHANGE UP (ref 98–107)
CO2 SERPL-SCNC: 26 MMOL/L — SIGNIFICANT CHANGE UP (ref 22–31)
CO2 SERPL-SCNC: 26 MMOL/L — SIGNIFICANT CHANGE UP (ref 22–31)
CREAT SERPL-MCNC: 1.2 MG/DL — SIGNIFICANT CHANGE UP (ref 0.5–1.3)
CREAT SERPL-MCNC: 1.2 MG/DL — SIGNIFICANT CHANGE UP (ref 0.5–1.3)
EGFR: 61 ML/MIN/1.73M2 — SIGNIFICANT CHANGE UP
EGFR: 61 ML/MIN/1.73M2 — SIGNIFICANT CHANGE UP
GLUCOSE BLDC GLUCOMTR-MCNC: 121 MG/DL — HIGH (ref 70–99)
GLUCOSE BLDC GLUCOMTR-MCNC: 121 MG/DL — HIGH (ref 70–99)
GLUCOSE BLDC GLUCOMTR-MCNC: 133 MG/DL — HIGH (ref 70–99)
GLUCOSE BLDC GLUCOMTR-MCNC: 133 MG/DL — HIGH (ref 70–99)
GLUCOSE SERPL-MCNC: 128 MG/DL — HIGH (ref 70–99)
GLUCOSE SERPL-MCNC: 128 MG/DL — HIGH (ref 70–99)
HCT VFR BLD CALC: 40.5 % — SIGNIFICANT CHANGE UP (ref 39–50)
HCT VFR BLD CALC: 40.5 % — SIGNIFICANT CHANGE UP (ref 39–50)
HGB BLD-MCNC: 13.1 G/DL — SIGNIFICANT CHANGE UP (ref 13–17)
HGB BLD-MCNC: 13.1 G/DL — SIGNIFICANT CHANGE UP (ref 13–17)
MAGNESIUM SERPL-MCNC: 2.3 MG/DL — SIGNIFICANT CHANGE UP (ref 1.6–2.6)
MAGNESIUM SERPL-MCNC: 2.3 MG/DL — SIGNIFICANT CHANGE UP (ref 1.6–2.6)
MCHC RBC-ENTMCNC: 29.4 PG — SIGNIFICANT CHANGE UP (ref 27–34)
MCHC RBC-ENTMCNC: 29.4 PG — SIGNIFICANT CHANGE UP (ref 27–34)
MCHC RBC-ENTMCNC: 32.3 GM/DL — SIGNIFICANT CHANGE UP (ref 32–36)
MCHC RBC-ENTMCNC: 32.3 GM/DL — SIGNIFICANT CHANGE UP (ref 32–36)
MCV RBC AUTO: 90.8 FL — SIGNIFICANT CHANGE UP (ref 80–100)
MCV RBC AUTO: 90.8 FL — SIGNIFICANT CHANGE UP (ref 80–100)
NRBC # BLD: 0 /100 WBCS — SIGNIFICANT CHANGE UP (ref 0–0)
NRBC # BLD: 0 /100 WBCS — SIGNIFICANT CHANGE UP (ref 0–0)
NRBC # FLD: 0 K/UL — SIGNIFICANT CHANGE UP (ref 0–0)
NRBC # FLD: 0 K/UL — SIGNIFICANT CHANGE UP (ref 0–0)
PHOSPHATE SERPL-MCNC: 3.2 MG/DL — SIGNIFICANT CHANGE UP (ref 2.5–4.5)
PHOSPHATE SERPL-MCNC: 3.2 MG/DL — SIGNIFICANT CHANGE UP (ref 2.5–4.5)
PLATELET # BLD AUTO: 278 K/UL — SIGNIFICANT CHANGE UP (ref 150–400)
PLATELET # BLD AUTO: 278 K/UL — SIGNIFICANT CHANGE UP (ref 150–400)
POTASSIUM SERPL-MCNC: 4.3 MMOL/L — SIGNIFICANT CHANGE UP (ref 3.5–5.3)
POTASSIUM SERPL-MCNC: 4.3 MMOL/L — SIGNIFICANT CHANGE UP (ref 3.5–5.3)
POTASSIUM SERPL-SCNC: 4.3 MMOL/L — SIGNIFICANT CHANGE UP (ref 3.5–5.3)
POTASSIUM SERPL-SCNC: 4.3 MMOL/L — SIGNIFICANT CHANGE UP (ref 3.5–5.3)
RBC # BLD: 4.46 M/UL — SIGNIFICANT CHANGE UP (ref 4.2–5.8)
RBC # BLD: 4.46 M/UL — SIGNIFICANT CHANGE UP (ref 4.2–5.8)
RBC # FLD: 15.6 % — HIGH (ref 10.3–14.5)
RBC # FLD: 15.6 % — HIGH (ref 10.3–14.5)
SODIUM SERPL-SCNC: 139 MMOL/L — SIGNIFICANT CHANGE UP (ref 135–145)
SODIUM SERPL-SCNC: 139 MMOL/L — SIGNIFICANT CHANGE UP (ref 135–145)
WBC # BLD: 13.48 K/UL — HIGH (ref 3.8–10.5)
WBC # BLD: 13.48 K/UL — HIGH (ref 3.8–10.5)
WBC # FLD AUTO: 13.48 K/UL — HIGH (ref 3.8–10.5)
WBC # FLD AUTO: 13.48 K/UL — HIGH (ref 3.8–10.5)

## 2023-11-13 PROCEDURE — 74230 X-RAY XM SWLNG FUNCJ C+: CPT | Mod: 26

## 2023-11-13 PROCEDURE — 99233 SBSQ HOSP IP/OBS HIGH 50: CPT

## 2023-11-13 RX ORDER — SODIUM CHLORIDE 9 MG/ML
1000 INJECTION, SOLUTION INTRAVENOUS
Refills: 0 | Status: DISCONTINUED | OUTPATIENT
Start: 2023-11-13 | End: 2023-11-13

## 2023-11-13 RX ORDER — FENOFIBRATE,MICRONIZED 130 MG
48 CAPSULE ORAL DAILY
Refills: 0 | Status: DISCONTINUED | OUTPATIENT
Start: 2023-11-13 | End: 2023-11-14

## 2023-11-13 RX ORDER — ASPIRIN/CALCIUM CARB/MAGNESIUM 324 MG
81 TABLET ORAL DAILY
Refills: 0 | Status: DISCONTINUED | OUTPATIENT
Start: 2023-11-13 | End: 2023-11-14

## 2023-11-13 RX ORDER — APIXABAN 2.5 MG/1
5 TABLET, FILM COATED ORAL EVERY 12 HOURS
Refills: 0 | Status: DISCONTINUED | OUTPATIENT
Start: 2023-11-13 | End: 2023-11-14

## 2023-11-13 RX ORDER — TAMSULOSIN HYDROCHLORIDE 0.4 MG/1
0.4 CAPSULE ORAL AT BEDTIME
Refills: 0 | Status: DISCONTINUED | OUTPATIENT
Start: 2023-11-13 | End: 2023-11-14

## 2023-11-13 RX ORDER — ATORVASTATIN CALCIUM 80 MG/1
40 TABLET, FILM COATED ORAL AT BEDTIME
Refills: 0 | Status: DISCONTINUED | OUTPATIENT
Start: 2023-11-13 | End: 2023-11-14

## 2023-11-13 RX ORDER — METOPROLOL TARTRATE 50 MG
50 TABLET ORAL
Refills: 0 | Status: DISCONTINUED | OUTPATIENT
Start: 2023-11-13 | End: 2023-11-14

## 2023-11-13 RX ADMIN — SODIUM CHLORIDE 75 MILLILITER(S): 9 INJECTION, SOLUTION INTRAVENOUS at 13:08

## 2023-11-13 RX ADMIN — HEPARIN SODIUM 0 UNIT(S)/HR: 5000 INJECTION INTRAVENOUS; SUBCUTANEOUS at 04:25

## 2023-11-13 RX ADMIN — BUDESONIDE AND FORMOTEROL FUMARATE DIHYDRATE 2 PUFF(S): 160; 4.5 AEROSOL RESPIRATORY (INHALATION) at 21:30

## 2023-11-13 RX ADMIN — Medication 5 MILLIGRAM(S): at 12:09

## 2023-11-13 RX ADMIN — Medication 102 MILLIGRAM(S): at 20:50

## 2023-11-13 RX ADMIN — HEPARIN SODIUM 600 UNIT(S)/HR: 5000 INJECTION INTRAVENOUS; SUBCUTANEOUS at 08:44

## 2023-11-13 RX ADMIN — TAMSULOSIN HYDROCHLORIDE 0.4 MILLIGRAM(S): 0.4 CAPSULE ORAL at 21:30

## 2023-11-13 RX ADMIN — ATORVASTATIN CALCIUM 40 MILLIGRAM(S): 80 TABLET, FILM COATED ORAL at 21:30

## 2023-11-13 RX ADMIN — HEPARIN SODIUM 9000 UNIT(S): 5000 INJECTION INTRAVENOUS; SUBCUTANEOUS at 12:49

## 2023-11-13 RX ADMIN — AMPICILLIN SODIUM AND SULBACTAM SODIUM 100 GRAM(S): 250; 125 INJECTION, POWDER, FOR SUSPENSION INTRAMUSCULAR; INTRAVENOUS at 12:33

## 2023-11-13 RX ADMIN — FLUCONAZOLE 100 MILLIGRAM(S): 150 TABLET ORAL at 05:25

## 2023-11-13 RX ADMIN — HEPARIN SODIUM 1100 UNIT(S)/HR: 5000 INJECTION INTRAVENOUS; SUBCUTANEOUS at 12:48

## 2023-11-13 RX ADMIN — AMPICILLIN SODIUM AND SULBACTAM SODIUM 100 GRAM(S): 250; 125 INJECTION, POWDER, FOR SUSPENSION INTRAMUSCULAR; INTRAVENOUS at 05:40

## 2023-11-13 RX ADMIN — Medication 102 MILLIGRAM(S): at 11:52

## 2023-11-13 RX ADMIN — Medication 50 MILLIGRAM(S): at 17:04

## 2023-11-13 RX ADMIN — HEPARIN SODIUM 600 UNIT(S)/HR: 5000 INJECTION INTRAVENOUS; SUBCUTANEOUS at 05:41

## 2023-11-13 RX ADMIN — Medication 5 MILLIGRAM(S): at 05:38

## 2023-11-13 RX ADMIN — Medication 102 MILLIGRAM(S): at 04:40

## 2023-11-13 RX ADMIN — APIXABAN 5 MILLIGRAM(S): 2.5 TABLET, FILM COATED ORAL at 16:24

## 2023-11-13 NOTE — PROGRESS NOTE ADULT - PROBLEM SELECTOR PLAN 2
Per ENT unlikely thrush; recommending discontinue fluconazole
Has prior thrush, recent biopsy of lesion with evidence of candida    Treat with fluconazole 400 qD
Has prior thrush, recent biopsy of lesion with evidence of candida    Treat with fluconazole IVPB if not able to tolerate po

## 2023-11-13 NOTE — PROVIDER CONTACT NOTE (OTHER) - ASSESSMENT
As per SAVITA Adams to follow normogram  and hold Heparin for a hour, then follow protocols
heparin drip patient off for a hour as per normogram.
Assess for bleeding, Follow Normogram protocol Turn off Heparin drip for a hour. Notify Provider

## 2023-11-13 NOTE — SWALLOW VFSS/MBS ASSESSMENT ADULT - ORAL PHASE
Premature spillage to the Hypopharynx (vallecular) within functional limits Premature Spillage to the Hypopharynx (vallecular and pyriforms)

## 2023-11-13 NOTE — SWALLOW VFSS/MBS ASSESSMENT ADULT - ADDITIONAL RECOMMENDATIONS
This department to follow up as schedule permits to assess for diet tolerance. Medical team further advised to reconsult if there is a change medical status and/or observed change in patients tolerance of recommended PO diet.

## 2023-11-13 NOTE — PROVIDER CONTACT NOTE (OTHER) - BACKGROUND
patient admitted for painful throat unable to swallow. Pt has history of afib was on eliquis patient NPO , patient unable to swallow.  Currently on heparin drip
patient admitted with progressive throat pain with notable vocal edema
Patient admitted for throat pain, unable to swallow

## 2023-11-13 NOTE — PROGRESS NOTE ADULT - SUBJECTIVE AND OBJECTIVE BOX
Patient is stable and passed Cinesophagram, Cleared for a regular diet with thin liquids.As per ENT chief resident ENT is ok with discharge of patient. Patient can follow up out patient with private ENT.  
SUBJECTIVE:  Pt seen & examined at bedside. No acute overnight events. Patient feels somewhat improved as far as throat pain and voice. Still hoarse. SLP planning on FEES. Rescoped today (see below).    Vital Signs Last 24 Hrs  T(C): 36.9 (12 Nov 2023 16:00), Max: 37.1 (12 Nov 2023 11:57)  T(F): 98.4 (12 Nov 2023 16:00), Max: 98.7 (12 Nov 2023 11:57)  HR: 69 (12 Nov 2023 16:00) (69 - 86)  BP: 150/72 (12 Nov 2023 16:00) (118/67 - 168/80)  BP(mean): --  RR: 16 (12 Nov 2023 16:00) (14 - 16)  SpO2: 99% (12 Nov 2023 16:00) (99% - 100%)    Parameters below as of 12 Nov 2023 16:00  Patient On (Oxygen Delivery Method): room air    PE:   NAD, breathing comfortably on room air  Notable hoarseness with abruptions mid sentence, no stridor  Neck flat, supple, no appreciable lymphadenopathy    Fiberoptic Indirect laryngoscopy:   Flexible laryngoscopy was performed and revealed the following:    -- Nasopharynx had no mass or exudate.    -- Posterior pharyngeal wall clear     -- Base of tongue was + mild edema     -- Vallecula unable to be visualized     -- Epiglottis without abnormalities     -- Arytenoids both with + mild edema and thickening    -- True vocal fold - LEFT: + white nodular plaques, immobile in paramedian position                              - RIGHT: paretic with minimal abduction     -- Airway patent - 2-4 mm with posterior glottic gap     A/P:  78M PMH asthma, afib, CAD, CABGx3, colon cancer, and history of left vocal cord cancer 2015? (per pt and wife) s/p resection and RT (~2015, Dr. Collazo at [facility]) p/w 1-2 weeks of acute on chronic vs progressively worsening dysphagia and dysphonia with inability to tolerate PO. CT scan without discrete mass and some likely post radiation changes to glottis. Fiberoptic examination of larynx reveals smoothness and thickening of arytenoids likely post-radiation as well as a left vocal cord paralysis and right paresis. 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 Patient is not in acute distress but etiology of acute worsening of dysphagia and dysphonia not clear at this moment, and baseline vocal cord function not known. Etiology viral syndrome vs angioedema type reaction vs cancer recurrence (per patient had no issues at last visit with Dr. Collazo a few months ago).    Recommendations  - Continuous pulse ox  - Please send RVP  - Can likely discontinue antibiotics including antifungal  - Continue dexamethasone q8  - Case will be presented to laryngologists on Monday  - May need DL with biopsy  - Diet per SLP - recommending FEES  - Aspiration precautions        
[facility] Division of Hospital Medicine  Danny GuoDO  Available via MS Teams  In house pager [phone]    SUBJECTIVE / OVERNIGHT EVENTS:  No acute events overnight. Seen and examined at bedside this morning. Denies acute complaints.  Wife at bedside - says his voice gets raspier at night time.   Also reports he was diagnosed with sleep apnea but he has been hesitant to use a CPAP machine.    ADDITIONAL REVIEW OF SYSTEMS:    MEDICATIONS  (STANDING):  ampicillin/sulbactam  IVPB      ampicillin/sulbactam  IVPB 1.5 Gram(s) IV Intermittent every 6 hours  budesonide  80 MICROgram(s)/formoterol 4.5 MICROgram(s) Inhaler 2 Puff(s) Inhalation two times a day  dexAMETHasone  IVPB 10 milliGRAM(s) IV Intermittent every 8 hours  fluconAZOLE IVPB 400 milliGRAM(s) IV Intermittent every 24 hours  fluconAZOLE IVPB      heparin  Infusion. 1600 Unit(s)/Hr (16 mL/Hr) IV Continuous <Continuous>    MEDICATIONS  (PRN):  heparin   Injectable 9000 Unit(s) IV Push every 6 hours PRN For aPTT less than 40  heparin   Injectable 4500 Unit(s) IV Push every 6 hours PRN For aPTT between 40 - 57  melatonin 3 milliGRAM(s) Oral at bedtime PRN Insomnia      I&O's Summary      PHYSICAL EXAM:  Vital Signs Last 24 Hrs  T(C): 37.1 (12 Nov 2023 11:57), Max: 37.1 (12 Nov 2023 11:57)  T(F): 98.7 (12 Nov 2023 11:57), Max: 98.7 (12 Nov 2023 11:57)  HR: 76 (12 Nov 2023 11:57) (60 - 86)  BP: 118/67 (12 Nov 2023 11:57) (118/67 - 174/79)  BP(mean): --  RR: 15 (12 Nov 2023 11:57) (14 - 18)  SpO2: 100% (12 Nov 2023 11:57) (99% - 100%)    Parameters below as of 12 Nov 2023 11:57  Patient On (Oxygen Delivery Method): room air      CONSTITUTIONAL: NAD,  well-groomed  EYES: PERRLA; conjunctiva and sclera clear  ENMT: Moist oral mucosa, no pharyngeal injection or exudates; normal dentition; hoarse voice  NECK: Supple, no palpable masses; no thyromegaly  RESPIRATORY: Normal respiratory effort; lungs are clear to auscultation bilaterally  CARDIOVASCULAR: Regular rate and rhythm, normal S1 and S2, no murmur/rub/gallop; No lower extremity edema; Peripheral pulses are 2+ bilaterally  ABDOMEN: Nontender to palpation, normoactive bowel sounds, no rebound/guarding; No hepatosplenomegaly  MUSCULOSKELETAL:  no clubbing or cyanosis of digits; no joint swelling or tenderness to palpation  PSYCH: A+O to person, place, and time; affect appropriate  NEUROLOGY: CN 2-12 are intact and symmetric; no gross sensory deficits   SKIN: No rashes; no palpable lesions    LABS:                        13.3   15.48 )-----------( 304      ( 12 Nov 2023 04:10 )             40.6     11-12    136  |  98  |  26<H>  ----------------------------<  135<H>  4.6   |  27  |  1.18    Ca    9.8      12 Nov 2023 04:10  Phos  2.8     11-12  Mg     2.30     11-12    TPro  6.8  /  Alb  3.7  /  TBili  0.7  /  DBili  x   /  AST  19  /  ALT  13  /  AlkPhos  57  11-11    PT/INR - ( 11 Nov 2023 06:37 )   PT: 12.6 sec;   INR: 1.12 ratio         PTT - ( 12 Nov 2023 13:18 )  PTT:118.6 sec      Urinalysis Basic - ( 12 Nov 2023 04:10 )    Color: x / Appearance: x / SG: x / pH: x  Gluc: 135 mg/dL / Ketone: x  / Bili: x / Urobili: x   Blood: x / Protein: x / Nitrite: x   Leuk Esterase: x / RBC: x / WBC x   Sq Epi: x / Non Sq Epi: x / Bacteria: x    
[facility] Division of Hospital Medicine  Danny Guo DO  Available via MS Teams  In house pager [phone]    SUBJECTIVE / OVERNIGHT EVENTS:  Admitted overnight.  Seen and examined at bedside this morning - wife at bedside providing further history. She was able to pull up records of [facility] and PPM placement on his phone from [facility] which I reviewed at bedside.  She offers collateral regarding his recent hospital visits and overall medical care.  He says he feels well now. Awaiting SLP eval and FEES study.    ADDITIONAL REVIEW OF SYSTEMS:    MEDICATIONS  (STANDING):  ampicillin/sulbactam  IVPB      budesonide  80 MICROgram(s)/formoterol 4.5 MICROgram(s) Inhaler 2 Puff(s) Inhalation two times a day  dexAMETHasone  IVPB 10 milliGRAM(s) IV Intermittent every 8 hours  fluconAZOLE IVPB        MEDICATIONS  (PRN):  melatonin 3 milliGRAM(s) Oral at bedtime PRN Insomnia      I&O's Summary      PHYSICAL EXAM:  Vital Signs Last 24 Hrs  T(C): 36.7 (11 Nov 2023 11:00), Max: 37 (10 Nov 2023 13:45)  T(F): 98.1 (11 Nov 2023 11:00), Max: 98.6 (10 Nov 2023 13:45)  HR: 71 (11 Nov 2023 11:00) (64 - 104)  BP: 145/77 (11 Nov 2023 11:00) (113/80 - 148/85)  BP(mean): --  RR: 16 (11 Nov 2023 11:00) (15 - 18)  SpO2: 99% (11 Nov 2023 11:00) (96% - 99%)    Parameters below as of 11 Nov 2023 11:00  Patient On (Oxygen Delivery Method): room air      CONSTITUTIONAL: NAD, well-groomed  EYES: PERRLA; conjunctiva and sclera clear  ENMT: Moist oral mucosa, no pharyngeal injection or exudates; hoarse voice  NECK: Supple, no palpable masses; no thyromegaly  RESPIRATORY: Normal respiratory effort; lungs are clear to auscultation bilaterally  CARDIOVASCULAR: Regular rate and rhythm, normal S1 and S2, no murmur/rub/gallop; No lower extremity edema; Peripheral pulses are 2+ bilaterally  ABDOMEN: Nontender to palpation, normoactive bowel sounds, no rebound/guarding; No hepatosplenomegaly  MUSCULOSKELETAL:  Nno clubbing or cyanosis of digits; no joint swelling or tenderness to palpation  PSYCH: A+O to person, place, and time; affect appropriate  NEUROLOGY: CN 2-12 are intact and symmetric; no gross sensory deficits   SKIN: No rashes; no palpable lesions    LABS:                        13.1   9.86  )-----------( 291      ( 11 Nov 2023 06:37 )             40.7     11-11    136  |  99  |  24<H>  ----------------------------<  133<H>  4.3   |  22  |  1.26    Ca    9.7      11 Nov 2023 06:37  Phos  3.4     11-11  Mg     2.10     11-11    TPro  6.8  /  Alb  3.7  /  TBili  0.7  /  DBili  x   /  AST  19  /  ALT  13  /  AlkPhos  57  11-11    PT/INR - ( 11 Nov 2023 06:37 )   PT: 12.6 sec;   INR: 1.12 ratio         PTT - ( 11 Nov 2023 06:37 )  PTT:36.8 sec      Urinalysis Basic - ( 11 Nov 2023 06:37 )    Color: x / Appearance: x / SG: x / pH: x  Gluc: 133 mg/dL / Ketone: x  / Bili: x / Urobili: x   Blood: x / Protein: x / Nitrite: x   Leuk Esterase: x / RBC: x / WBC x   Sq Epi: x / Non Sq Epi: x / Bacteria: x  
Martina Jackson MD   Pager [phone], [employer] Teams     INTERVAL HPI/OVERNIGHT EVENTS:  Patient was seen and examined. Complains of hoarseness and feeling hungry, he is otherwise w/o complaints. ROS otherwise negative.     VITAL SIGNS:  T(F): 97.5 (11-13-23 @ 00:36)  HR: 76 (11-13-23 @ 12:05)  BP: 156/73 (11-13-23 @ 12:05)  RR: 18 (11-13-23 @ 12:05)  SpO2: 96% (11-13-23 @ 12:05)  Wt(kg): --    PHYSICAL EXAM:  CONSTITUTIONAL: NAD,  well-groomed  EYES: conjunctiva and sclera clear  ENMT: Moist oral mucosa, no pharyngeal injection or exudates; normal dentition; hoarse voice  NECK: Supple, no palpable masses; no thyromegaly  RESPIRATORY: Normal respiratory effort; lungs are clear to auscultation bilaterally  CARDIOVASCULAR: Regular rate and rhythm, normal S1 and S2, no murmur/rub/gallop; No lower extremity edema; Peripheral pulses are 2+ bilaterally  ABDOMEN: Nontender to palpation, normoactive bowel sounds, no rebound/guarding; No hepatosplenomegaly  MUSCULOSKELETAL:  no clubbing or cyanosis of digits; no joint swelling or tenderness to palpation  PSYCH: A+O to person, place, and time   NEUROLOGY: CN 2-12 are intact and symmetric; no gross sensory deficits   SKIN: No rashes; no palpable lesions      MEDICATIONS  (STANDING):  apixaban 5 milliGRAM(s) Oral every 12 hours  budesonide  80 MICROgram(s)/formoterol 4.5 MICROgram(s) Inhaler 2 Puff(s) Inhalation two times a day  dexAMETHasone  IVPB 10 milliGRAM(s) IV Intermittent every 8 hours  metoprolol tartrate Injectable 5 milliGRAM(s) IV Push every 6 hours    MEDICATIONS  (PRN):  heparin   Injectable 9000 Unit(s) IV Push every 6 hours PRN For aPTT less than 40  heparin   Injectable 4500 Unit(s) IV Push every 6 hours PRN For aPTT between 40 - 57  melatonin 3 milliGRAM(s) Oral at bedtime PRN Insomnia      Allergies    No Known Allergies    Intolerances        LABS:                        13.1   13.48 )-----------( 278      ( 13 Nov 2023 03:36 )             40.5     11-13    139  |  101  |  29<H>  ----------------------------<  128<H>  4.3   |  26  |  1.20    Ca    9.2      13 Nov 2023 03:36  Phos  3.2     11-13  Mg     2.30     11-13      PTT - ( 13 Nov 2023 12:10 )  PTT:26.2 sec  Urinalysis Basic - ( 13 Nov 2023 03:36 )    Color: x / Appearance: x / SG: x / pH: x  Gluc: 128 mg/dL / Ketone: x  / Bili: x / Urobili: x   Blood: x / Protein: x / Nitrite: x   Leuk Esterase: x / RBC: x / WBC x   Sq Epi: x / Non Sq Epi: x / Bacteria: x        RADIOLOGY & ADDITIONAL TESTS:  Reviewed

## 2023-11-13 NOTE — PROGRESS NOTE ADULT - PROBLEM SELECTOR PLAN 1
Vocal cord palsy with overlying white nodular plaque   On imaging with fiberoptic laryngoscopy there are white nodular lesions on a paralyzed true vocal fold on his left vocal cord. Right true vocal fold with minimal adduction. Mild edema was visualized on both arytenoids. With a grossly patent airway. CT scan with aryepiglottic edema.    - Cased discussed w/ ENT advance diet to regular per S&S eval   - discontinue Unasyn and fluconazole per ENT, low suspicion for candida infection   - ENT recommending medrol dose pack on discharge   - F/u ENT--> medically optimized for discharge pending PO intake, anticipate d'c 11/14
Vocal cord palsy with overlying white nodular plaque   On imaging with fiberoptic laryngoscopy there are white nodular lesions on a paralyzed true vocal fold on his left vocal cord. Right true vocal fold with minimal adduction. Mild edema was visualized on both arytenoids. With a grossly patent airway. Patient currently endorses that he cannot tolerate oral liquids. CT scan with aryepiglottic edema.    -   - NPO  - Unasyn + Fluconazole  - Decadron 10mg q8hrs x24 hours   - F/u ENT
Vocal cord palsy with overlying white nodular plaque   On imaging with fiberoptic laryngoscopy there are white nodular lesions on a paralyzed true vocal fold on his left vocal cord. Right true vocal fold with minimal adduction. Mild edema was visualized on both arytenoids. With a grossly patent airway. Patient currently endorses that he cannot tolerate oral liquids. CT scan with aryepiglottic edema.    - continuos pulse ox  - Continue NPO  - Unasyn + Fluconazole  - Decadron 10mg q8hrs x24 hours   - F/u ENT reccs

## 2023-11-13 NOTE — PROGRESS NOTE ADULT - PROBLEM SELECTOR PLAN 7
c/w metoprolol  Can offer IV formulation if NPO, if persistently hypertensive
switching from IV metoprolol to po lopressor now that cleared for diet
c/w metoprolol  Can offer IV formulation given rebound HTN while off po metoprolol

## 2023-11-13 NOTE — SWALLOW VFSS/MBS ASSESSMENT ADULT - RECOMMENDED CONSISTENCY
1. Regular Solids and Thin Liquids  2. Swallowing Guidelines: Seated Upright during Mealtimes; Slow Pacing; Allow for Swallow Prior to Next Presentation; Maintain Oral Hygiene   3. Aspiration and Reflux Precautions

## 2023-11-13 NOTE — PROGRESS NOTE ADULT - TIME BILLING
Time spent on review of vital signs, labs, prior documentation.  Patient interviewed and examined during this encounter. Plan of care was discussed with patient, and ACP. Encounter documented.

## 2023-11-13 NOTE — PROGRESS NOTE ADULT - PROBLEM SELECTOR PLAN 6
resumed aspirin
c/w aspirin 81  can be switched to rectal aspirin if unable to tolerate po
c/w aspirin 81  can be switched to rectal aspirin if unable to tolerate po

## 2023-11-13 NOTE — PROGRESS NOTE ADULT - PROBLEM SELECTOR PLAN 5
Continue with AC - switch to heparin gtt while NPO  Chadsvasc: 4
Continue with AC - switch to lovenox or heparin gtt while NPO  Chadsvasc: 4
Continue with AC - resuming Eliquis 11/3

## 2023-11-13 NOTE — PROGRESS NOTE ADULT - PROBLEM SELECTOR PLAN 9
Hold tamsulosin iso pill intolerance  Restart when able to take PO
resumed tamsulosin
Hold tamsulosin iso pill intolerance  Restart when able to take PO

## 2023-11-13 NOTE — PROGRESS NOTE ADULT - PROBLEM SELECTOR PLAN 3
Patient had drank water without evidence of aspiration however largely unable to tolerate 2.2 pain  Likely all iso vocal cord edema    - FEES study pending, planned 11/13  - S+S consult   - NPO
Patient had drank water without evidence of aspiration however largely unable to tolerate 2.2 pain  Likely all iso vocal cord edema    - FEES study pending, planned 11/13  - S+S consult   - NPO
S&S valerie appreciated, will trial Regular diet likely related to aryepiglottic edema

## 2023-11-13 NOTE — PROGRESS NOTE ADULT - ASSESSMENT
78M w/PMH Afib on eliquis, PPM, HTN, CAD/CABG, MR s/p MVR, asthma, h/o vocal cord ca (dx 2015) c/b prior thrush, colon ca s/p curative resection (2/22) presenting from home with c/o progressive throat pain w/ notable vocal fold edema and overlying white plaque/nodule
78M w/PMH Afib on eliquis, PPM, HTN, CAD/CABG, MR s/p MVR, asthma, h/o vocal cord ca (dx 2015) c/b prior thrush, colon ca s/p curative resection (2/22) presenting from home with c/o progressive throat pain w/ notable vocal fold edema and overlying white plaque/nodule.  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     Dispo: pending tolerating po, passed S&S eval 11/13. 
78M w/PMH Afib on eliquis, PPM, HTN, CAD/CABG, MR s/p MVR, asthma, h/o vocal cord ca (dx 2015) c/b prior thrush, colon ca s/p curative resection (2/22) presenting from home with c/o progressive throat pain w/ notable vocal fold edema and overlying white plaque/nodule

## 2023-11-13 NOTE — SWALLOW VFSS/MBS ASSESSMENT ADULT - DIAGNOSTIC IMPRESSIONS
1) Functional Oral Stage for Puree, Regular Solids, Mildly Thick Liquids, and Thin Liquids characterized by adequate oral containment, adequate bolus manipulation, adequate mastication for Regular Solids, adequate anterior to posterior transfer, premature spillage to the hypopharynx (vallecular/pyriforms) due to reduced tongue to palate seal greater for Thin Liquids, adequate oral clearance.   2) Mild Pharyngeal Stage for Puree, Regular Solids, Mildly Thick Liquids, and Thin Liquids characterized by adequate initiation of the pharyngeal swallow, adequate laryngeal elevation, adequate laryngeal vestibule closure, reduced base of tongue retraction, adequate epiglottic deflection, reduced pharyngeal contractility. There is Trace Pharyngeal Clearance deficits at the posterior base of tongue/vallecular/pyriforms for Thin Liquids post primary swallow. Spontaneous reswallow benefits to improve pharyngeal clearance. There is No Aspiration before, during, or after the swallow for Puree, Regular Solids, Mildly Thick Liquids, and Thin Liquids.

## 2023-11-13 NOTE — PROGRESS NOTE ADULT - PROBLEM SELECTOR PLAN 4
On dulera at home     Dulera has ICS activity which may promote thrush formation  c/w dulera (no pure LABA only options available on formulary)
On dulera at home     Dulera has ICS activity which may promote thrush formation  c/w dulera (no pure LABA only options available on formulary)
On dulera at home     on Symbicort while admitted

## 2023-11-13 NOTE — PROGRESS NOTE ADULT - PROBLEM SELECTOR PLAN 10
Activity: Ambulate as tolerated  Bowel reg:   Consultants: ENT  Diet: can advance to regular diet per S&S and ENT recs  DVT ppx: AC  Dispo: pending tolerating diet  Directive: Full code   Electrolytes: (goal Mg, Phos, K: 2, 3, 4)  Family: Wife
Activity: Ambulate as tolerated  Bowel reg:   Consultants: ENT  Diet: NPO  DVT ppx: AC  Dispo: pending clinical improvement  Directive: Full code - MOLST left with patient to make decision  Electrolytes: (goal Mg, Phos, K: 2, 3, 4)  Family: Wife  Gtts: Judicious IVFs
Activity: Ambulate as tolerated  Bowel reg:   Consultants: ENT  Diet: NPO  DVT ppx: AC  Dispo: pending clinical improvement  Directive: Full code - MOLST left with patient to make decision  Electrolytes: (goal Mg, Phos, K: 2, 3, 4)  Family: Wife  Gtts: Judicious IVFs

## 2023-11-13 NOTE — SWALLOW VFSS/MBS ASSESSMENT ADULT - COMMENTS
Progress Note- Hospitalist 11/12: "78M w/PMH Afib on eliquis, PPM, HTN, CAD/CABG, MR s/p MVR, asthma, h/o vocal cord ca (dx 2015) c/b prior thrush, colon ca s/p curative resection (2/22) presenting from home with c/o progressive throat pain w/ notable vocal fold edema and overlying white plaque/nodule."     ENT 11/12: "78M PMH asthma, afib, CAD, CABGx3, colon cancer, and history of left vocal cord cancer 2015? (per pt and wife) s/p resection and RT (~2015, Dr. Collazo at [facility]) p/w 1-2 weeks of acute on chronic vs progressively worsening dysphagia and dysphonia with inability to tolerate PO. CT scan without discrete mass and some likely post radiation changes to glottis. Fiberoptic examination of larynx reveals smoothness and thickening of arytenoids likely post-radiation as well as a left vocal cord paralysis and right paresis. 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 Patient is not in acute distress but etiology of acute worsening of dysphagia and dysphonia not clear at this moment, and baseline vocal cord function not known. Etiology viral syndrome vs angioedema type reaction vs cancer recurrence (per patient had no issues at last visit with Dr. Collazo a few months ago)."     Of note, patient seen for clinical swallow evaluation on 11/11 with recommendations of NPO. (see report for details)     Of note, Cinesophagram order placed by team.     Patient received in Radiology Suite this AM for a Cinesophagram. Patient transferred to a specialized seating unit with lateral view projection.

## 2023-11-13 NOTE — PROVIDER CONTACT NOTE (OTHER) - SITUATION
patient on heparin drip  ptt resulted 168
patient ptt resulted 184.2
Patient on heparin drip ptt resulted greater than 200

## 2023-11-13 NOTE — PROVIDER CONTACT NOTE (OTHER) - ACTION/TREATMENT ORDERED:
Heparin Turn off for a hour then resume and decrease dose by 400 and decrease rate by 4
resume heparin after a hour

## 2023-11-14 ENCOUNTER — TRANSCRIPTION ENCOUNTER (OUTPATIENT)
Age: 78
End: 2023-11-14

## 2023-11-14 VITALS
TEMPERATURE: 98 F | HEART RATE: 66 BPM | RESPIRATION RATE: 19 BRPM | SYSTOLIC BLOOD PRESSURE: 136 MMHG | OXYGEN SATURATION: 100 % | DIASTOLIC BLOOD PRESSURE: 68 MMHG

## 2023-11-14 LAB
ACANTHOCYTES BLD QL SMEAR: SLIGHT — SIGNIFICANT CHANGE UP
ACANTHOCYTES BLD QL SMEAR: SLIGHT — SIGNIFICANT CHANGE UP
ANION GAP SERPL CALC-SCNC: 13 MMOL/L — SIGNIFICANT CHANGE UP (ref 7–14)
ANION GAP SERPL CALC-SCNC: 13 MMOL/L — SIGNIFICANT CHANGE UP (ref 7–14)
ANISOCYTOSIS BLD QL: SLIGHT — SIGNIFICANT CHANGE UP
ANISOCYTOSIS BLD QL: SLIGHT — SIGNIFICANT CHANGE UP
BASOPHILS # BLD AUTO: 0 K/UL — SIGNIFICANT CHANGE UP (ref 0–0.2)
BASOPHILS # BLD AUTO: 0 K/UL — SIGNIFICANT CHANGE UP (ref 0–0.2)
BASOPHILS NFR BLD AUTO: 0 % — SIGNIFICANT CHANGE UP (ref 0–2)
BASOPHILS NFR BLD AUTO: 0 % — SIGNIFICANT CHANGE UP (ref 0–2)
BUN SERPL-MCNC: 36 MG/DL — HIGH (ref 7–23)
BUN SERPL-MCNC: 36 MG/DL — HIGH (ref 7–23)
CALCIUM SERPL-MCNC: 9.2 MG/DL — SIGNIFICANT CHANGE UP (ref 8.4–10.5)
CALCIUM SERPL-MCNC: 9.2 MG/DL — SIGNIFICANT CHANGE UP (ref 8.4–10.5)
CHLORIDE SERPL-SCNC: 102 MMOL/L — SIGNIFICANT CHANGE UP (ref 98–107)
CHLORIDE SERPL-SCNC: 102 MMOL/L — SIGNIFICANT CHANGE UP (ref 98–107)
CO2 SERPL-SCNC: 25 MMOL/L — SIGNIFICANT CHANGE UP (ref 22–31)
CO2 SERPL-SCNC: 25 MMOL/L — SIGNIFICANT CHANGE UP (ref 22–31)
CREAT SERPL-MCNC: 1.24 MG/DL — SIGNIFICANT CHANGE UP (ref 0.5–1.3)
CREAT SERPL-MCNC: 1.24 MG/DL — SIGNIFICANT CHANGE UP (ref 0.5–1.3)
EGFR: 59 ML/MIN/1.73M2 — LOW
EGFR: 59 ML/MIN/1.73M2 — LOW
EOSINOPHIL # BLD AUTO: 0 K/UL — SIGNIFICANT CHANGE UP (ref 0–0.5)
EOSINOPHIL # BLD AUTO: 0 K/UL — SIGNIFICANT CHANGE UP (ref 0–0.5)
EOSINOPHIL NFR BLD AUTO: 0 % — SIGNIFICANT CHANGE UP (ref 0–6)
EOSINOPHIL NFR BLD AUTO: 0 % — SIGNIFICANT CHANGE UP (ref 0–6)
GLUCOSE SERPL-MCNC: 112 MG/DL — HIGH (ref 70–99)
GLUCOSE SERPL-MCNC: 112 MG/DL — HIGH (ref 70–99)
HCT VFR BLD CALC: 40.3 % — SIGNIFICANT CHANGE UP (ref 39–50)
HCT VFR BLD CALC: 40.3 % — SIGNIFICANT CHANGE UP (ref 39–50)
HCT VFR BLD CALC: 40.4 % — SIGNIFICANT CHANGE UP (ref 39–50)
HCT VFR BLD CALC: 40.4 % — SIGNIFICANT CHANGE UP (ref 39–50)
HGB BLD-MCNC: 13.1 G/DL — SIGNIFICANT CHANGE UP (ref 13–17)
HGB BLD-MCNC: 13.1 G/DL — SIGNIFICANT CHANGE UP (ref 13–17)
HGB BLD-MCNC: 13.2 G/DL — SIGNIFICANT CHANGE UP (ref 13–17)
HGB BLD-MCNC: 13.2 G/DL — SIGNIFICANT CHANGE UP (ref 13–17)
IANC: 9.76 K/UL — HIGH (ref 1.8–7.4)
IANC: 9.76 K/UL — HIGH (ref 1.8–7.4)
LYMPHOCYTES # BLD AUTO: 0.09 K/UL — LOW (ref 1–3.3)
LYMPHOCYTES # BLD AUTO: 0.09 K/UL — LOW (ref 1–3.3)
LYMPHOCYTES # BLD AUTO: 0.9 % — LOW (ref 13–44)
LYMPHOCYTES # BLD AUTO: 0.9 % — LOW (ref 13–44)
MACROCYTES BLD QL: SLIGHT — SIGNIFICANT CHANGE UP
MACROCYTES BLD QL: SLIGHT — SIGNIFICANT CHANGE UP
MAGNESIUM SERPL-MCNC: 2.4 MG/DL — SIGNIFICANT CHANGE UP (ref 1.6–2.6)
MAGNESIUM SERPL-MCNC: 2.4 MG/DL — SIGNIFICANT CHANGE UP (ref 1.6–2.6)
MANUAL SMEAR VERIFICATION: SIGNIFICANT CHANGE UP
MANUAL SMEAR VERIFICATION: SIGNIFICANT CHANGE UP
MCHC RBC-ENTMCNC: 29.3 PG — SIGNIFICANT CHANGE UP (ref 27–34)
MCHC RBC-ENTMCNC: 29.3 PG — SIGNIFICANT CHANGE UP (ref 27–34)
MCHC RBC-ENTMCNC: 30 PG — SIGNIFICANT CHANGE UP (ref 27–34)
MCHC RBC-ENTMCNC: 30 PG — SIGNIFICANT CHANGE UP (ref 27–34)
MCHC RBC-ENTMCNC: 32.4 GM/DL — SIGNIFICANT CHANGE UP (ref 32–36)
MCHC RBC-ENTMCNC: 32.4 GM/DL — SIGNIFICANT CHANGE UP (ref 32–36)
MCHC RBC-ENTMCNC: 32.8 GM/DL — SIGNIFICANT CHANGE UP (ref 32–36)
MCHC RBC-ENTMCNC: 32.8 GM/DL — SIGNIFICANT CHANGE UP (ref 32–36)
MCV RBC AUTO: 90.4 FL — SIGNIFICANT CHANGE UP (ref 80–100)
MCV RBC AUTO: 90.4 FL — SIGNIFICANT CHANGE UP (ref 80–100)
MCV RBC AUTO: 91.6 FL — SIGNIFICANT CHANGE UP (ref 80–100)
MCV RBC AUTO: 91.6 FL — SIGNIFICANT CHANGE UP (ref 80–100)
MONOCYTES # BLD AUTO: 0.37 K/UL — SIGNIFICANT CHANGE UP (ref 0–0.9)
MONOCYTES # BLD AUTO: 0.37 K/UL — SIGNIFICANT CHANGE UP (ref 0–0.9)
MONOCYTES NFR BLD AUTO: 3.5 % — SIGNIFICANT CHANGE UP (ref 2–14)
MONOCYTES NFR BLD AUTO: 3.5 % — SIGNIFICANT CHANGE UP (ref 2–14)
NEUTROPHILS # BLD AUTO: 10.06 K/UL — HIGH (ref 1.8–7.4)
NEUTROPHILS # BLD AUTO: 10.06 K/UL — HIGH (ref 1.8–7.4)
NEUTROPHILS NFR BLD AUTO: 93.9 % — HIGH (ref 43–77)
NEUTROPHILS NFR BLD AUTO: 93.9 % — HIGH (ref 43–77)
NEUTS BAND # BLD: 1.7 % — SIGNIFICANT CHANGE UP (ref 0–6)
NEUTS BAND # BLD: 1.7 % — SIGNIFICANT CHANGE UP (ref 0–6)
NRBC # BLD: 0 /100 WBCS — SIGNIFICANT CHANGE UP (ref 0–0)
NRBC # BLD: 0 /100 WBCS — SIGNIFICANT CHANGE UP (ref 0–0)
NRBC # FLD: 0 K/UL — SIGNIFICANT CHANGE UP (ref 0–0)
NRBC # FLD: 0 K/UL — SIGNIFICANT CHANGE UP (ref 0–0)
OVALOCYTES BLD QL SMEAR: SLIGHT — SIGNIFICANT CHANGE UP
OVALOCYTES BLD QL SMEAR: SLIGHT — SIGNIFICANT CHANGE UP
PHOSPHATE SERPL-MCNC: 2.8 MG/DL — SIGNIFICANT CHANGE UP (ref 2.5–4.5)
PHOSPHATE SERPL-MCNC: 2.8 MG/DL — SIGNIFICANT CHANGE UP (ref 2.5–4.5)
PLAT MORPH BLD: NORMAL — SIGNIFICANT CHANGE UP
PLAT MORPH BLD: NORMAL — SIGNIFICANT CHANGE UP
PLATELET # BLD AUTO: 276 K/UL — SIGNIFICANT CHANGE UP (ref 150–400)
PLATELET # BLD AUTO: 276 K/UL — SIGNIFICANT CHANGE UP (ref 150–400)
PLATELET # BLD AUTO: 291 K/UL — SIGNIFICANT CHANGE UP (ref 150–400)
PLATELET # BLD AUTO: 291 K/UL — SIGNIFICANT CHANGE UP (ref 150–400)
PLATELET COUNT - ESTIMATE: NORMAL — SIGNIFICANT CHANGE UP
PLATELET COUNT - ESTIMATE: NORMAL — SIGNIFICANT CHANGE UP
POIKILOCYTOSIS BLD QL AUTO: SIGNIFICANT CHANGE UP
POIKILOCYTOSIS BLD QL AUTO: SIGNIFICANT CHANGE UP
POLYCHROMASIA BLD QL SMEAR: SLIGHT — SIGNIFICANT CHANGE UP
POLYCHROMASIA BLD QL SMEAR: SLIGHT — SIGNIFICANT CHANGE UP
POTASSIUM SERPL-MCNC: 4 MMOL/L — SIGNIFICANT CHANGE UP (ref 3.5–5.3)
POTASSIUM SERPL-MCNC: 4 MMOL/L — SIGNIFICANT CHANGE UP (ref 3.5–5.3)
POTASSIUM SERPL-SCNC: 4 MMOL/L — SIGNIFICANT CHANGE UP (ref 3.5–5.3)
POTASSIUM SERPL-SCNC: 4 MMOL/L — SIGNIFICANT CHANGE UP (ref 3.5–5.3)
RBC # BLD: 4.4 M/UL — SIGNIFICANT CHANGE UP (ref 4.2–5.8)
RBC # BLD: 4.4 M/UL — SIGNIFICANT CHANGE UP (ref 4.2–5.8)
RBC # BLD: 4.47 M/UL — SIGNIFICANT CHANGE UP (ref 4.2–5.8)
RBC # BLD: 4.47 M/UL — SIGNIFICANT CHANGE UP (ref 4.2–5.8)
RBC # FLD: 15.5 % — HIGH (ref 10.3–14.5)
RBC # FLD: 15.5 % — HIGH (ref 10.3–14.5)
RBC # FLD: 15.7 % — HIGH (ref 10.3–14.5)
RBC # FLD: 15.7 % — HIGH (ref 10.3–14.5)
RBC BLD AUTO: ABNORMAL
RBC BLD AUTO: ABNORMAL
SODIUM SERPL-SCNC: 140 MMOL/L — SIGNIFICANT CHANGE UP (ref 135–145)
SODIUM SERPL-SCNC: 140 MMOL/L — SIGNIFICANT CHANGE UP (ref 135–145)
WBC # BLD: 10.52 K/UL — HIGH (ref 3.8–10.5)
WBC # BLD: 10.52 K/UL — HIGH (ref 3.8–10.5)
WBC # BLD: 10.7 K/UL — HIGH (ref 3.8–10.5)
WBC # BLD: 10.7 K/UL — HIGH (ref 3.8–10.5)
WBC # FLD AUTO: 10.52 K/UL — HIGH (ref 3.8–10.5)
WBC # FLD AUTO: 10.52 K/UL — HIGH (ref 3.8–10.5)
WBC # FLD AUTO: 10.7 K/UL — HIGH (ref 3.8–10.5)
WBC # FLD AUTO: 10.7 K/UL — HIGH (ref 3.8–10.5)

## 2023-11-14 PROCEDURE — 99239 HOSP IP/OBS DSCHRG MGMT >30: CPT

## 2023-11-14 RX ORDER — METOPROLOL TARTRATE 50 MG
1 TABLET ORAL
Refills: 0 | DISCHARGE

## 2023-11-14 RX ORDER — METOPROLOL TARTRATE 50 MG
1 TABLET ORAL
Qty: 0 | Refills: 0 | DISCHARGE
Start: 2023-11-14

## 2023-11-14 RX ADMIN — Medication 81 MILLIGRAM(S): at 11:48

## 2023-11-14 RX ADMIN — APIXABAN 5 MILLIGRAM(S): 2.5 TABLET, FILM COATED ORAL at 05:32

## 2023-11-14 RX ADMIN — Medication 48 MILLIGRAM(S): at 11:49

## 2023-11-14 RX ADMIN — Medication 102 MILLIGRAM(S): at 04:57

## 2023-11-14 RX ADMIN — Medication 50 MILLIGRAM(S): at 05:32

## 2023-11-14 RX ADMIN — Medication 102 MILLIGRAM(S): at 11:48

## 2023-11-14 NOTE — PHYSICAL THERAPY INITIAL EVALUATION ADULT - NSPTDISCHREC_GEN_A_CORE
pt demonstrates independence with functional mobility therefore will D/C pt from PT services/No skilled PT needs

## 2023-11-14 NOTE — DISCHARGE NOTE NURSING/CASE MANAGEMENT/SOCIAL WORK - PATIENT PORTAL LINK FT
You can access the FollowMyHealth Patient Portal offered by [facility] by registering at the following website: [email or web address]. By joining [facility]’s FollowMyHealth portal, you will also be able to view your health information using other applications (apps) compatible with our system.

## 2023-11-14 NOTE — DISCHARGE NOTE PROVIDER - NSDCMRMEDTOKEN_GEN_ALL_CORE_FT
aspirin 81 mg oral tablet: 1 tab(s) orally once a day  Dulera 100 mcg-5 mcg/inh inhalation aerosol: 2 puff(s) inhaled 2 times a day  Eliquis 5 mg oral tablet: 1 tab(s) orally 2 times a day  fenofibrate 48 mg oral tablet: 1 tab(s) orally once a day  Flomax 0.4 mg oral capsule: 1 cap(s) orally once a day  fluticasone 93 mcg/inh nasal spray: 1 spray(s) in each nostril 2 times a day  Lasix 20 mg oral tablet: 1 tab(s) orally once a day as needed for swelling  Lipitor 40 mg oral tablet: 1 tab(s) orally once a day (at bedtime)  metoprolol tartrate 50 mg oral tablet: 1 tab(s) orally 2 times a day   aspirin 81 mg oral tablet: 1 tab(s) orally once a day  Dulera 100 mcg-5 mcg/inh inhalation aerosol: 2 puff(s) inhaled 2 times a day  Eliquis 5 mg oral tablet: 1 tab(s) orally 2 times a day  fenofibrate 48 mg oral tablet: 1 tab(s) orally once a day  Flomax 0.4 mg oral capsule: 1 cap(s) orally once a day  fluticasone 93 mcg/inh nasal spray: 1 spray(s) in each nostril 2 times a day  Lasix 20 mg oral tablet: 1 tab(s) orally once a day as needed for swelling  Lipitor 40 mg oral tablet: 1 tab(s) orally once a day (at bedtime)  Medrol Dosepak 4 mg oral tablet: 1 tab(s) orally once a day FOLLOW INSTRUCTIONS ON BOX  metoprolol tartrate 50 mg oral tablet: 1 tab(s) orally 2 times a day

## 2023-11-14 NOTE — DISCHARGE NOTE PROVIDER - PROVIDER TOKENS
PROVIDER:[TOKEN:[[id number]]] PROVIDER:[TOKEN:[[id number]]],PROVIDER:[TOKEN:[[id number]]]

## 2023-11-14 NOTE — DISCHARGE NOTE PROVIDER - ATTENDING DISCHARGE PHYSICAL EXAMINATION:
CONSTITUTIONAL: NAD,  well-groomed  EYES: conjunctiva and sclera clear  ENMT: Moist oral mucosa, no pharyngeal injection or exudates; normal dentition; + hoarse voice  NECK: Supple, no palpable masses; no thyromegaly  RESPIRATORY: Normal respiratory effort; lungs are clear to auscultation bilaterally  CARDIOVASCULAR: Regular rate and rhythm, normal S1 and S2, no murmur/rub/gallop; No lower extremity edema; Peripheral pulses are 2+ bilaterally  ABDOMEN: Nontender to palpation, normoactive bowel sounds, no rebound/guarding; No hepatosplenomegaly  MUSCULOSKELETAL:  no clubbing or cyanosis of digits; no joint swelling or tenderness to palpation  PSYCH: A+O to person, place, and time   NEUROLOGY: CN 2-12 are intact and symmetric; no gross sensory deficits   SKIN: No rashes; no palpable lesions

## 2023-11-14 NOTE — PHYSICAL THERAPY INITIAL EVALUATION ADULT - PREDICTED DURATION OF THERAPY (DAYS/WKS), PT EVAL
PT evaluation only; pt demonstrates independence with functional mobility therefore will D/C pt from PT services

## 2023-11-14 NOTE — DISCHARGE NOTE NURSING/CASE MANAGEMENT/SOCIAL WORK - NSDCPEFALRISK_GEN_ALL_CORE
For information on Fall & Injury Prevention, visit: [email or web address] OR  [email or web address] OR  https://www.cdc.gov/steadi/patient.html

## 2023-11-14 NOTE — DISCHARGE NOTE PROVIDER - HOSPITAL COURSE
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 CT scan with aryepiglottic edema. Initially treated for possible candida infection w/ fluconazole since discontinued. Also received empiric course of Unasyn for bacterial infection. Patient received course of steroids (dexamethasone) for his vocal fold edema. He will need to take a medrol dose pack on discharge to complete his course as discussed w/ ENT 11/13. Patient seen by S&S, he had a normal cineesophagogram. He was cleared for a regular diet which he tolerated. Patient cleared by ENT for discharge. He will follow up with Dr. Francisco Collazo (his ENT) after discharge. He was instructed also to follow up with his primary care doctor Marvel Cee. He will continue with his dulera for his asthma and follow up with his pulmonologist. For a period of time he was NPO due to his complaints, his oral medications have since been resumed and he is tolerating taking his pills including his blood thinner.     Patient medically optimized for discharge, plan of care was discussed w/ patient wife, patient and ACP

## 2023-11-14 NOTE — DISCHARGE NOTE PROVIDER - NSDCCPCAREPLAN_GEN_ALL_CORE_FT
PRINCIPAL DISCHARGE DIAGNOSIS  Diagnosis: Vocal cord edema  Assessment and Plan of Treatment: You were treated for swelling of the soft tissue around your vocal cords. You received a course of antibiotics and antifungal medication. You also received a course of steroids. No further antibiotics or antifungals are needed on discharge. You will need to complete a course of steroids. You will be sent with a medrol dose pack, please follow the instructions provided by the pharmacy and follow up with your ENT after discharge Dr. Francisco Collazo. Additionally follow up with your primary care doctor Dr. Marvel Cee in 1-2 weeks after discharge.

## 2023-11-14 NOTE — DISCHARGE NOTE PROVIDER - CARE PROVIDER_API CALL
Marvel Cee  Internal Medicine  [address] [place] [id number]  Phone: [phone]  Fax: [phone]  Follow Up Time:    Marvel Cee  Internal Medicine  [address] [place] [address]  Phone: [phone]  Fax: [phone]  Follow Up Time:     Francisco Collazo  Otolaryngology  [address] [place] [address]  Phone: [phone]  Fax: [phone]  Follow Up Time:

## 2023-11-14 NOTE — DISCHARGE NOTE PROVIDER - NSDCDCMDCOMP_GEN_ALL_CORE
Addended by: IRVIN WALKER on: 10/13/2022 02:35 PM     Modules accepted: Orders    
This document is complete and the patient is ready for discharge.

## 2024-06-13 ENCOUNTER — INPATIENT (INPATIENT)
Facility: HOSPITAL | Age: 79
LOS: 3 days | Discharge: HOME | DRG: 291 | End: 2024-06-17
Attending: INTERNAL MEDICINE | Admitting: HOSPITALIST
Payer: MEDICARE

## 2024-06-13 VITALS — WEIGHT: 252.21 LBS

## 2024-06-13 DIAGNOSIS — I50.9 HEART FAILURE, UNSPECIFIED: ICD-10-CM

## 2024-06-13 DIAGNOSIS — Z95.1 PRESENCE OF AORTOCORONARY BYPASS GRAFT: Chronic | ICD-10-CM

## 2024-06-13 LAB
ALBUMIN SERPL ELPH-MCNC: 3 G/DL — LOW (ref 3.3–5)
ALP SERPL-CCNC: 43 U/L — SIGNIFICANT CHANGE UP (ref 40–120)
ALT FLD-CCNC: 16 U/L — SIGNIFICANT CHANGE UP (ref 12–78)
ANION GAP SERPL CALC-SCNC: 4 MMOL/L — LOW (ref 5–17)
AST SERPL-CCNC: 17 U/L — SIGNIFICANT CHANGE UP (ref 15–37)
BASE EXCESS BLDV CALC-SCNC: 6.3 MMOL/L — HIGH (ref -2–3)
BASOPHILS # BLD AUTO: 0.08 K/UL — SIGNIFICANT CHANGE UP (ref 0–0.2)
BASOPHILS NFR BLD AUTO: 1 % — SIGNIFICANT CHANGE UP (ref 0–2)
BILIRUB SERPL-MCNC: 1.2 MG/DL — SIGNIFICANT CHANGE UP (ref 0.2–1.2)
BUN SERPL-MCNC: 22 MG/DL — SIGNIFICANT CHANGE UP (ref 7–23)
CALCIUM SERPL-MCNC: 9.6 MG/DL — SIGNIFICANT CHANGE UP (ref 8.5–10.1)
CHLORIDE SERPL-SCNC: 101 MMOL/L — SIGNIFICANT CHANGE UP (ref 96–108)
CO2 SERPL-SCNC: 30 MMOL/L — SIGNIFICANT CHANGE UP (ref 22–31)
CREAT SERPL-MCNC: 1.44 MG/DL — HIGH (ref 0.5–1.3)
EGFR: 49 ML/MIN/1.73M2 — LOW
EOSINOPHIL # BLD AUTO: 0.08 K/UL — SIGNIFICANT CHANGE UP (ref 0–0.5)
EOSINOPHIL NFR BLD AUTO: 1 % — SIGNIFICANT CHANGE UP (ref 0–6)
FLUAV AG NPH QL: SIGNIFICANT CHANGE UP
FLUBV AG NPH QL: SIGNIFICANT CHANGE UP
GAS PNL BLDV: SIGNIFICANT CHANGE UP
GLUCOSE SERPL-MCNC: 98 MG/DL — SIGNIFICANT CHANGE UP (ref 70–99)
HCO3 BLDV-SCNC: 32 MMOL/L — HIGH (ref 22–29)
HCT VFR BLD CALC: 34 % — LOW (ref 39–50)
HGB BLD-MCNC: 10.8 G/DL — LOW (ref 13–17)
IMM GRANULOCYTES NFR BLD AUTO: 0.4 % — SIGNIFICANT CHANGE UP (ref 0–0.9)
LYMPHOCYTES # BLD AUTO: 0.6 K/UL — LOW (ref 1–3.3)
LYMPHOCYTES # BLD AUTO: 7.6 % — LOW (ref 13–44)
MCHC RBC-ENTMCNC: 29 PG — SIGNIFICANT CHANGE UP (ref 27–34)
MCHC RBC-ENTMCNC: 31.8 GM/DL — LOW (ref 32–36)
MCV RBC AUTO: 91.2 FL — SIGNIFICANT CHANGE UP (ref 80–100)
MONOCYTES # BLD AUTO: 0.96 K/UL — HIGH (ref 0–0.9)
MONOCYTES NFR BLD AUTO: 12.2 % — SIGNIFICANT CHANGE UP (ref 2–14)
NEUTROPHILS # BLD AUTO: 6.12 K/UL — SIGNIFICANT CHANGE UP (ref 1.8–7.4)
NEUTROPHILS NFR BLD AUTO: 77.8 % — HIGH (ref 43–77)
NT-PROBNP SERPL-SCNC: 3300 PG/ML — HIGH (ref 0–450)
PCO2 BLDV: 51 MMHG — SIGNIFICANT CHANGE UP (ref 42–55)
PH BLDV: 7.41 — SIGNIFICANT CHANGE UP (ref 7.32–7.43)
PLATELET # BLD AUTO: 252 K/UL — SIGNIFICANT CHANGE UP (ref 150–400)
PO2 BLDV: 30 MMHG — SIGNIFICANT CHANGE UP (ref 25–45)
POTASSIUM SERPL-MCNC: 3.8 MMOL/L — SIGNIFICANT CHANGE UP (ref 3.5–5.3)
POTASSIUM SERPL-SCNC: 3.8 MMOL/L — SIGNIFICANT CHANGE UP (ref 3.5–5.3)
PROT SERPL-MCNC: 7.2 GM/DL — SIGNIFICANT CHANGE UP (ref 6–8.3)
RBC # BLD: 3.73 M/UL — LOW (ref 4.2–5.8)
RBC # FLD: 15.8 % — HIGH (ref 10.3–14.5)
RSV RNA NPH QL NAA+NON-PROBE: SIGNIFICANT CHANGE UP
SAO2 % BLDV: 49 % — LOW (ref 67–88)
SARS-COV-2 RNA SPEC QL NAA+PROBE: SIGNIFICANT CHANGE UP
SODIUM SERPL-SCNC: 135 MMOL/L — SIGNIFICANT CHANGE UP (ref 135–145)
TROPONIN I, HIGH SENSITIVITY RESULT: 10.13 NG/L — SIGNIFICANT CHANGE UP
WBC # BLD: 7.87 K/UL — SIGNIFICANT CHANGE UP (ref 3.8–10.5)
WBC # FLD AUTO: 7.87 K/UL — SIGNIFICANT CHANGE UP (ref 3.8–10.5)

## 2024-06-13 PROCEDURE — 97530 THERAPEUTIC ACTIVITIES: CPT | Mod: GP

## 2024-06-13 PROCEDURE — 97116 GAIT TRAINING THERAPY: CPT | Mod: GP

## 2024-06-13 PROCEDURE — 80048 BASIC METABOLIC PNL TOTAL CA: CPT

## 2024-06-13 PROCEDURE — 71045 X-RAY EXAM CHEST 1 VIEW: CPT | Mod: 26

## 2024-06-13 PROCEDURE — 71275 CT ANGIOGRAPHY CHEST: CPT | Mod: 26,MC

## 2024-06-13 PROCEDURE — 93306 TTE W/DOPPLER COMPLETE: CPT

## 2024-06-13 PROCEDURE — 93280 PM DEVICE PROGR EVAL DUAL: CPT | Mod: 26

## 2024-06-13 PROCEDURE — 36415 COLL VENOUS BLD VENIPUNCTURE: CPT

## 2024-06-13 PROCEDURE — 83880 ASSAY OF NATRIURETIC PEPTIDE: CPT

## 2024-06-13 PROCEDURE — 99223 1ST HOSP IP/OBS HIGH 75: CPT

## 2024-06-13 PROCEDURE — 99285 EMERGENCY DEPT VISIT HI MDM: CPT

## 2024-06-13 PROCEDURE — 93010 ELECTROCARDIOGRAM REPORT: CPT

## 2024-06-13 PROCEDURE — 71046 X-RAY EXAM CHEST 2 VIEWS: CPT

## 2024-06-13 PROCEDURE — 94640 AIRWAY INHALATION TREATMENT: CPT

## 2024-06-13 PROCEDURE — 94760 N-INVAS EAR/PLS OXIMETRY 1: CPT

## 2024-06-13 PROCEDURE — 97163 PT EVAL HIGH COMPLEX 45 MIN: CPT | Mod: GP

## 2024-06-13 PROCEDURE — 74177 CT ABD & PELVIS W/CONTRAST: CPT | Mod: 26,MC

## 2024-06-13 RX ORDER — ATORVASTATIN CALCIUM 20 MG/1
40 TABLET, FILM COATED ORAL AT BEDTIME
Refills: 0 | Status: DISCONTINUED | OUTPATIENT
Start: 2024-06-13 | End: 2024-06-17

## 2024-06-13 RX ORDER — TAMSULOSIN HYDROCHLORIDE 0.4 MG/1
0.4 CAPSULE ORAL AT BEDTIME
Refills: 0 | Status: DISCONTINUED | OUTPATIENT
Start: 2024-06-13 | End: 2024-06-17

## 2024-06-13 RX ORDER — BUDESONIDE/FORMOTEROL FUMARATE 160-4.5MCG
2 HFA AEROSOL WITH ADAPTER (GRAM) INHALATION
Refills: 0 | Status: DISCONTINUED | OUTPATIENT
Start: 2024-06-13 | End: 2024-06-17

## 2024-06-13 RX ORDER — APIXABAN 5 MG/1
5 TABLET, FILM COATED ORAL EVERY 12 HOURS
Refills: 0 | Status: DISCONTINUED | OUTPATIENT
Start: 2024-06-13 | End: 2024-06-17

## 2024-06-13 RX ORDER — MAGNESIUM, ALUMINUM HYDROXIDE 400-400
30 TABLET,CHEWABLE ORAL EVERY 4 HOURS
Refills: 0 | Status: DISCONTINUED | OUTPATIENT
Start: 2024-06-13 | End: 2024-06-17

## 2024-06-13 RX ORDER — ACETAMINOPHEN 325 MG
650 TABLET ORAL EVERY 6 HOURS
Refills: 0 | Status: DISCONTINUED | OUTPATIENT
Start: 2024-06-13 | End: 2024-06-13

## 2024-06-13 RX ORDER — METOPROLOL TARTRATE 50 MG
50 TABLET ORAL
Refills: 0 | Status: DISCONTINUED | OUTPATIENT
Start: 2024-06-13 | End: 2024-06-17

## 2024-06-13 RX ORDER — AMIODARONE HYDROCHLORIDE 50 MG/ML
100 INJECTION, SOLUTION INTRAVENOUS DAILY
Refills: 0 | Status: DISCONTINUED | OUTPATIENT
Start: 2024-06-14 | End: 2024-06-17

## 2024-06-13 RX ORDER — ONDANSETRON HYDROCHLORIDE 2 MG/ML
4 INJECTION INTRAMUSCULAR; INTRAVENOUS EVERY 8 HOURS
Refills: 0 | Status: DISCONTINUED | OUTPATIENT
Start: 2024-06-13 | End: 2024-06-17

## 2024-06-13 RX ORDER — AMIODARONE HYDROCHLORIDE 50 MG/ML
2 INJECTION, SOLUTION INTRAVENOUS
Refills: 0 | DISCHARGE

## 2024-06-13 RX ORDER — FUROSEMIDE 10 MG/ML
40 INJECTION, SOLUTION INTRAMUSCULAR; INTRAVENOUS ONCE
Refills: 0 | Status: COMPLETED | OUTPATIENT
Start: 2024-06-13 | End: 2024-06-13

## 2024-06-13 RX ORDER — FUROSEMIDE 10 MG/ML
40 INJECTION, SOLUTION INTRAMUSCULAR; INTRAVENOUS
Refills: 0 | Status: DISCONTINUED | OUTPATIENT
Start: 2024-06-13 | End: 2024-06-14

## 2024-06-13 RX ORDER — FENOFIBRATE 130 MG/1
48 CAPSULE ORAL DAILY
Refills: 0 | Status: DISCONTINUED | OUTPATIENT
Start: 2024-06-13 | End: 2024-06-17

## 2024-06-13 RX ORDER — ASPIRIN 325 MG/1
81 TABLET, FILM COATED ORAL DAILY
Refills: 0 | Status: DISCONTINUED | OUTPATIENT
Start: 2024-06-13 | End: 2024-06-17

## 2024-06-13 RX ORDER — ACETAMINOPHEN 325 MG
650 TABLET ORAL EVERY 6 HOURS
Refills: 0 | Status: DISCONTINUED | OUTPATIENT
Start: 2024-06-13 | End: 2024-06-17

## 2024-06-13 RX ADMIN — TAMSULOSIN HYDROCHLORIDE 0.4 MILLIGRAM(S): 0.4 CAPSULE ORAL at 20:12

## 2024-06-13 RX ADMIN — Medication 2 PUFF(S): at 20:04

## 2024-06-13 RX ADMIN — FENOFIBRATE 48 MILLIGRAM(S): 130 CAPSULE ORAL at 20:12

## 2024-06-13 RX ADMIN — ATORVASTATIN CALCIUM 40 MILLIGRAM(S): 20 TABLET, FILM COATED ORAL at 20:12

## 2024-06-13 RX ADMIN — APIXABAN 5 MILLIGRAM(S): 5 TABLET, FILM COATED ORAL at 20:12

## 2024-06-13 RX ADMIN — FUROSEMIDE 40 MILLIGRAM(S): 10 INJECTION, SOLUTION INTRAMUSCULAR; INTRAVENOUS at 14:22

## 2024-06-14 LAB
ANION GAP SERPL CALC-SCNC: 6 MMOL/L — SIGNIFICANT CHANGE UP (ref 5–17)
BUN SERPL-MCNC: 24 MG/DL — HIGH (ref 7–23)
CALCIUM SERPL-MCNC: 9.5 MG/DL — SIGNIFICANT CHANGE UP (ref 8.5–10.1)
CHLORIDE SERPL-SCNC: 99 MMOL/L — SIGNIFICANT CHANGE UP (ref 96–108)
CO2 SERPL-SCNC: 28 MMOL/L — SIGNIFICANT CHANGE UP (ref 22–31)
CREAT SERPL-MCNC: 1.5 MG/DL — HIGH (ref 0.5–1.3)
EGFR: 47 ML/MIN/1.73M2 — LOW
GLUCOSE SERPL-MCNC: 99 MG/DL — SIGNIFICANT CHANGE UP (ref 70–99)
POTASSIUM SERPL-MCNC: 3.5 MMOL/L — SIGNIFICANT CHANGE UP (ref 3.5–5.3)
POTASSIUM SERPL-SCNC: 3.5 MMOL/L — SIGNIFICANT CHANGE UP (ref 3.5–5.3)
SODIUM SERPL-SCNC: 133 MMOL/L — LOW (ref 135–145)

## 2024-06-14 PROCEDURE — 99232 SBSQ HOSP IP/OBS MODERATE 35: CPT

## 2024-06-14 RX ORDER — GUAIFENESIN 100 %
200 POWDER (GRAM) MISCELLANEOUS EVERY 6 HOURS
Refills: 0 | Status: DISCONTINUED | OUTPATIENT
Start: 2024-06-14 | End: 2024-06-17

## 2024-06-14 RX ORDER — FUROSEMIDE 10 MG/ML
40 INJECTION, SOLUTION INTRAMUSCULAR; INTRAVENOUS
Refills: 0 | Status: DISCONTINUED | OUTPATIENT
Start: 2024-06-14 | End: 2024-06-15

## 2024-06-14 RX ADMIN — Medication 650 MILLIGRAM(S): at 13:56

## 2024-06-14 RX ADMIN — TAMSULOSIN HYDROCHLORIDE 0.4 MILLIGRAM(S): 0.4 CAPSULE ORAL at 22:44

## 2024-06-14 RX ADMIN — APIXABAN 5 MILLIGRAM(S): 5 TABLET, FILM COATED ORAL at 10:15

## 2024-06-14 RX ADMIN — Medication 650 MILLIGRAM(S): at 04:06

## 2024-06-14 RX ADMIN — Medication 50 MILLIGRAM(S): at 10:15

## 2024-06-14 RX ADMIN — FUROSEMIDE 40 MILLIGRAM(S): 10 INJECTION, SOLUTION INTRAMUSCULAR; INTRAVENOUS at 22:44

## 2024-06-14 RX ADMIN — ASPIRIN 81 MILLIGRAM(S): 325 TABLET, FILM COATED ORAL at 10:15

## 2024-06-14 RX ADMIN — Medication 2 PUFF(S): at 21:56

## 2024-06-14 RX ADMIN — AMIODARONE HYDROCHLORIDE 100 MILLIGRAM(S): 50 INJECTION, SOLUTION INTRAVENOUS at 10:15

## 2024-06-14 RX ADMIN — ATORVASTATIN CALCIUM 40 MILLIGRAM(S): 20 TABLET, FILM COATED ORAL at 22:44

## 2024-06-14 RX ADMIN — FUROSEMIDE 40 MILLIGRAM(S): 10 INJECTION, SOLUTION INTRAMUSCULAR; INTRAVENOUS at 07:44

## 2024-06-14 RX ADMIN — Medication 200 MILLIGRAM(S): at 23:13

## 2024-06-14 RX ADMIN — FENOFIBRATE 48 MILLIGRAM(S): 130 CAPSULE ORAL at 11:47

## 2024-06-14 RX ADMIN — APIXABAN 5 MILLIGRAM(S): 5 TABLET, FILM COATED ORAL at 22:44

## 2024-06-14 RX ADMIN — Medication 2 PUFF(S): at 08:48

## 2024-06-14 RX ADMIN — Medication 650 MILLIGRAM(S): at 04:44

## 2024-06-15 LAB
ANION GAP SERPL CALC-SCNC: 7 MMOL/L — SIGNIFICANT CHANGE UP (ref 5–17)
BUN SERPL-MCNC: 27 MG/DL — HIGH (ref 7–23)
CALCIUM SERPL-MCNC: 9 MG/DL — SIGNIFICANT CHANGE UP (ref 8.5–10.1)
CHLORIDE SERPL-SCNC: 96 MMOL/L — SIGNIFICANT CHANGE UP (ref 96–108)
CO2 SERPL-SCNC: 30 MMOL/L — SIGNIFICANT CHANGE UP (ref 22–31)
CREAT SERPL-MCNC: 1.62 MG/DL — HIGH (ref 0.5–1.3)
EGFR: 43 ML/MIN/1.73M2 — LOW
GLUCOSE SERPL-MCNC: 100 MG/DL — HIGH (ref 70–99)
POTASSIUM SERPL-MCNC: 3.7 MMOL/L — SIGNIFICANT CHANGE UP (ref 3.5–5.3)
POTASSIUM SERPL-SCNC: 3.7 MMOL/L — SIGNIFICANT CHANGE UP (ref 3.5–5.3)
SODIUM SERPL-SCNC: 133 MMOL/L — LOW (ref 135–145)

## 2024-06-15 PROCEDURE — 71046 X-RAY EXAM CHEST 2 VIEWS: CPT | Mod: 26

## 2024-06-15 PROCEDURE — 99233 SBSQ HOSP IP/OBS HIGH 50: CPT

## 2024-06-15 RX ORDER — FUROSEMIDE 10 MG/ML
20 INJECTION, SOLUTION INTRAMUSCULAR; INTRAVENOUS DAILY
Refills: 0 | Status: DISCONTINUED | OUTPATIENT
Start: 2024-06-16 | End: 2024-06-16

## 2024-06-15 RX ADMIN — Medication 50 MILLIGRAM(S): at 00:04

## 2024-06-15 RX ADMIN — FENOFIBRATE 48 MILLIGRAM(S): 130 CAPSULE ORAL at 14:30

## 2024-06-15 RX ADMIN — AMIODARONE HYDROCHLORIDE 100 MILLIGRAM(S): 50 INJECTION, SOLUTION INTRAVENOUS at 14:30

## 2024-06-15 RX ADMIN — Medication 50 MILLIGRAM(S): at 09:39

## 2024-06-15 RX ADMIN — ASPIRIN 81 MILLIGRAM(S): 325 TABLET, FILM COATED ORAL at 09:39

## 2024-06-15 RX ADMIN — Medication 650 MILLIGRAM(S): at 02:33

## 2024-06-15 RX ADMIN — Medication 2 PUFF(S): at 10:53

## 2024-06-15 RX ADMIN — Medication 200 MILLIGRAM(S): at 06:05

## 2024-06-15 RX ADMIN — Medication 2 PUFF(S): at 21:56

## 2024-06-15 RX ADMIN — FUROSEMIDE 40 MILLIGRAM(S): 10 INJECTION, SOLUTION INTRAMUSCULAR; INTRAVENOUS at 06:04

## 2024-06-15 RX ADMIN — Medication 650 MILLIGRAM(S): at 03:30

## 2024-06-15 RX ADMIN — Medication 650 MILLIGRAM(S): at 14:30

## 2024-06-15 RX ADMIN — APIXABAN 5 MILLIGRAM(S): 5 TABLET, FILM COATED ORAL at 09:39

## 2024-06-16 LAB
ANION GAP SERPL CALC-SCNC: 4 MMOL/L — LOW (ref 5–17)
BUN SERPL-MCNC: 30 MG/DL — HIGH (ref 7–23)
CALCIUM SERPL-MCNC: 9.2 MG/DL — SIGNIFICANT CHANGE UP (ref 8.5–10.1)
CHLORIDE SERPL-SCNC: 95 MMOL/L — LOW (ref 96–108)
CO2 SERPL-SCNC: 32 MMOL/L — HIGH (ref 22–31)
CREAT SERPL-MCNC: 1.46 MG/DL — HIGH (ref 0.5–1.3)
EGFR: 48 ML/MIN/1.73M2 — LOW
GLUCOSE SERPL-MCNC: 109 MG/DL — HIGH (ref 70–99)
NT-PROBNP SERPL-SCNC: 1011 PG/ML — HIGH (ref 0–450)
POTASSIUM SERPL-MCNC: 4 MMOL/L — SIGNIFICANT CHANGE UP (ref 3.5–5.3)
POTASSIUM SERPL-SCNC: 4 MMOL/L — SIGNIFICANT CHANGE UP (ref 3.5–5.3)
SODIUM SERPL-SCNC: 131 MMOL/L — LOW (ref 135–145)

## 2024-06-16 PROCEDURE — 99233 SBSQ HOSP IP/OBS HIGH 50: CPT

## 2024-06-16 RX ORDER — FUROSEMIDE 10 MG/ML
40 INJECTION, SOLUTION INTRAMUSCULAR; INTRAVENOUS
Refills: 0 | Status: DISCONTINUED | OUTPATIENT
Start: 2024-06-16 | End: 2024-06-17

## 2024-06-16 RX ADMIN — Medication 650 MILLIGRAM(S): at 04:01

## 2024-06-16 RX ADMIN — Medication 50 MILLIGRAM(S): at 21:32

## 2024-06-16 RX ADMIN — Medication 3 MILLIGRAM(S): at 21:31

## 2024-06-16 RX ADMIN — Medication 2 PUFF(S): at 20:48

## 2024-06-16 RX ADMIN — FENOFIBRATE 48 MILLIGRAM(S): 130 CAPSULE ORAL at 15:33

## 2024-06-16 RX ADMIN — ASPIRIN 81 MILLIGRAM(S): 325 TABLET, FILM COATED ORAL at 10:13

## 2024-06-16 RX ADMIN — AMIODARONE HYDROCHLORIDE 100 MILLIGRAM(S): 50 INJECTION, SOLUTION INTRAVENOUS at 10:25

## 2024-06-16 RX ADMIN — TAMSULOSIN HYDROCHLORIDE 0.4 MILLIGRAM(S): 0.4 CAPSULE ORAL at 21:31

## 2024-06-16 RX ADMIN — APIXABAN 5 MILLIGRAM(S): 5 TABLET, FILM COATED ORAL at 21:31

## 2024-06-16 RX ADMIN — Medication 200 MILLIGRAM(S): at 06:56

## 2024-06-16 RX ADMIN — APIXABAN 5 MILLIGRAM(S): 5 TABLET, FILM COATED ORAL at 10:12

## 2024-06-16 RX ADMIN — FUROSEMIDE 40 MILLIGRAM(S): 10 INJECTION, SOLUTION INTRAMUSCULAR; INTRAVENOUS at 15:32

## 2024-06-16 RX ADMIN — Medication 50 MILLIGRAM(S): at 10:13

## 2024-06-16 RX ADMIN — Medication 2 PUFF(S): at 10:55

## 2024-06-16 RX ADMIN — ATORVASTATIN CALCIUM 40 MILLIGRAM(S): 20 TABLET, FILM COATED ORAL at 21:31

## 2024-06-17 ENCOUNTER — RESULT REVIEW (OUTPATIENT)
Age: 79
End: 2024-06-17

## 2024-06-17 ENCOUNTER — TRANSCRIPTION ENCOUNTER (OUTPATIENT)
Age: 79
End: 2024-06-17

## 2024-06-17 VITALS
OXYGEN SATURATION: 94 % | SYSTOLIC BLOOD PRESSURE: 105 MMHG | RESPIRATION RATE: 18 BRPM | DIASTOLIC BLOOD PRESSURE: 81 MMHG | HEART RATE: 66 BPM | TEMPERATURE: 98 F

## 2024-06-17 LAB
ANION GAP SERPL CALC-SCNC: 5 MMOL/L — SIGNIFICANT CHANGE UP (ref 5–17)
BUN SERPL-MCNC: 26 MG/DL — HIGH (ref 7–23)
CALCIUM SERPL-MCNC: 9.3 MG/DL — SIGNIFICANT CHANGE UP (ref 8.5–10.1)
CHLORIDE SERPL-SCNC: 93 MMOL/L — LOW (ref 96–108)
CO2 SERPL-SCNC: 32 MMOL/L — HIGH (ref 22–31)
CREAT SERPL-MCNC: 1.41 MG/DL — HIGH (ref 0.5–1.3)
EGFR: 50 ML/MIN/1.73M2 — LOW
GLUCOSE SERPL-MCNC: 105 MG/DL — HIGH (ref 70–99)
NT-PROBNP SERPL-SCNC: 1566 PG/ML — HIGH (ref 0–450)
POTASSIUM SERPL-MCNC: 3.8 MMOL/L — SIGNIFICANT CHANGE UP (ref 3.5–5.3)
POTASSIUM SERPL-SCNC: 3.8 MMOL/L — SIGNIFICANT CHANGE UP (ref 3.5–5.3)
SODIUM SERPL-SCNC: 130 MMOL/L — LOW (ref 135–145)

## 2024-06-17 PROCEDURE — 93306 TTE W/DOPPLER COMPLETE: CPT | Mod: 26

## 2024-06-17 PROCEDURE — 99239 HOSP IP/OBS DSCHRG MGMT >30: CPT

## 2024-06-17 RX ADMIN — ASPIRIN 81 MILLIGRAM(S): 325 TABLET, FILM COATED ORAL at 10:03

## 2024-06-17 RX ADMIN — Medication 650 MILLIGRAM(S): at 01:17

## 2024-06-17 RX ADMIN — FUROSEMIDE 40 MILLIGRAM(S): 10 INJECTION, SOLUTION INTRAMUSCULAR; INTRAVENOUS at 05:55

## 2024-06-17 RX ADMIN — Medication 50 MILLIGRAM(S): at 10:03

## 2024-06-17 RX ADMIN — AMIODARONE HYDROCHLORIDE 100 MILLIGRAM(S): 50 INJECTION, SOLUTION INTRAVENOUS at 10:03

## 2024-06-17 RX ADMIN — APIXABAN 5 MILLIGRAM(S): 5 TABLET, FILM COATED ORAL at 10:03

## 2024-06-17 RX ADMIN — Medication 2 PUFF(S): at 08:18

## 2024-06-17 RX ADMIN — FENOFIBRATE 48 MILLIGRAM(S): 130 CAPSULE ORAL at 11:15

## 2024-06-17 RX ADMIN — Medication 200 MILLIGRAM(S): at 01:17

## 2024-06-24 DIAGNOSIS — E66.9 OBESITY, UNSPECIFIED: ICD-10-CM

## 2024-06-24 DIAGNOSIS — I50.33 ACUTE ON CHRONIC DIASTOLIC (CONGESTIVE) HEART FAILURE: ICD-10-CM

## 2024-06-24 DIAGNOSIS — Z95.3 PRESENCE OF XENOGENIC HEART VALVE: ICD-10-CM

## 2024-06-24 DIAGNOSIS — Z79.01 LONG TERM (CURRENT) USE OF ANTICOAGULANTS: ICD-10-CM

## 2024-06-24 DIAGNOSIS — Z95.0 PRESENCE OF CARDIAC PACEMAKER: ICD-10-CM

## 2024-06-24 DIAGNOSIS — Z95.1 PRESENCE OF AORTOCORONARY BYPASS GRAFT: ICD-10-CM

## 2024-06-24 DIAGNOSIS — J45.909 UNSPECIFIED ASTHMA, UNCOMPLICATED: ICD-10-CM

## 2024-06-24 DIAGNOSIS — J96.01 ACUTE RESPIRATORY FAILURE WITH HYPOXIA: ICD-10-CM

## 2024-06-24 DIAGNOSIS — I48.20 CHRONIC ATRIAL FIBRILLATION, UNSPECIFIED: ICD-10-CM

## 2024-06-24 DIAGNOSIS — I13.0 HYPERTENSIVE HEART AND CHRONIC KIDNEY DISEASE WITH HEART FAILURE AND STAGE 1 THROUGH STAGE 4 CHRONIC KIDNEY DISEASE, OR UNSPECIFIED CHRONIC KIDNEY DISEASE: ICD-10-CM

## 2024-06-24 DIAGNOSIS — R91.1 SOLITARY PULMONARY NODULE: ICD-10-CM

## 2024-06-24 DIAGNOSIS — I49.1 ATRIAL PREMATURE DEPOLARIZATION: ICD-10-CM

## 2024-06-24 DIAGNOSIS — N18.30 CHRONIC KIDNEY DISEASE, STAGE 3 UNSPECIFIED: ICD-10-CM

## 2024-06-24 DIAGNOSIS — I49.5 SICK SINUS SYNDROME: ICD-10-CM

## 2024-06-24 DIAGNOSIS — I25.10 ATHEROSCLEROTIC HEART DISEASE OF NATIVE CORONARY ARTERY WITHOUT ANGINA PECTORIS: ICD-10-CM

## 2024-06-24 DIAGNOSIS — I27.20 PULMONARY HYPERTENSION, UNSPECIFIED: ICD-10-CM

## 2024-06-25 ENCOUNTER — TRANSCRIPTION ENCOUNTER (OUTPATIENT)
Age: 79
End: 2024-06-25

## 2024-07-03 ENCOUNTER — TRANSCRIPTION ENCOUNTER (OUTPATIENT)
Age: 79
End: 2024-07-03

## 2025-01-01 ENCOUNTER — EMERGENCY (EMERGENCY)
Facility: HOSPITAL | Age: 80
LOS: 0 days | End: 2025-01-08
Attending: EMERGENCY MEDICINE
Payer: MEDICARE

## 2025-01-01 DIAGNOSIS — I48.91 UNSPECIFIED ATRIAL FIBRILLATION: ICD-10-CM

## 2025-01-01 DIAGNOSIS — E78.5 HYPERLIPIDEMIA, UNSPECIFIED: ICD-10-CM

## 2025-01-01 DIAGNOSIS — I46.9 CARDIAC ARREST, CAUSE UNSPECIFIED: ICD-10-CM

## 2025-01-01 DIAGNOSIS — Z85.038 PERSONAL HISTORY OF OTHER MALIGNANT NEOPLASM OF LARGE INTESTINE: ICD-10-CM

## 2025-01-01 DIAGNOSIS — Z85.21 PERSONAL HISTORY OF MALIGNANT NEOPLASM OF LARYNX: ICD-10-CM

## 2025-01-01 DIAGNOSIS — Z95.1 PRESENCE OF AORTOCORONARY BYPASS GRAFT: Chronic | ICD-10-CM

## 2025-01-01 DIAGNOSIS — Z95.0 PRESENCE OF CARDIAC PACEMAKER: ICD-10-CM

## 2025-01-01 DIAGNOSIS — C14.0 MALIGNANT NEOPLASM OF PHARYNX, UNSPECIFIED: ICD-10-CM

## 2025-01-01 DIAGNOSIS — I25.10 ATHEROSCLEROTIC HEART DISEASE OF NATIVE CORONARY ARTERY WITHOUT ANGINA PECTORIS: ICD-10-CM

## 2025-01-01 PROCEDURE — 99285 EMERGENCY DEPT VISIT HI MDM: CPT | Mod: 25

## 2025-01-01 PROCEDURE — 82962 GLUCOSE BLOOD TEST: CPT

## 2025-01-01 PROCEDURE — 92950 HEART/LUNG RESUSCITATION CPR: CPT

## 2025-01-01 PROCEDURE — 99282 EMERGENCY DEPT VISIT SF MDM: CPT

## 2025-01-08 NOTE — ED ADULT TRIAGE NOTE - CHIEF COMPLAINT QUOTE
Pt BIBEMS from home with caridac arrest. Upon arrival, cpr in progress with Shawn machine for approximately 42 minutes. I jell placed by EMS, 18G IV placed by EMS. 100ml NS,  7 of epi given and 1 amp of bicarb and 450of amnio given by EMS PTA.  MD Norman, RN, respiratory at bedside. Pt brought directly to trauma room. Unable to obtain further info.

## 2025-01-08 NOTE — ED PROVIDER NOTE - PHYSICAL EXAMINATION
General: Unresponsive; HEAD: nontraumatic  HEENT: nose without epistaxis or swelling; intubated   CV: no pulse, no heart sounds;   RESP: breath sounds bilaterally with assisted bagging only  ABD: obese, mildly distended  SKIN: Pale; cyanotic fingers  : normal male genitalia, no swelling  Extremities; nontraumatic; no gross abnormality  LYMPH: no peripheral edema

## 2025-01-08 NOTE — ED PROVIDER NOTE - OBJECTIVE STATEMENT
Pt. is a 80 yo M with hx of throat cancer, hx of colon cancer, hx of hyperlipidemia, PPM. valvular heart disease, CAD, Afib presents via ambulance from home after found unresponsive by wife.  Per wife, patient was complaining of feeling short of breath all day.  Per EMS, patient found in PEA arrest, CPR initiated, IVs place, airway placed- iGel inserted per paramedic, airway with swelling of epiglottis and soft tissue swelling around where cords should be.  ETCO2 reported as 30-35 with condensation and breath sounds while bagging.  Patient in Vfib X 3 episodes and shock given by EMS.  Patient otherwise in asystole or PEA.  Patient given 7 epi and 1 amp sodium bicarbonate given via IV prior to arrival.  Paitient arrived with thumper and CPR in progress.  Per EMS, upon arrival, patient had been undergoing CPR for 48 minutes. Pt. is a 78 yo M with hx of throat cancer, hx of colon cancer, hx of hyperlipidemia, PPM. valvular heart disease, CAD, Afib presents via ambulance from home after found unresponsive by wife.  Per wife, patient was complaining of feeling short of breath all day.  Per EMS, patient found in PEA arrest, CPR initiated, IVs place, airway placed- iGel inserted per paramedic, airway with swelling of epiglottis and soft tissue swelling around where cords should be.  ETCO2 reported as 30-35 with condensation and breath sounds while bagging.  Patient in Vfib X 3 episodes and shock given by EMS- amiodarone bolus also given.  Patient otherwise in asystole or PEA.  Patient given 7 epi and 1 amp sodium bicarbonate given via IV prior to arrival.  Paitient arrived with thumper and CPR in progress.  Per EMS, upon arrival, patient had been undergoing CPR for 48 minutes.

## 2025-01-08 NOTE — ED ADULT NURSE NOTE - NSSUHOSCREENINGYN_ED_ALL_ED
CHW - Case Closure    This Community Health Worker spoke to PATRICIA Figueroa over the phone today.   Pt/Caregiver reported: Patient declined any additional need for resources at this time. Patient is still waiting to hear from [place] [employer] regarding Meals on Wheels and was informed of the general wait time for assistance. No additional needs reported at this time   Pt/Caregiver denied any additional needs at this time and agrees with episode closure at this time.  Provided PATRICIA Figueroa with Community Health Worker's contact information and encouraged him/her to contact this Community Health Worker if additional needs arise.        
No - the patient is unable to be screened due to medical condition

## 2025-01-08 NOTE — ED PROVIDER NOTE - CLINICAL SUMMARY MEDICAL DECISION MAKING FREE TEXT BOX
80 yo M with multiple cardiac comorbidities, PPM, in cardiac arrest X 48 minutes before arrival: Possible airway obstruction vs pulmonary edema vs pulmonary embolism vs cardiac arrythmia from MI. 80 yo M with multiple cardiac comorbidities, PPM, in cardiac arrest X 48 minutes before arrival: Possible airway obstruction vs pulmonary edema vs pulmonary embolism vs cardiac arrythmia from MI.    1:54 AM  Wife and son now at bedside.  Wife states she was checking his BP this evening.  His BP was 160s/90s.  He was complaining of wheezing and feeling tired and short of breath.  He was supposed to get a CT of his chest tomorrow.  Per wife his cancer involved his vocal cords.

## 2025-01-08 NOTE — ED ADULT NURSE NOTE - OBJECTIVE STATEMENT
Pt presents to ed from home in cardiac arrest. EMS states they were performing CPR for 48min prior to arrival. EMS states they were unable to intubate pt due to pt hx of throat cancer, and swellig. EMS established a IGEL size 4. EMS established a 18 robert IV in the left AC. gave x7 epi, x1 Bicarb, and shocked x3 times. EMS states pt BMG at home was 137. Upon arrival to ed pt . Pt presents to ed from home in cardiac arrest. EMS states they were performing CPR for 48min prior to arrival. EMS states they were unable to intubate pt due to pt hx of throat cancer, and swelling. EMS established a advanced airway IGEL size 4. EMS established a 18 robert IV in the left AC. gave x7 epi, x1 Bicarb,450 amiodarone and shocked x3 times. EMS states pt BMG at home was 137. Upon arrival to ed pt . Pt arrived on a MICHELLE machine unresponsive placed on zole pads, MD Norman attempted to intubated with no success respiratory maintained IGEL, pt in asystole, CPR resumed total of x3 epi given, pt remain in asystole, MD Norman called time of death at 1:31 Family in waiting room. Pt presents to ed from home in cardiac arrest. EMS states they were performing CPR for 48min prior to arrival. EMS states they were unable to intubate pt due to pt hx of throat cancer, and swelling. EMS established a advanced airway IGEL size 4. EMS established a 18 robert IV in the left and right AC. gave x7 epi, x1 Bicarb,450 amiodarone and shocked x3 times. EMS states pt BMG at home was 137. Upon arrival to ed pt . Pt arrived on a MICHELLE machine unresponsive placed on zole pads, MD Norman attempted to intubated with no success respiratory maintained IGEL, pt in asystole, CPR resumed total of x3 epi given, pt remain in asystole, MD Norman called time of death at 1:31 Family in waiting room. Pt presents to ed from home in cardiac arrest. EMS states when they arrived to pt home wife started CPR. EMS state  they performed CPR for 48min prior to arrival. EMS states they were unable to intubate pt due to pt hx of throat cancer, and swelling. EMS established a advanced airway IGEL size 4. EMS established a 18 robert IV in the left and right AC. gave x7 epi, x1 Bicarb,450 amiodarone and shocked x3 times. EMS states pt BMG at home was 137. Upon arrival to ed pt . Pt arrived on a MICHELLE machine unresponsive placed on zole pads, MD Norman attempted to intubated with no success respiratory maintained IGEL, pt in asystole, CPR resumed total of x3 epi given, pt remain in asystole, MD Norman called time of death at 1:31 Family in waiting room.

## 2025-04-16 NOTE — PATIENT PROFILE ADULT - BILL PAYMENT
Daughter answered, called to followed up on 's departure , told her I see [place] and I will get her reschduled with him and she will see in [facility]  
no